# Patient Record
Sex: FEMALE | Race: WHITE | HISPANIC OR LATINO | Employment: FULL TIME | URBAN - METROPOLITAN AREA
[De-identification: names, ages, dates, MRNs, and addresses within clinical notes are randomized per-mention and may not be internally consistent; named-entity substitution may affect disease eponyms.]

---

## 2017-04-10 ENCOUNTER — OFFICE VISIT (OUTPATIENT)
Dept: URGENT CARE | Facility: URGENT CARE | Age: 47
End: 2017-04-10
Payer: COMMERCIAL

## 2017-04-10 VITALS
WEIGHT: 258.8 LBS | BODY MASS INDEX: 47.72 KG/M2 | OXYGEN SATURATION: 97 % | TEMPERATURE: 97.4 F | HEART RATE: 74 BPM | DIASTOLIC BLOOD PRESSURE: 78 MMHG | SYSTOLIC BLOOD PRESSURE: 108 MMHG

## 2017-04-10 DIAGNOSIS — J02.9 ACUTE PHARYNGITIS, UNSPECIFIED: ICD-10-CM

## 2017-04-10 DIAGNOSIS — J02.0 STREP THROAT: Primary | ICD-10-CM

## 2017-04-10 LAB
DEPRECATED S PYO AG THROAT QL EIA: ABNORMAL
MICRO REPORT STATUS: ABNORMAL
SPECIMEN SOURCE: ABNORMAL

## 2017-04-10 PROCEDURE — 99213 OFFICE O/P EST LOW 20 MIN: CPT | Performed by: PHYSICIAN ASSISTANT

## 2017-04-10 PROCEDURE — 87880 STREP A ASSAY W/OPTIC: CPT | Performed by: PHYSICIAN ASSISTANT

## 2017-04-10 RX ORDER — AMOXICILLIN 875 MG
875 TABLET ORAL 2 TIMES DAILY
Qty: 20 TABLET | Refills: 0 | Status: SHIPPED | OUTPATIENT
Start: 2017-04-10 | End: 2017-04-26

## 2017-04-10 NOTE — PROGRESS NOTES
SUBJECTIVE:  Pam Gaviria is a 46 year old female with a chief complaint of sore throat and ear pain.  Onset of symptoms was 1 week(s) ago.    Course of illness: gradual onset and still present.  Severity moderate  Current and Associated symptoms: none  Treatment measures tried include Fluids, OTC meds and Rest.  Predisposing factors include None.    No past medical history on file.  Current Outpatient Prescriptions   Medication Sig Dispense Refill     etonogestrel-ethinyl estradiol (NUVARING) 0.12-0.015 MG/24HR vaginal ring Place 1 ring every 21 days then remove for 1 week. 3 each 0     fluticasone (FLOVENT HFA) 110 MCG/ACT inhaler Inhale 2 puffs into the lungs 2 times daily Need appointment with Dr. Alonso before next refill can be given. 1 Inhaler 0     cetirizine-psuedoePHEDrine (ZYRTEC-D) 5-120 MG per tablet Take 1 tablet by mouth 2 times daily 180 tablet 0     albuterol (PROAIR HFA, PROVENTIL HFA, VENTOLIN HFA) 108 (90 BASE) MCG/ACT inhaler Inhale 2 puffs into the lungs every 6 hours       azelastine (ASTELIN) 137 MCG/SPRAY nasal spray [place] 1 spray into both nostrils 2 times daily       fluticasone (FLONASE) 50 MCG/ACT nasal spray Spray 2 sprays into both nostrils daily       Tetrahydrozoline-Zn Sulfate (EYE DROPS ALLERGY RELIEF OP)        amoxicillin (AMOXIL) 875 MG tablet Take 1 tablet (875 mg) by mouth 2 times daily (Patient not taking: Reported on 4/10/2017) 20 tablet 0     albuterol (PROAIR HFA, PROVENTIL HFA, VENTOLIN HFA) 108 (90 BASE) MCG/ACT inhaler Inhale 2 puffs into the lungs every 6 hours as needed for shortness of breath / dyspnea or wheezing (Patient not taking: Reported on 4/10/2017) 1 Inhaler 1     Social History   Substance Use Topics     Smoking status: Never Smoker     Smokeless tobacco: Never Used     Alcohol use Yes       ROS:  Review of systems negative except as stated above.    OBJECTIVE:   /78 (BP Location: Right arm, Patient Position: Chair, Cuff Size: Adult Large)  Pulse 74   Temp 97.4  F (36.3  C) (Tympanic)  Wt 258 lb 12.8 oz (117.4 kg)  SpO2 97%  BMI 47.72 kg/m2  GENERAL APPEARANCE: healthy, alert and no distress  EYES: EOMI,  PERRL, conjunctiva clear  HENT: ear canals and TM's normal.  Nose normal.  Pharynx erythematous without exudate noted.  NECK: bilateral anterior cervical adenopathy  RESP: lungs clear to auscultation - no rales, rhonchi or wheezes  CV: regular rates and rhythm, normal S1 S2, no murmur noted  ABDOMEN:  soft, nontender, no HSM or masses and bowel sounds normal  SKIN: no suspicious lesions or rashes    Rapid Strep test is positive    ASSESSMENT:  Strep[  Throat    PLAN:   .Amoxicillin as directed  Symptomatic treat with gargles, lozenges, and OTC analgesic as needed. Follow-up with primary clinic if not improving.  Advisement given that patient will be contagious for the next 24-48 hours after antibiotics initiated

## 2017-04-10 NOTE — NURSING NOTE
"Chief Complaint   Patient presents with     Urgent Care     Pharyngitis     Pt states has had swolllen tonils and ear pain x 1 week.        Initial /78 (BP Location: Right arm, Patient Position: Chair, Cuff Size: Adult Large)  Pulse 74  Temp 97.4  F (36.3  C) (Tympanic)  Wt 258 lb 12.8 oz (117.4 kg)  SpO2 97%  BMI 47.72 kg/m2 Estimated body mass index is 47.72 kg/(m^2) as calculated from the following:    Height as of 11/11/15: 5' 1.75\" (1.568 m).    Weight as of this encounter: 258 lb 12.8 oz (117.4 kg).  Medication Reconciliation: unable or not appropriate to perform   Edy Leary CMA ([facility]) 4/10/2017 12:05 PM    "

## 2017-04-10 NOTE — MR AVS SNAPSHOT
After Visit Summary   4/10/2017    Pam Gaviria    MRN: [id number]           Patient Information     Date Of Birth          1970        Visit Information        Provider Department      4/10/2017 11:25 AM Margaret Danielson PA-C [facility]        Today's Diagnoses     Strep throat    -  1    Acute pharyngitis, unspecified           Follow-ups after your visit        Who to contact     If you have questions or need follow up information about today's clinic visit or your schedule please contact [facility] directly at [phone].  Normal or non-critical lab and imaging results will be communicated to you by [employer], letter or phone within 4 business days after the clinic has received the results. If you do not hear from us within 7 days, please contact the clinic through [employer] or phone. If you have a critical or abnormal lab result, we will notify you by phone as soon as possible.  Submit refill requests through [employer] or call your pharmacy and they will forward the refill request to us. Please allow 3 business days for your refill to be completed.          Additional Information About Your Visit        MyChart Information     [employer] gives you secure access to your electronic health record. If you see a primary care provider, you can also send messages to your care team and make appointments. If you have questions, please call your primary care clinic.  If you do not have a primary care provider, please call [phone] and they will assist you.        Care EveryWhere ID     This is your Care EveryWhere ID. This could be used by other organizations to access your [facility] medical records  [id number]        Your Vitals Were     Pulse Temperature Pulse Oximetry BMI (Body Mass Index)          74 97.4  F (36.3  C) (Tympanic) 97% 47.72 kg/m2         Blood Pressure from Last 3 Encounters:   04/10/17 108/78   12/22/16 104/78   11/11/15 106/72    Weight from  Last 3 Encounters:   04/10/17 258 lb 12.8 oz (117.4 kg)   12/22/16 230 lb (104.3 kg)   11/11/15 245 lb 11.2 oz (111.4 kg)              We Performed the Following     Strep, Rapid Screen          Today's Medication Changes          These changes are accurate as of: 4/10/17  2:07 PM.  If you have any questions, ask your nurse or doctor.               These medicines have changed or have updated prescriptions.        Dose/Directions    * amoxicillin 875 MG tablet   Commonly known as:  AMOXIL   This may have changed:  Another medication with the same name was added. Make sure you understand how and when to take each.   Used for:  Strep throat   Changed by:  Carson Acuña PA-C        Dose:  875 mg   Take 1 tablet (875 mg) by mouth 2 times daily   Quantity:  20 tablet   Refills:  0       * amoxicillin 875 MG tablet   Commonly known as:  AMOXIL   This may have changed:  You were already taking a medication with the same name, and this prescription was added. Make sure you understand how and when to take each.   Used for:  Strep throat   Changed by:  Margaret Danielson PA-C        Dose:  875 mg   Take 1 tablet (875 mg) by mouth 2 times daily   Quantity:  20 tablet   Refills:  0       * Notice:  This list has 2 medication(s) that are the same as other medications prescribed for you. Read the directions carefully, and ask your doctor or other care provider to review them with you.         Where to get your medicines      These medications were sent to [id number] - [place] - [address]  AT [facility]  [address] [place] [address]     Phone:  [phone]     amoxicillin 875 MG tablet                Primary Care Provider Office Phone # Fax #    Mandeep Alonso [phone]       [facility] [address] [place] [address] [address] [place] [id number]        Thank you!     Thank you for choosing [facility]  for your care. Our  goal is always to provide you with excellent care. Hearing back from our patients is one way we can continue to improve our services. Please take a few minutes to complete the written survey that you may receive in the mail after your visit with us. Thank you!             Your Updated Medication List - Protect others around you: Learn how to safely use, store and throw away your medicines at www.disposemymeds.org.          This list is accurate as of: 4/10/17  2:07 PM.  Always use your most recent med list.                   Brand Name Dispense Instructions for use    * albuterol 108 (90 BASE) MCG/ACT Inhaler    PROAIR HFA/PROVENTIL HFA/VENTOLIN HFA     Inhale 2 puffs into the lungs every 6 hours       * albuterol 108 (90 BASE) MCG/ACT Inhaler    PROAIR HFA/PROVENTIL HFA/VENTOLIN HFA    1 Inhaler    Inhale 2 puffs into the lungs every 6 hours as needed for shortness of breath / dyspnea or wheezing       * amoxicillin 875 MG tablet    AMOXIL    20 tablet    Take 1 tablet (875 mg) by mouth 2 times daily       * amoxicillin 875 MG tablet    AMOXIL    20 tablet    Take 1 tablet (875 mg) by mouth 2 times daily       azelastine 0.1 % spray    ASTELIN     Spray 1 spray into both nostrils 2 times daily       cetirizine-psuedoePHEDrine 5-120 MG per 12 hr tablet    zyrTEC-D    180 tablet    Take 1 tablet by mouth 2 times daily       etonogestrel-ethinyl estradiol 0.12-0.015 MG/24HR vaginal ring    NUVARING    3 each    Place 1 ring every 21 days then remove for 1 week.       EYE DROPS ALLERGY RELIEF OP          fluticasone 110 MCG/ACT Inhaler    FLOVENT HFA    1 Inhaler    Inhale 2 puffs into the lungs 2 times daily Need appointment with Dr. Alonso before next refill can be given.       fluticasone 50 MCG/ACT spray    FLONASE     Spray 2 sprays into both nostrils daily       * Notice:  This list has 4 medication(s) that are the same as other medications prescribed for you. Read the directions carefully, and ask your doctor or  other care provider to review them with you.

## 2017-04-26 ENCOUNTER — OFFICE VISIT (OUTPATIENT)
Dept: URGENT CARE | Facility: URGENT CARE | Age: 47
End: 2017-04-26
Payer: COMMERCIAL

## 2017-04-26 VITALS
HEART RATE: 82 BPM | BODY MASS INDEX: 47.76 KG/M2 | TEMPERATURE: 99 F | OXYGEN SATURATION: 100 % | WEIGHT: 259 LBS | DIASTOLIC BLOOD PRESSURE: 76 MMHG | SYSTOLIC BLOOD PRESSURE: 110 MMHG

## 2017-04-26 DIAGNOSIS — R05.9 COUGH: ICD-10-CM

## 2017-04-26 DIAGNOSIS — J02.9 SORE THROAT: Primary | ICD-10-CM

## 2017-04-26 DIAGNOSIS — J31.0 CHRONIC RHINITIS: ICD-10-CM

## 2017-04-26 LAB
DEPRECATED S PYO AG THROAT QL EIA: NORMAL
MICRO REPORT STATUS: NORMAL
SPECIMEN SOURCE: NORMAL

## 2017-04-26 PROCEDURE — 87880 STREP A ASSAY W/OPTIC: CPT | Performed by: FAMILY MEDICINE

## 2017-04-26 PROCEDURE — 99214 OFFICE O/P EST MOD 30 MIN: CPT | Performed by: FAMILY MEDICINE

## 2017-04-26 PROCEDURE — 87081 CULTURE SCREEN ONLY: CPT | Performed by: FAMILY MEDICINE

## 2017-04-26 RX ORDER — CODEINE PHOSPHATE AND GUAIFENESIN 10; 100 MG/5ML; MG/5ML
2 SOLUTION ORAL EVERY 4 HOURS PRN
Qty: 120 ML | Refills: 0 | Status: SHIPPED | OUTPATIENT
Start: 2017-04-26 | End: 2017-05-12

## 2017-04-26 RX ORDER — MONTELUKAST SODIUM 10 MG/1
10 TABLET ORAL AT BEDTIME
Qty: 30 TABLET | Refills: 0 | Status: SHIPPED | OUTPATIENT
Start: 2017-04-26 | End: 2017-05-11

## 2017-04-26 NOTE — PROGRESS NOTES
SUBJECTIVE:   Pam Gaviria is a 46 year old female presenting with a chief complaint of sore throat.  Onset of symptoms was 2 day(s) ago.  Course of illness is worsening, persistent.    Severity moderate  Current and Associated symptoms: congestion, sinus headache, itchy ears, coughing  Treatment measures tried include Fluids, OTC meds and Rest.  Predisposing factors include HX of chronic sinusitis, HX of asthma and seasonal allergies.    Patient states that has struggled with chronic allergies all her life, is taking zyrtec and uses her inhalers.  Patient developed more sore throat.  Denies any fever.  Will be leaving for [place] this weekend.    No past medical history on file.  Current Outpatient Prescriptions   Medication Sig Dispense Refill     fluticasone furoate (ARNUITY ELLIPTA) 100 MCG/ACT AEPB inhalation powder Inhale 1 puff into the lungs daily       etonogestrel-ethinyl estradiol (NUVARING) 0.12-0.015 MG/24HR vaginal ring Place 1 ring every 21 days then remove for 1 week. 3 each 0     fluticasone (FLOVENT HFA) 110 MCG/ACT inhaler Inhale 2 puffs into the lungs 2 times daily Need appointment with Dr. Alonso before next refill can be given. 1 Inhaler 0     albuterol (PROAIR HFA, PROVENTIL HFA, VENTOLIN HFA) 108 (90 BASE) MCG/ACT inhaler Inhale 2 puffs into the lungs every 6 hours as needed for shortness of breath / dyspnea or wheezing 1 Inhaler 1     cetirizine-psuedoePHEDrine (ZYRTEC-D) 5-120 MG per tablet Take 1 tablet by mouth 2 times daily 180 tablet 0     azelastine (ASTELIN) 137 MCG/SPRAY nasal spray [place] 1 spray into both nostrils 2 times daily       fluticasone (FLONASE) 50 MCG/ACT nasal spray Spray 2 sprays into both nostrils daily       Tetrahydrozoline-Zn Sulfate (EYE DROPS ALLERGY RELIEF OP)        [DISCONTINUED] albuterol (PROAIR HFA, PROVENTIL HFA, VENTOLIN HFA) 108 (90 BASE) MCG/ACT inhaler Inhale 2 puffs into the lungs every 6 hours       Social History   Substance Use Topics      Smoking status: Never Smoker     Smokeless tobacco: Never Used     Alcohol use Yes       ROS:  CONSTITUTIONAL:NEGATIVE for fever, chills, change in weight and POSITIVE  for fatigue  ENT/MOUTH: POSITIVE for earache bilateral, Hx sinus infections, nasal congestion, sinus pressure, sneezing and sore throat  RESP:POSITIVE for cough-non productive, cough-productive and Hx asthma  CV: NEGATIVE for chest pain, palpitations or peripheral edema  GI: NEGATIVE for nausea, abdominal pain, heartburn, or change in bowel habits  MUSCULOSKELETAL: NEGATIVE for significant arthralgias or myalgia    OBJECTIVE  :/76  Pulse 82  Temp 99  F (37.2  C) (Oral)  Wt 259 lb (117.5 kg)  SpO2 100%  BMI 47.76 kg/m2  GENERAL APPEARANCE: healthy, alert and no distress  EYES: EOMI,  PERRL, conjunctiva clear  HENT: ear canals and TM's normal.  Nose and mouth without ulcers, erythema or lesions  NECK: supple, nontender, no lymphadenopathy  RESP: lungs clear to auscultation - no rales, rhonchi or wheezes  CV: regular rates and rhythm, normal S1 S2, no murmur noted  NEURO: Normal strength and tone, sensory exam grossly normal,  normal speech and mentation  SKIN: no suspicious lesions or rashes  PSYCH: mentation appears normal and affect normal/bright    Results for orders placed or performed in visit on 04/26/17   Strep, Rapid Screen   Result Value Ref Range    Specimen Description Throat     Rapid Strep A Screen       NEGATIVE: No Group A streptococcal antigen detected by immunoassay, await   culture report.      Micro Report Status FINAL 04/26/2017          ASSESSMENT/PLAN:  (J02.9) Sore throat  (primary encounter diagnosis)  Comment: viral  Plan: Strep, Rapid Screen, Beta strep group A culture            (R05) Cough  Comment: viral  Plan: fluticasone furoate (ARNUITY ELLIPTA) 100         MCG/ACT AEPB inhalation powder,         guaiFENesin-codeine (ROBITUSSIN AC) 100-10         MG/5ML SOLN solution            (J31.0) Chronic  rhinitis  Comment: allergies  Plan: fluticasone furoate (ARNUITY ELLIPTA) 100         MCG/ACT AEPB inhalation powder, montelukast         (SINGULAIR) 10 MG tablet            Reassurance given, reviewed symptomatic treatment, plenty of fluids and rest.  Will follow up on throat culture and treat if positive for strep infection.  Encourage to continue with inhalers.  RX adam AC given to help with cough, best at bedtime.  Trial of singulair given to use due to asthma and allergies.    Follow up with primary provider if no resolution of symptoms.    Pawan Gutierrez MD  April 26, 2017 5:27 PM

## 2017-04-26 NOTE — MR AVS SNAPSHOT
After Visit Summary   4/26/2017    Pam Gaviria    MRN: [id number]           Patient Information     Date Of Birth          1970        Visit Information        Provider Department      4/26/2017 4:30 PM Pawan Gutierrez MD [facility]        Today's Diagnoses     Sore throat    -  1    Cough        Chronic rhinitis          Care Instructions    Okay to take benadryl 50 mg every 6 hours as needed.  Okay to try Singulair to help with allergies and asthma.          Viral Upper Respiratory Illness (Adult)  You have a viral upper respiratory illness (URI), which is another term for the common cold. This illness is contagious during the first few days. It is spread through the air by coughing and sneezing. It may also be spread by direct contact (touching the sick person and then touching your own eyes, nose, or mouth). Frequent handwashing will decrease risk of spread. Most viral illnesses go away within 7 to 10 days with rest and simple home remedies. Sometimes the illness may last for several weeks. Antibiotics will not kill a virus, and they are generally not prescribed for this condition.    Home care    If symptoms are severe, rest at home for the first 2 to 3 days. When you resume activity, don't let yourself get too tired.    Avoid being exposed to cigarette smoke (yours or others ).    You may use acetaminophen or ibuprofen to control pain and fever, unless another medicine was prescribed. (Note: If you have chronic liver or kidney disease, have ever had a stomach ulcer or gastrointestinal bleeding, or are taking blood-thinning medicines, talk with your healthcare provider before using these medicines.) Aspirin should never be given to anyone under 18 years of age who is ill with a viral infection or fever. It may cause severe liver or brain damage.    Your appetite may be poor, so a light diet is fine. Avoid dehydration by drinking 6 to 8 glasses of fluids per day (water, soft  drinks, juices, tea, or soup). Extra fluids will help loosen secretions in the nose and lungs.    Over-the-counter cold medicines will not shorten the length of time you re sick, but they may be helpful for the following symptoms: cough, sore throat, and nasal and sinus congestion. (Note: Do not use decongestants if you have high blood pressure.)  Follow-up care  Follow up with your healthcare provider, or as advised.  When to seek medical advice  Call your healthcare provider right away if any of these occur:    Cough with lots of colored sputum (mucus)    Severe headache; face, neck, or ear pain    Difficulty swallowing due to throat pain    Fever of 100.4 F (38 C)  Call 911, or get immediate medical care  Call emergency services right away if any of these occur:    Chest pain, shortness of breath, wheezing, or difficulty breathing    Coughing up blood    Inability to swallow due to throat pain    6068-4395 The [employer]. [address] [place] [address]. All rights reserved. This information is not intended as a substitute for professional medical care. Always follow your healthcare professional's instructions.              Follow-ups after your visit        Who to contact     If you have questions or need follow up information about today's clinic visit or your schedule please contact [facility] directly at [phone].  Normal or non-critical lab and imaging results will be communicated to you by MyChart, letter or phone within 4 business days after the clinic has received the results. If you do not hear from us within 7 days, please contact the clinic through MyChart or phone. If you have a critical or abnormal lab result, we will notify you by phone as soon as possible.  Submit refill requests through [employer] or call your pharmacy and they will forward the refill request to us. Please allow 3 business days for your refill to be completed.          Additional Information  About Your Visit        [employer] Information     [employer] gives you secure access to your electronic health record. If you see a primary care provider, you can also send messages to your care team and make appointments. If you have questions, please call your primary care clinic.  If you do not have a primary care provider, please call [phone] and they will assist you.        Care EveryWhere ID     This is your Care EveryWhere ID. This could be used by other organizations to access your [place] medical records  [id number]        Your Vitals Were     Pulse Temperature Pulse Oximetry BMI (Body Mass Index)          82 99  F (37.2  C) (Oral) 100% 47.76 kg/m2         Blood Pressure from Last 3 Encounters:   04/26/17 110/76   04/10/17 108/78   12/22/16 104/78    Weight from Last 3 Encounters:   04/26/17 259 lb (117.5 kg)   04/10/17 258 lb 12.8 oz (117.4 kg)   12/22/16 230 lb (104.3 kg)              We Performed the Following     Beta strep group A culture     Strep, Rapid Screen          Today's Medication Changes          These changes are accurate as of: 4/26/17  5:19 PM.  If you have any questions, ask your nurse or doctor.               Start taking these medicines.        Dose/Directions    guaiFENesin-codeine 100-10 MG/5ML Soln solution   Commonly known as:  ROBITUSSIN AC   Used for:  Cough   Started by:  Pawan Gutierrez MD        Dose:  2 tsp.   Take 10 mLs by mouth every 4 hours as needed   Quantity:  120 mL   Refills:  0       montelukast 10 MG tablet   Commonly known as:  SINGULAIR   Used for:  Chronic rhinitis   Started by:  Pawan Gutierrez MD        Dose:  10 mg   Take 1 tablet (10 mg) by mouth At Bedtime   Quantity:  30 tablet   Refills:  0            Where to get your medicines      These medications were sent to [id number] - [place] - [address]  AT [facility]  [address] [place] [address]     Phone:  [phone]     montelukast 10 MG tablet          Some of these will need a paper prescription and others can be bought over the counter.  Ask your nurse if you have questions.     Bring a paper prescription for each of these medications     guaiFENesin-codeine 100-10 MG/5ML Soln solution                Primary Care Provider Office Phone # Fax #    Mandeep Alonso [phone]       [address] [place] [address]        Thank you!     Thank you for choosing [facility]  for your care. Our goal is always to provide you with excellent care. Hearing back from our patients is one way we can continue to improve our services. Please take a few minutes to complete the written survey that you may receive in the mail after your visit with us. Thank you!             Your Updated Medication List - Protect others around you: Learn how to safely use, store and throw away your medicines at www.disposemymeds.org.          This list is accurate as of: 4/26/17  5:19 PM.  Always use your most recent med list.                   Brand Name Dispense Instructions for use    albuterol 108 (90 BASE) MCG/ACT Inhaler    PROAIR HFA/PROVENTIL HFA/VENTOLIN HFA    1 Inhaler    Inhale 2 puffs into the lungs every 6 hours as needed for shortness of breath / dyspnea or wheezing       azelastine 0.1 % spray    ASTELIN     Spray 1 spray into both nostrils 2 times daily       cetirizine-psuedoePHEDrine 5-120 MG per 12 hr tablet    zyrTEC-D    180 tablet    Take 1 tablet by mouth 2 times daily       etonogestrel-ethinyl estradiol 0.12-0.015 MG/24HR vaginal ring    NUVARING    3 each    Place 1 ring every 21 days then remove for 1 week.       EYE DROPS ALLERGY RELIEF OP          fluticasone 110 MCG/ACT Inhaler    FLOVENT HFA    1 Inhaler    Inhale 2 puffs into the lungs 2 times daily Need appointment with Dr. Alonso before next refill can be given.       fluticasone 50 MCG/ACT spray    FLONASE     Spray 2 sprays into both nostrils daily        fluticasone furoate 100 MCG/ACT Aepb inhalation powder    ARNUITY ELLIPTA     Inhale 1 puff into the lungs daily       guaiFENesin-codeine 100-10 MG/5ML Soln solution    ROBITUSSIN AC    120 mL    Take 10 mLs by mouth every 4 hours as needed       montelukast 10 MG tablet    SINGULAIR    30 tablet    Take 1 tablet (10 mg) by mouth At Bedtime

## 2017-04-26 NOTE — PATIENT INSTRUCTIONS
Okay to take benadryl 50 mg every 6 hours as needed.  Okay to try Singulair to help with allergies and asthma.          Viral Upper Respiratory Illness (Adult)  You have a viral upper respiratory illness (URI), which is another term for the common cold. This illness is contagious during the first few days. It is spread through the air by coughing and sneezing. It may also be spread by direct contact (touching the sick person and then touching your own eyes, nose, or mouth). Frequent handwashing will decrease risk of spread. Most viral illnesses go away within 7 to 10 days with rest and simple home remedies. Sometimes the illness may last for several weeks. Antibiotics will not kill a virus, and they are generally not prescribed for this condition.    Home care    If symptoms are severe, rest at home for the first 2 to 3 days. When you resume activity, don't let yourself get too tired.    Avoid being exposed to cigarette smoke (yours or others ).    You may use acetaminophen or ibuprofen to control pain and fever, unless another medicine was prescribed. (Note: If you have chronic liver or kidney disease, have ever had a stomach ulcer or gastrointestinal bleeding, or are taking blood-thinning medicines, talk with your healthcare provider before using these medicines.) Aspirin should never be given to anyone under 18 years of age who is ill with a viral infection or fever. It may cause severe liver or brain damage.    Your appetite may be poor, so a light diet is fine. Avoid dehydration by drinking 6 to 8 glasses of fluids per day (water, soft drinks, juices, tea, or soup). Extra fluids will help loosen secretions in the nose and lungs.    Over-the-counter cold medicines will not shorten the length of time you re sick, but they may be helpful for the following symptoms: cough, sore throat, and nasal and sinus congestion. (Note: Do not use decongestants if you have high blood pressure.)  Follow-up care  Follow up with  your healthcare provider, or as advised.  When to seek medical advice  Call your healthcare provider right away if any of these occur:    Cough with lots of colored sputum (mucus)    Severe headache; face, neck, or ear pain    Difficulty swallowing due to throat pain    Fever of 100.4 F (38 C)  Call 911, or get immediate medical care  Call emergency services right away if any of these occur:    Chest pain, shortness of breath, wheezing, or difficulty breathing    Coughing up blood    Inability to swallow due to throat pain    0930-8001 [employer]. [address] [place] [address]. All rights reserved. This information is not intended as a substitute for professional medical care. Always follow your healthcare professional's instructions.

## 2017-04-26 NOTE — NURSING NOTE
"Chief Complaint   Patient presents with     Urgent Care     Sick     Itchy, sore throat X 2 days.  Wet, productive cough started today as well as left ear pain.  Had strep about 2-3 weeks ago.     Initial /76  Pulse 82  Temp 99  F (37.2  C) (Oral)  Wt 259 lb (117.5 kg)  SpO2 100%  BMI 47.76 kg/m2 Estimated body mass index is 47.76 kg/(m^2) as calculated from the following:    Height as of 11/11/15: 5' 1.75\" (1.568 m).    Weight as of this encounter: 259 lb (117.5 kg).  BP completed using cuff size  large    Rashida Rendon/GISELA    "

## 2017-04-27 LAB
BACTERIA SPEC CULT: NORMAL
MICRO REPORT STATUS: NORMAL
SPECIMEN SOURCE: NORMAL

## 2017-05-12 ENCOUNTER — TELEPHONE (OUTPATIENT)
Dept: PEDIATRICS | Facility: CLINIC | Age: 47
End: 2017-05-12

## 2017-05-12 ENCOUNTER — OFFICE VISIT (OUTPATIENT)
Dept: PEDIATRICS | Facility: CLINIC | Age: 47
End: 2017-05-12
Payer: COMMERCIAL

## 2017-05-12 VITALS
DIASTOLIC BLOOD PRESSURE: 66 MMHG | TEMPERATURE: 97.6 F | HEIGHT: 62 IN | OXYGEN SATURATION: 97 % | HEART RATE: 104 BPM | BODY MASS INDEX: 46.94 KG/M2 | SYSTOLIC BLOOD PRESSURE: 112 MMHG | WEIGHT: 255.1 LBS

## 2017-05-12 DIAGNOSIS — J31.0 CHRONIC RHINITIS: ICD-10-CM

## 2017-05-12 DIAGNOSIS — J45.41 MODERATE PERSISTENT ASTHMA WITH ACUTE EXACERBATION: ICD-10-CM

## 2017-05-12 DIAGNOSIS — E66.01 MORBID OBESITY, UNSPECIFIED OBESITY TYPE (H): ICD-10-CM

## 2017-05-12 DIAGNOSIS — Z30.40 ENCOUNTER FOR SURVEILLANCE OF CONTRACEPTIVES: ICD-10-CM

## 2017-05-12 DIAGNOSIS — Z00.00 ROUTINE HEALTH MAINTENANCE: Primary | ICD-10-CM

## 2017-05-12 PROCEDURE — 99396 PREV VISIT EST AGE 40-64: CPT | Performed by: NURSE PRACTITIONER

## 2017-05-12 PROCEDURE — 87624 HPV HI-RISK TYP POOLED RSLT: CPT | Performed by: NURSE PRACTITIONER

## 2017-05-12 PROCEDURE — G0145 SCR C/V CYTO,THINLAYER,RESCR: HCPCS | Performed by: NURSE PRACTITIONER

## 2017-05-12 PROCEDURE — 99213 OFFICE O/P EST LOW 20 MIN: CPT | Mod: 25 | Performed by: NURSE PRACTITIONER

## 2017-05-12 PROCEDURE — G0124 SCREEN C/V THIN LAYER BY MD: HCPCS | Performed by: NURSE PRACTITIONER

## 2017-05-12 RX ORDER — MONTELUKAST SODIUM 10 MG/1
10 TABLET ORAL AT BEDTIME
Qty: 90 TABLET | Refills: 3 | Status: SHIPPED | OUTPATIENT
Start: 2017-05-12 | End: 2018-05-07

## 2017-05-12 RX ORDER — AZELASTINE 1 MG/ML
1-2 SPRAY, METERED NASAL 2 TIMES DAILY
Qty: 1 BOTTLE | Refills: 1 | Status: SHIPPED | OUTPATIENT
Start: 2017-05-12 | End: 2017-11-03

## 2017-05-12 RX ORDER — FLUTICASONE PROPIONATE 50 MCG
1-2 SPRAY, SUSPENSION (ML) NASAL DAILY
Qty: 1 BOTTLE | Refills: 11 | Status: SHIPPED | OUTPATIENT
Start: 2017-05-12 | End: 2017-06-13

## 2017-05-12 RX ORDER — CETIRIZINE HYDROCHLORIDE, PSEUDOEPHEDRINE HYDROCHLORIDE 5; 120 MG/1; MG/1
1 TABLET, FILM COATED, EXTENDED RELEASE ORAL 2 TIMES DAILY
Qty: 180 TABLET | Refills: 0 | Status: CANCELLED | OUTPATIENT
Start: 2017-05-12

## 2017-05-12 RX ORDER — FLUTICASONE PROPIONATE 110 UG/1
2 AEROSOL, METERED RESPIRATORY (INHALATION) 2 TIMES DAILY
Qty: 1 INHALER | Refills: 0 | Status: CANCELLED | OUTPATIENT
Start: 2017-05-12

## 2017-05-12 NOTE — PATIENT INSTRUCTIONS
-Come back for mammogram & labs  -Please see OB for IUD placement. I placed referral    Preventive Health Recommendations  Female Ages 40 to 49    Yearly exam:     See your health care provider every year in order to  1. Review health changes.   2. Discuss preventive care.    3. Review your medicines if your doctor prescribed any.      Get a Pap test every three years (unless you have an abnormal result and your provider advises testing more often).      If you get Pap tests with HPV test, you only need to test every 5 years, unless you have an abnormal result. You do not need a Pap test if your uterus was removed (hysterectomy) and you have not had cancer.      You should be tested each year for STDs (sexually transmitted diseases), if you're at risk.       Ask your doctor if you should have a mammogram.      Have a colonoscopy (test for colon cancer) if someone in your family has had colon cancer or polyps before age 50.       Have a cholesterol test every 5 years.       Have a diabetes test (fasting glucose) after age 45. If you are at risk for diabetes, you should have this test every 3 years.    Shots: Get a flu shot each year. Get a tetanus shot every 10 years.     Nutrition:     Eat at least 5 servings of fruits and vegetables each day.    Eat whole-grain bread, whole-wheat pasta and brown rice instead of white grains and rice.    Talk to your provider about Calcium and Vitamin D.     Lifestyle    Exercise at least 150 minutes a week (an average of 30 minutes a day, 5 days a week). This will help you control your weight and prevent disease.    Limit alcohol to one drink per day.    No smoking.     Wear sunscreen to prevent skin cancer.    See your dentist every six months for an exam and cleaning.

## 2017-05-12 NOTE — MR AVS SNAPSHOT
After Visit Summary   5/12/2017    Pam Gaviria    MRN: [id number]           Patient Information     Date Of Birth          1970        Visit Information        Provider Department      5/12/2017 9:40 AM Jackelin Parker APRN [facility]        Today's Diagnoses     Routine health maintenance    -  1    Chronic rhinitis        Moderate persistent asthma with acute exacerbation        Encounter for surveillance of contraceptives          Care Instructions    -Come back for mammogram & labs  -Please see OB for IUD placement. I placed referral    Preventive Health Recommendations  Female Ages 40 to 49    Yearly exam:     See your health care provider every year in order to  1. Review health changes.   2. Discuss preventive care.    3. Review your medicines if your doctor prescribed any.      Get a Pap test every three years (unless you have an abnormal result and your provider advises testing more often).      If you get Pap tests with HPV test, you only need to test every 5 years, unless you have an abnormal result. You do not need a Pap test if your uterus was removed (hysterectomy) and you have not had cancer.      You should be tested each year for STDs (sexually transmitted diseases), if you're at risk.       Ask your doctor if you should have a mammogram.      Have a colonoscopy (test for colon cancer) if someone in your family has had colon cancer or polyps before age 50.       Have a cholesterol test every 5 years.       Have a diabetes test (fasting glucose) after age 45. If you are at risk for diabetes, you should have this test every 3 years.    Shots: Get a flu shot each year. Get a tetanus shot every 10 years.     Nutrition:     Eat at least 5 servings of fruits and vegetables each day.    Eat whole-grain bread, whole-wheat pasta and brown rice instead of white grains and rice.    Talk to your provider about Calcium and Vitamin D.     Lifestyle    Exercise at  least 150 minutes a week (an average of 30 minutes a day, 5 days a week). This will help you control your weight and prevent disease.    Limit alcohol to one drink per day.    No smoking.     Wear sunscreen to prevent skin cancer.    See your dentist every six months for an exam and cleaning.        Follow-ups after your visit        Additional Services     OB/GYN REFERRAL       Your provider has referred you to:  [facility]: [facility] [place] [phone]   [email or web address]    Please be aware that coverage of these services is subject to the terms and limitations of your health insurance plan.  Call member services at your health plan with any benefit or coverage questions.      Please bring the following with you to your appointment:    (1) Any X-Rays, CTs or MRIs which have been performed.  Contact the facility where they were done to arrange for  prior to your scheduled appointment.   (2) List of current medications   (3) This referral request   (4) Any documents/labs given to you for this referral                  Future tests that were ordered for you today     Open Future Orders        Priority Expected Expires Ordered    Glucose Routine  5/12/2018 5/12/2017    Lipid panel reflex to direct LDL Routine  5/12/2018 5/12/2017    *MA Screening Digital Bilateral Routine  5/12/2018 5/12/2017            Who to contact     If you have questions or need follow up information about today's clinic visit or your schedule please contact [facility] directly at [phone].  Normal or non-critical lab and imaging results will be communicated to you by MyChart, letter or phone within 4 business days after the clinic has received the results. If you do not hear from us within 7 days, please contact the clinic through MyChart or phone. If you have a critical or abnormal lab result, we will notify you by phone as soon as possible.  Submit refill requests through [employer] or call  "your pharmacy and they will forward the refill request to us. Please allow 3 business days for your refill to be completed.          Additional Information About Your Visit        [employer] Information     [employer] gives you secure access to your electronic health record. If you see a primary care provider, you can also send messages to your care team and make appointments. If you have questions, please call your primary care clinic.  If you do not have a primary care provider, please call [phone] and they will assist you.        Care EveryWhere ID     This is your Care EveryWhere ID. This could be used by other organizations to access your [place] medical records  [id number]        Your Vitals Were     Pulse Temperature Height Last Period Pulse Oximetry BMI (Body Mass Index)    104 97.6  F (36.4  C) (Tympanic) 5' 1.75\" (1.568 m) 05/09/2017 (Exact Date) 97% 47.04 kg/m2       Blood Pressure from Last 3 Encounters:   05/12/17 112/66   04/26/17 110/76   04/10/17 108/78    Weight from Last 3 Encounters:   05/12/17 255 lb 1.6 oz (115.7 kg)   04/26/17 259 lb (117.5 kg)   04/10/17 258 lb 12.8 oz (117.4 kg)              We Performed the Following     Asthma Action Plan (AAP)     HPV High Risk Types DNA Cervical     OB/GYN REFERRAL     Pap imaged thin layer screen with HPV - recommended age 30 - 65 years (select HPV order below)          Today's Medication Changes          These changes are accurate as of: 5/12/17 10:05 AM.  If you have any questions, ask your nurse or doctor.               Start taking these medicines.        Dose/Directions    fluticasone-salmeterol 100-50 MCG/DOSE diskus inhaler   Commonly known as:  ADVAIR   Used for:  Moderate persistent asthma with acute exacerbation   Started by:  Jackelin Parker APRN CNP        Dose:  1 puff   Inhale 1 puff into the lungs 2 times daily   Quantity:  1 Inhaler   Refills:  1         These medicines have changed or have updated prescriptions.        " Dose/Directions    albuterol 108 (90 BASE) MCG/ACT Inhaler   Commonly known as:  PROAIR HFA/PROVENTIL HFA/VENTOLIN HFA   This may have changed:  additional instructions   Used for:  Moderate persistent asthma with acute exacerbation        Dose:  2 puff   Inhale 2 puffs into the lungs every 6 hours as needed for shortness of breath / dyspnea or wheezing   Quantity:  1 Inhaler   Refills:  1       * azelastine 0.1 % spray   Commonly known as:  ASTELIN   This may have changed:  Another medication with the same name was added. Make sure you understand how and when to take each.   Changed by:  Mandeep Alonso MD        Dose:  1 spray   Spray 1 spray into both nostrils 2 times daily   Refills:  0       * azelastine 0.1 % spray   Commonly known as:  ASTELIN   This may have changed:  You were already taking a medication with the same name, and this prescription was added. Make sure you understand how and when to take each.   Used for:  Moderate persistent asthma with acute exacerbation   Changed by:  Jackelin Parker APRN CNP        Dose:  1-2 spray   Spray 1-2 sprays into both nostrils 2 times daily   Quantity:  1 Bottle   Refills:  1       * fluticasone 50 MCG/ACT spray   Commonly known as:  FLONASE   This may have changed:  Another medication with the same name was added. Make sure you understand how and when to take each.   Changed by:  Mandeep Alonso MD        Dose:  2 spray   Spray 2 sprays into both nostrils daily   Refills:  0       * fluticasone 50 MCG/ACT spray   Commonly known as:  FLONASE   This may have changed:  You were already taking a medication with the same name, and this prescription was added. Make sure you understand how and when to take each.   Used for:  Moderate persistent asthma with acute exacerbation   Changed by:  Jackelin Parker APRN CNP        Dose:  1-2 spray   Spray 1-2 sprays into both nostrils daily   Quantity:  1 Bottle   Refills:  11       * Notice:  This list has 4  medication(s) that are the same as other medications prescribed for you. Read the directions carefully, and ask your doctor or other care provider to review them with you.      Stop taking these medicines if you haven't already. Please contact your care team if you have questions.     cetirizine-psuedoePHEDrine 5-120 MG per 12 hr tablet   Commonly known as:  zyrTEC-D   Stopped by:  Jackelin Parker APRN CNP           fluticasone 110 MCG/ACT Inhaler   Commonly known as:  FLOVENT HFA   Stopped by:  Jackelin Parker APRN CNP           fluticasone furoate 100 MCG/ACT Aepb inhalation powder   Commonly known as:  ARNUITY ELLIPTA   Stopped by:  Jackelin Parker APRN CNP                Where to get your medicines      These medications were sent to [facility] [place] [employer] [address] - [place] - [address]  AT [facility]  [address] [place] [address]     Phone:  [phone]     azelastine 0.1 % spray    fluticasone 50 MCG/ACT spray    fluticasone-salmeterol 100-50 MCG/DOSE diskus inhaler    montelukast 10 MG tablet                Primary Care Provider Office Phone # Fax #    Mandeep Alonso [phone]       [address] [place] [address]        Thank you!     Thank you for choosing [facility]  for your care. Our goal is always to provide you with excellent care. Hearing back from our patients is one way we can continue to improve our services. Please take a few minutes to complete the written survey that you may receive in the mail after your visit with us. Thank you!             Your Updated Medication List - Protect others around you: Learn how to safely use, store and throw away your medicines at www.disposemymeds.org.          This list is accurate as of: 5/12/17 10:05 AM.  Always use your most recent med list.                   Brand Name Dispense Instructions for use    albuterol 108 (90 BASE) MCG/ACT  Inhaler    PROAIR HFA/PROVENTIL HFA/VENTOLIN HFA    1 Inhaler    Inhale 2 puffs into the lungs every 6 hours as needed for shortness of breath / dyspnea or wheezing       * azelastine 0.1 % spray    ASTELIN     Spray 1 spray into both nostrils 2 times daily       * azelastine 0.1 % spray    ASTELIN    1 Bottle    Spray 1-2 sprays into both nostrils 2 times daily       etonogestrel-ethinyl estradiol 0.12-0.015 MG/24HR vaginal ring    NUVARING    3 each    Place 1 ring every 21 days then remove for 1 week.       EYE DROPS ALLERGY RELIEF OP          * fluticasone 50 MCG/ACT spray    FLONASE     Spray 2 sprays into both nostrils daily       * fluticasone 50 MCG/ACT spray    FLONASE    1 Bottle    Spray 1-2 sprays into both nostrils daily       fluticasone-salmeterol 100-50 MCG/DOSE diskus inhaler    ADVAIR    1 Inhaler    Inhale 1 puff into the lungs 2 times daily       montelukast 10 MG tablet    SINGULAIR    90 tablet    Take 1 tablet (10 mg) by mouth At Bedtime       * Notice:  This list has 4 medication(s) that are the same as other medications prescribed for you. Read the directions carefully, and ask your doctor or other care provider to review them with you.

## 2017-05-12 NOTE — LETTER
[facility]  [address]  [place] [address]  [phone]      May 12, 2017    Pam Gaviria                                                                                                                                                       [address]   [place] [address]                Enrrique Orozco,     It was great to meet you today. In thinking more about your busy schedule and goal to lose weight, I really think it would be a good idea to see endocrinology to discuss weight loss medications. They can be super helpful in giving a boost to your already good habits including exercise and eating healthier. Please give them a call to schedule.      [facility]: [facility] [phone] .     All the best,     Jackelin Parker, FNP-DNP

## 2017-05-12 NOTE — NURSING NOTE
"Chief Complaint   Patient presents with     Physical       Initial /66 (Cuff Size: Adult Large)  Pulse 104  Temp 97.6  F (36.4  C) (Tympanic)  Ht 5' 1.75\" (1.568 m)  Wt 255 lb 1.6 oz (115.7 kg)  LMP 05/09/2017 (Exact Date)  SpO2 97%  BMI 47.04 kg/m2 Estimated body mass index is 47.04 kg/(m^2) as calculated from the following:    Height as of this encounter: 5' 1.75\" (1.568 m).    Weight as of this encounter: 255 lb 1.6 oz (115.7 kg).  Medication Reconciliation: complete   Michelle Fitzgerald CMA    "

## 2017-05-12 NOTE — PROGRESS NOTES
SUBJECTIVE:     CC: Pam Gaviria is an 46 year old woman who presents for preventive health visit.     Answers for HPI/ROS submitted by the patient on 5/10/2017   Annual Exam:  Getting at least 3 servings of Calcium per day:: Yes  Bi-annual eye exam:: Yes  Dental care twice a year:: Yes  Sleep apnea or symptoms of sleep apnea:: Daytime drowsiness  Diet:: Regular (no restrictions)  Frequency of exercise:: 2-3 days/week  Taking medications regularly:: Yes  Medication side effects:: None  Additional concerns today:: No  Q1: Little interest or pleasure in doing things: 0=Not at all  Q2: Feeling down, depressed or hopeless: 0=Not at all  PHQ-2 Score: 0  Duration of exercise:: 30-45 minutes    Concerns today: states does not like Ellipta inhaler, doesn't work as well as the Advair.  - need to renew asthma meds today. Asthma is fairly well controlled but not as good as when on Advair. UC started her on Singulair, which has helped both allergies and asthma.     Today's PHQ-2 Score:   PHQ-2 ( 1999 Pfizer) 5/12/2017 5/10/2017   Q1: Little interest or pleasure in doing things 0 -   Q2: Feeling down, depressed or hopeless 0 -   PHQ-2 Score 0 -   Little interest or pleasure in doing things - Not at all   Feeling down, depressed or hopeless - Not at all   PHQ-2 Score - 0     Abuse: Current or Past(Physical, Sexual or Emotional)- No  Do you feel safe in your environment - Yes    Social History   Substance Use Topics     Smoking status: Never Smoker     Smokeless tobacco: Never Used     Alcohol use Yes      Comment: 1 time evry 3 months, 1 per time     The patient does not drink >3 drinks per day nor >7 drinks per week.    No results for input(s): CHOL, HDL, LDL, TRIG, CHOLHDLRATIO, NHDL in the last 75035 hours.    Reviewed orders with patient.  Reviewed health maintenance and updated orders accordingly - Yes    Mammo Decision Support:  Patient under age 50, mutual decision reflected in health maintenance.      Pertinent  "mammograms are reviewed under the imaging tab.  History of abnormal Pap smear: NO - age 30-65 PAP every 5 years with negative HPV co-testing recommended    Reviewed and updated as needed this visit by clinical staff  Tobacco  Allergies  Meds  Med Hx  Surg Hx  Fam Hx  Soc Hx        Reviewed and updated as needed this visit by Provider            ROS:  C: NEGATIVE for fever, chills, change in weight  I: NEGATIVE for worrisome rashes, moles or lesions  E: NEGATIVE for vision changes or irritation  ENT: NEGATIVE for ear, mouth and throat problems  R: NEGATIVE for significant cough or SOB  B: NEGATIVE for masses, tenderness or discharge  CV: NEGATIVE for chest pain, palpitations or peripheral edema  GI: NEGATIVE for nausea, abdominal pain, heartburn, or change in bowel habits  : NEGATIVE for unusual urinary or vaginal symptoms. Periods are regular.  M: NEGATIVE for significant arthralgias or myalgia  N: NEGATIVE for weakness, dizziness or paresthesias  P: NEGATIVE for changes in mood or affect    Problem list, Medication list, Allergies, and Medical/Social/Surgical histories reviewed in [facility] and updated as appropriate.  OBJECTIVE:     /66 (Cuff Size: Adult Large)  Pulse 104  Temp 97.6  F (36.4  C) (Tympanic)  Ht 5' 1.75\" (1.568 m)  Wt 255 lb 1.6 oz (115.7 kg)  LMP 05/09/2017 (Exact Date)  SpO2 97%  BMI 47.04 kg/m2  EXAM:  GENERAL: healthy, alert and no distress  EYES: Eyes grossly normal to inspection, PERRL and conjunctivae and sclerae normal  HENT: ear canals and TM's normal, nose and mouth without ulcers or lesions  NECK: no adenopathy, no asymmetry, masses, or scars and thyroid normal to palpation  RESP: lungs clear to auscultation - no rales, rhonchi or wheezes  BREAST: normal without masses, tenderness or nipple discharge and no palpable axillary masses or adenopathy  CV: regular rate and rhythm, normal S1 S2, no S3 or S4, no murmur, click or rub, no peripheral edema and peripheral pulses " strong  ABDOMEN: soft, nontender, no hepatosplenomegaly, no masses and bowel sounds normal   (female): normal female external genitalia, normal urethral meatus, vaginal mucosa pink, moist, well rugated, and normal cervix/adnexa/uterus without masses or discharge  MS: no gross musculoskeletal defects noted, no edema  SKIN: no suspicious lesions or rashes  NEURO: Normal strength and tone, mentation intact and speech normal  PSYCH: mentation appears normal, affect normal/bright    ASSESSMENT/PLAN:     1. Routine health maintenance  - HPV High Risk Types DNA Cervical  - Pap imaged thin layer screen with HPV - recommended age 30 - 65 years (select HPV order below)  - Glucose; Future  - Lipid panel reflex to direct LDL; Future  - *MA Screening Digital Bilateral; Future    2. Chronic rhinitis  Well controlled on Singulair, flonase, azelastine, and zyrtec intermittently.   - montelukast (SINGULAIR) 10 MG tablet; Take 1 tablet (10 mg) by mouth At Bedtime  Dispense: 90 tablet; Refill: 3    3. Moderate persistent asthma with acute exacerbation  Well controlled but not perfectly controlled. Was switched by allergist to Arnuity Ellipta. [place] Advair worked better. Will switch back to Advair and have her f/u in 1 month.   - fluticasone-salmeterol (ADVAIR) 100-50 MCG/DOSE diskus inhaler; Inhale 1 puff into the lungs 2 times daily  Dispense: 1 Inhaler; Refill: 1  - fluticasone (FLONASE) 50 MCG/ACT spray; Spray 1-2 sprays into both nostrils daily  Dispense: 1 Bottle; Refill: 11  - azelastine (ASTELIN) 0.1 % spray; Spray 1-2 sprays into both nostrils 2 times daily  Dispense: 1 Bottle; Refill: 1  -Continue exercise and weight loss.     4. Encounter for surveillance of contraceptives  Currently on the ring and forgets to put it in. Only sexually active about once per year. On menses now. Recommended IUD given difficulty adhering to daily/monthly medication, age and obesity.   - OB/GYN REFERRAL      (E66.01) Morbid obesity, unspecified  "obesity type (H)  Comment: BMI 47. Has started exercising and eating healthier.   Plan: Discussed options. Bariatric surgery referral. Endocrinology referral, etc.   -Endo referral placed        COUNSELING:   Reviewed preventive health counseling, as reflected in patient instructions         reports that she has never smoked. She has never used smokeless tobacco.    Estimated body mass index is 47.04 kg/(m^2) as calculated from the following:    Height as of this encounter: 5' 1.75\" (1.568 m).    Weight as of this encounter: 255 lb 1.6 oz (115.7 kg).   Weight management plan: Discussed healthy diet and exercise guidelines and patient will follow up in 12 months in clinic to re-evaluate.    Counseling Resources:  ATP IV Guidelines  Pooled Cohorts Equation Calculator  Breast Cancer Risk Calculator  FRAX Risk Assessment  ICSI Preventive Guidelines  Dietary Guidelines for Americans, 2010  USDA's MyPlate  ASA Prophylaxis  Lung CA Screening    Jackelin Parker, JOSEFINA [facility] BRET  "

## 2017-05-12 NOTE — TELEPHONE ENCOUNTER
Enrrique Orozco,    It was great to meet you today. In thinking more about your busy schedule and goal to lose weight, I really think it would be a good idea to see endocrinology to discuss weight loss medications. They can be super helpful in giving a boost to your already good habits including exercise and eating healthier. Please give them a call to schedule.     [facility]: [facility] [phone] .    All the best,    Jackelin Parker, TRELL-BLADIMIR.

## 2017-05-13 ASSESSMENT — ASTHMA QUESTIONNAIRES: ACT_TOTALSCORE: 21

## 2017-05-15 ENCOUNTER — RADIANT APPOINTMENT (OUTPATIENT)
Dept: MAMMOGRAPHY | Facility: CLINIC | Age: 47
End: 2017-05-15
Attending: NURSE PRACTITIONER
Payer: COMMERCIAL

## 2017-05-15 DIAGNOSIS — Z00.00 ROUTINE HEALTH MAINTENANCE: ICD-10-CM

## 2017-05-15 LAB
CHOLEST SERPL-MCNC: 195 MG/DL
GLUCOSE SERPL-MCNC: 94 MG/DL (ref 70–99)
HDLC SERPL-MCNC: 45 MG/DL
LDLC SERPL CALC-MCNC: 102 MG/DL
NONHDLC SERPL-MCNC: 150 MG/DL
TRIGL SERPL-MCNC: 239 MG/DL

## 2017-05-15 PROCEDURE — G0202 SCR MAMMO BI INCL CAD: HCPCS | Mod: TC

## 2017-05-15 PROCEDURE — 77063 BREAST TOMOSYNTHESIS BI: CPT | Mod: TC

## 2017-05-15 PROCEDURE — 80061 LIPID PANEL: CPT | Performed by: NURSE PRACTITIONER

## 2017-05-15 PROCEDURE — 36415 COLL VENOUS BLD VENIPUNCTURE: CPT | Performed by: NURSE PRACTITIONER

## 2017-05-15 PROCEDURE — 82947 ASSAY GLUCOSE BLOOD QUANT: CPT | Performed by: NURSE PRACTITIONER

## 2017-05-18 LAB
COPATH REPORT: NORMAL
PAP: NORMAL

## 2017-05-19 LAB
FINAL DIAGNOSIS: NORMAL
HPV HR 12 DNA CVX QL NAA+PROBE: NEGATIVE
HPV16 DNA SPEC QL NAA+PROBE: NEGATIVE
HPV18 DNA SPEC QL NAA+PROBE: NEGATIVE
SPECIMEN DESCRIPTION: NORMAL

## 2017-06-06 DIAGNOSIS — R87.618 BENIGN-APPEARING ENDOMETRIAL CELLS ON CERVICAL CYTOLOGY: Primary | ICD-10-CM

## 2017-06-13 ENCOUNTER — OFFICE VISIT (OUTPATIENT)
Dept: PEDIATRICS | Facility: CLINIC | Age: 47
End: 2017-06-13
Payer: COMMERCIAL

## 2017-06-13 VITALS
BODY MASS INDEX: 47 KG/M2 | OXYGEN SATURATION: 97 % | DIASTOLIC BLOOD PRESSURE: 72 MMHG | SYSTOLIC BLOOD PRESSURE: 120 MMHG | HEART RATE: 83 BPM | HEIGHT: 62 IN | TEMPERATURE: 98.1 F | WEIGHT: 255.4 LBS

## 2017-06-13 VITALS
HEART RATE: 83 BPM | HEIGHT: 62 IN | OXYGEN SATURATION: 97 % | BODY MASS INDEX: 46.83 KG/M2 | WEIGHT: 254.5 LBS | TEMPERATURE: 98.1 F | SYSTOLIC BLOOD PRESSURE: 120 MMHG | DIASTOLIC BLOOD PRESSURE: 72 MMHG

## 2017-06-13 DIAGNOSIS — Z32.00 PREGNANCY EXAMINATION OR TEST, PREGNANCY UNCONFIRMED: ICD-10-CM

## 2017-06-13 DIAGNOSIS — J45.40 MODERATE PERSISTENT ASTHMA WITHOUT COMPLICATION: Primary | ICD-10-CM

## 2017-06-13 DIAGNOSIS — Z30.017 NEXPLANON INSERTION: Primary | ICD-10-CM

## 2017-06-13 LAB — BETA HCG QUAL IFA URINE: NEGATIVE

## 2017-06-13 PROCEDURE — 11981 INSERTION DRUG DLVR IMPLANT: CPT | Performed by: NURSE PRACTITIONER

## 2017-06-13 PROCEDURE — 99213 OFFICE O/P EST LOW 20 MIN: CPT | Performed by: NURSE PRACTITIONER

## 2017-06-13 PROCEDURE — 99207 ZZC DROP WITH A PROCEDURE: CPT | Performed by: NURSE PRACTITIONER

## 2017-06-13 PROCEDURE — 84703 CHORIONIC GONADOTROPIN ASSAY: CPT | Performed by: NURSE PRACTITIONER

## 2017-06-13 RX ORDER — FLUTICASONE PROPIONATE 50 MCG
2 SPRAY, SUSPENSION (ML) NASAL DAILY
COMMUNITY
End: 2018-04-09

## 2017-06-13 NOTE — NURSING NOTE
"Chief Complaint   Patient presents with     Asthma     follow up       Initial /72 (Cuff Size: Adult Large)  Pulse 83  Temp 98.1  F (36.7  C) (Tympanic)  Ht 5' 1.75\" (1.568 m)  Wt 255 lb 6.4 oz (115.8 kg)  SpO2 97%  BMI 47.09 kg/m2 Estimated body mass index is 47.09 kg/(m^2) as calculated from the following:    Height as of this encounter: 5' 1.75\" (1.568 m).    Weight as of this encounter: 255 lb 6.4 oz (115.8 kg).  Medication Reconciliation: complete   Michelle Fitzgerald CMA    "

## 2017-06-13 NOTE — PROGRESS NOTES
SUBJECTIVE:                                                    Pam Gaviria is a 46 year old female who presents to clinic today for the following health issues:    NEXPLANON PLACEMENT:    The patient meets and is agreeable to the following conditions:  She is not interested in conception in the near future.  There is no history of unresolved abnormal uterine bleeding.  There is no history of an unresolved abnormal PAP smear.  She has no history of diabetes, AIDS, leukemia, IV drug use or chronic steroid use.  She denies the possibility of pregnancy. Patient's last menstrual period was 08/27/2016.  Pregnancy test today is Neg.    The patient was given patient information on the Nexplanon and the patient education brochure from the . The patient has given consent to proceed with placement of the Nexplanon.  A written consent form is present in the chart.  She wishes to proceed.    Problem list and histories reviewed & adjusted, as indicated.  Additional history: as documented    Patient Active Problem List   Diagnosis     CARDIOVASCULAR SCREENING; LDL GOAL LESS THAN 160     Chronic rhinitis     Heel pain     Moderate persistent asthma     Latent tuberculosis     Morbid obesity (H)     No past surgical history on file.    Social History   Substance Use Topics     Smoking status: Never Smoker     Smokeless tobacco: Never Used     Alcohol use Yes      Comment: 1 time evry 3 months, 1 per time     Family History   Problem Relation Age of Onset     Thyroid Disease Mother      Heart Defect Mother      mitral valve prolapse           Reviewed and updated as needed this visit by clinical staff  Tobacco  Allergies  Meds  Med Hx       Reviewed and updated as needed this visit by Provider         ROS:  Constitutional, HEENT, cardiovascular, pulmonary, gi and gu systems are negative, except as otherwise noted.    OBJECTIVE:                                                    /72  Pulse 83  Temp 98.1  " F (36.7  C) (Oral)  Ht 5' 1.75\" (1.568 m)  Wt 254 lb 8 oz (115.4 kg)  LMP 06/04/2017  SpO2 97%  BMI 46.93 kg/m2  Body mass index is 46.93 kg/(m^2).  GENERAL: healthy, alert and no distress  SKIN: no suspicious lesions or rashes  PSYCH: mentation appears normal, affect normal/bright       PROCEDURE:  Lot number [id number]   Exp date: 5/2019  NDC# 3995-8928-21  Type of Device: Nexplanon  The patient lied in supine with the full length of her left side arm exposed and supported by the pillow and table.   The positioning the upper inner aspect of her arm was bent at her elbow to 90 degrees and rotated upward and outward opposite her head.  The insertion site was Identified approximately four finger  breadths/ 8cm superior and lateral to the medial epicondyle of the humerus.  Skin was marked to help guide insertion. One chepe is made where the yanely will be inserted and a second chepe is made a few centimeters proximal to the first chepe to serve as a direction guide during insertion.  The arm was cleansed the insertion site with an antiseptic solution.  Anesthesia using Lidocaine 1% was injected into the dermis to raise a wheal along the planned track of the yanely insertion needle.   The clear plastic needle cover was removed. The needle was placed against the insertion site holding the applicator at an angle 30 degrees to the skin. While applying counter traction to the skin around the insertion site, the skin was punctured with the needle tip. The applicator was the lowered so that it is parallel to the skin and then the needle was advanced in the subdermal connective tissue while lifting the skin with the tip of the needle. The needle was advanced to its full length under the skin. I unlocked the slider with downward finger pressure on the lever, then moved the slider fully backward (distally) and removed the applicator.  Immediately after insertion, I palpated the skin to verify correct placement of the yanely; both ends " were palpable. Patient was also asked to feel her implant.  An adhesive closure was placed on the insertion puncture and the site was wrapped with a pressure bandage.   Patient's User Card was completed and given to patient.     The patient tolerated this procedure without immediate complication. .          ASSESSMENT/PLAN:                                                      1. Pregnancy examination or test, pregnancy unconfirmed    - Beta HCG qual IFA urine      The patient is to return or call immediately for any unexplained fever, redness, pain and swelling at the insertion site. Recommended to take Tylenol or NSAID for mild to moderate pain    Discussed the risk of spotting, and spotting that lasts beyond 3 months may not resolve.   Patient was instructed may remove the pressure bandage in 24 hours and the small bandage over the insertion site after 3-5 days.   A completed the User Card was given to the patient to keep.     Information on Nexplanon was given to patient. She was instructed to watch for signs of infection such as redness, swelling, discharge, watch for increased bleeding, worsening pain and fever and to RTC ASAP. Questions and concerns were addressed.    JOSEFINA Marcelino [facility]

## 2017-06-13 NOTE — NURSING NOTE
"Chief Complaint   Patient presents with     Contraception     nexplanon       Initial /72  Pulse 83  Temp 98.1  F (36.7  C) (Oral)  Ht 5' 1.75\" (1.568 m)  Wt 254 lb 8 oz (115.4 kg)  LMP 06/04/2017  SpO2 97%  BMI 46.93 kg/m2 Estimated body mass index is 46.93 kg/(m^2) as calculated from the following:    Height as of this encounter: 5' 1.75\" (1.568 m).    Weight as of this encounter: 254 lb 8 oz (115.4 kg).  Medication Reconciliation: complete   Madonna Carlos, GISELA    "

## 2017-06-13 NOTE — Clinical Note
Hi there,  This patient cannot afford her Advair. SHe has medica choice. She said it was going to be about $85 per month.  Do you have any tips?  Thanks!  Jackelin

## 2017-06-13 NOTE — PATIENT INSTRUCTIONS
Watch for any signs of infection.  Redness, swelling, increased pain, fever.  Follow up if these occur.  Keep the outer larger bandage on for 1 day, the band aid on for 3-5 days.  Call with concerns.

## 2017-06-13 NOTE — MR AVS SNAPSHOT
After Visit Summary   6/13/2017    Pam Gaviria    MRN: [id number]           Patient Information     Date Of Birth          1970        Visit Information        Provider Department      6/13/2017 12:45 PM Jimena Brennan APRN [facility]NP [facility]        Today's Diagnoses     Pregnancy examination or test, pregnancy unconfirmed    -  1      Care Instructions    Watch for any signs of infection.  Redness, swelling, increased pain, fever.  Follow up if these occur.  Keep the outer larger bandage on for 1 day, the band aid on for 3-5 days.  Call with concerns.            Follow-ups after your visit        Who to contact     If you have questions or need follow up information about today's clinic visit or your schedule please contact [facility] directly at [phone].  Normal or non-critical lab and imaging results will be communicated to you by MyChart, letter or phone within 4 business days after the clinic has received the results. If you do not hear from us within 7 days, please contact the clinic through [facility] or phone. If you have a critical or abnormal lab result, we will notify you by phone as soon as possible.  Submit refill requests through [employer] or call your pharmacy and they will forward the refill request to us. Please allow 3 business days for your refill to be completed.          Additional Information About Your Visit        MyChart Information     [employer] gives you secure access to your electronic health record. If you see a primary care provider, you can also send messages to your care team and make appointments. If you have questions, please call your primary care clinic.  If you do not have a primary care provider, please call [phone] and they will assist you.        Care EveryWhere ID     This is your Care EveryWhere ID. This could be used by other organizations to access your [facility] medical records  [id number]        Your Vitals Were      "Pulse Temperature Height Last Period Pulse Oximetry BMI (Body Mass Index)    83 98.1  F (36.7  C) (Oral) 5' 1.75\" (1.568 m) 06/04/2017 97% 46.93 kg/m2       Blood Pressure from Last 3 Encounters:   06/13/17 120/72   06/13/17 120/72   05/12/17 112/66    Weight from Last 3 Encounters:   06/13/17 254 lb 8 oz (115.4 kg)   06/13/17 255 lb 6.4 oz (115.8 kg)   05/12/17 255 lb 1.6 oz (115.7 kg)              We Performed the Following     Beta HCG qual IFA urine        Primary Care Provider Office Phone # Fax #    JOSEFINA Horne [phone]       [facility] [address] [place] [address]        Thank you!     Thank you for choosing [facility]  for your care. Our goal is always to provide you with excellent care. Hearing back from our patients is one way we can continue to improve our services. Please take a few minutes to complete the written survey that you may receive in the mail after your visit with us. Thank you!             Your Updated Medication List - Protect others around you: Learn how to safely use, store and throw away your medicines at www.disposemymeds.org.          This list is accurate as of: 6/13/17  1:10 PM.  Always use your most recent med list.                   Brand Name Dispense Instructions for use    albuterol 108 (90 BASE) MCG/ACT Inhaler    PROAIR HFA/PROVENTIL HFA/VENTOLIN HFA    1 Inhaler    Inhale 2 puffs into the lungs every 6 hours as needed for shortness of breath / dyspnea or wheezing       ARNUITY ELLIPTA 100 MCG/ACT Aepb inhalation powder   Generic drug:  fluticasone furoate      Inhale 1 puff into the lungs daily       azelastine 0.1 % spray    ASTELIN    1 Bottle    Spray 1-2 sprays into both nostrils 2 times daily       EYE DROPS ALLERGY RELIEF OP          fluticasone 50 MCG/ACT spray    FLONASE     Spray 2 sprays into both nostrils daily       fluticasone-salmeterol 100-50 MCG/DOSE diskus inhaler    ADVAIR    1 " Inhaler    Inhale 1 puff into the lungs 2 times daily       montelukast 10 MG tablet    SINGULAIR    90 tablet    Take 1 tablet (10 mg) by mouth At Bedtime

## 2017-06-13 NOTE — PROGRESS NOTES
"  SUBJECTIVE:                                                    Pam Gaviria is a 46 year old female who presents to clinic today for the following health issues:    Asthma Follow-Up  Not taking advair due to cost    Was ACT completed today?    Yes    ACT Total Scores 6/13/2017   ACT TOTAL SCORE (Goal Greater than or Equal to 20) 24   In the past 12 months, how many times did you visit the emergency room for your asthma without being admitted to the hospital? 0   In the past 12 months, how many times were you hospitalized overnight because of your asthma? 0       Recent asthma triggers that patient is dealing with: None      Amount of exercise or physical activity: 4-5 days/week for an average of 30-45 minutes    Problems taking medications regularly: Yes,  cost of medication - advair    Medication side effects: none    Diet: regular (no restrictions) - trying to be healthy    ROS: const/resp otherwise negative     OBJECTIVE:  /72 (Cuff Size: Adult Large)  Pulse 83  Temp 98.1  F (36.7  C) (Tympanic)  Ht 5' 1.75\" (1.568 m)  Wt 255 lb 6.4 oz (115.8 kg)  SpO2 97%  BMI 47.09 kg/m2  CONSTITUTIONAL: Alert, well-nourished, well-groomed, NAD  RESP: Lungs with slight wheezing without stethoscope. However, upon auscultation with stethoscope no wheezing.   CV: HRRR S1 S2 No MRG. No peripheral edema      ASSESSMENT/PLAN:  (J45.40) Moderate persistent asthma without complication  (primary encounter diagnosis)  Comment: Likely uncontrolled given exam but subjectively controlled. Reports even better control with Advair but can't afford.   Plan: Continue Arnuity Ellipta (ICS). I messaged MTM about a cheaper ICS-LABA combination.  Discussed supportive cares and reasons to return. Discussed reasons to seek care urgently.         Jackelin Parker, FNP-DNP.        "

## 2017-06-13 NOTE — MR AVS SNAPSHOT
After Visit Summary   6/13/2017    Pam Gaviria    MRN: [id number]           Patient Information     Date Of Birth          1970        Visit Information        Provider Department      6/13/2017 8:20 AM Jackelin Parker APRN CNP [facility]        Today's Diagnoses     Moderate persistent asthma without complication    -  1       Follow-ups after your visit        Your next 10 appointments already scheduled     Jun 13, 2017 12:45 PM CDT   Office Visit with JOSEFINA Barrientos CNP   [facility]    [address]  [address]  [facility] [address] [phone]           Bring a current list of meds and any records pertaining to this visit.  For Physicals, please bring immunization records and any forms needing to be filled out.  Please arrive 10 minutes early to complete paperwork.              Who to contact     If you have questions or need follow up information about today's clinic visit or your schedule please contact [facility] directly at [phone].  Normal or non-critical lab and imaging results will be communicated to you by [employer], letter or phone within 4 business days after the clinic has received the results. If you do not hear from us within 7 days, please contact the clinic through [employer] or phone. If you have a critical or abnormal lab result, we will notify you by phone as soon as possible.  Submit refill requests through Wanderfly or call your pharmacy and they will forward the refill request to us. Please allow 3 business days for your refill to be completed.          Additional Information About Your Visit        [employer] Information     Wanderfly gives you secure access to your electronic health record. If you see a primary care provider, you can also send messages to your care team and make appointments. If you have questions, please call your primary care clinic.  If you do not have a  "primary care provider, please call [phone] and they will assist you.        Care EveryWhere ID     This is your Care EveryWhere ID. This could be used by other organizations to access your [facility] medical records  [id number]        Your Vitals Were     Pulse Temperature Height Pulse Oximetry BMI (Body Mass Index)       83 98.1  F (36.7  C) (Tympanic) 5' 1.75\" (1.568 m) 97% 47.09 kg/m2        Blood Pressure from Last 3 Encounters:   06/13/17 120/72   05/12/17 112/66   04/26/17 110/76    Weight from Last 3 Encounters:   06/13/17 255 lb 6.4 oz (115.8 kg)   05/12/17 255 lb 1.6 oz (115.7 kg)   04/26/17 259 lb (117.5 kg)              Today, you had the following     No orders found for display       Primary Care Provider Office Phone # Fax #    Mandeep Alonso [phone]       [facility] [address] [place] [address] [address] [place] [address]        Thank you!     Thank you for choosing [facility]  for your care. Our goal is always to provide you with excellent care. Hearing back from our patients is one way we can continue to improve our services. Please take a few minutes to complete the written survey that you may receive in the mail after your visit with us. Thank you!             Your Updated Medication List - Protect others around you: Learn how to safely use, store and throw away your medicines at www.disposemymeds.org.          This list is accurate as of: 6/13/17  8:51 AM.  Always use your most recent med list.                   Brand Name Dispense Instructions for use    albuterol 108 (90 BASE) MCG/ACT Inhaler    PROAIR HFA/PROVENTIL HFA/VENTOLIN HFA    1 Inhaler    Inhale 2 puffs into the lungs every 6 hours as needed for shortness of breath / dyspnea or wheezing       ARNUITY ELLIPTA 100 MCG/ACT Aepb inhalation powder   Generic drug:  fluticasone furoate      Inhale 1 puff into the lungs daily       * azelastine 0.1 % spray    ASTELIN     Spray 1 spray into both nostrils 2 " times daily       * azelastine 0.1 % spray    ASTELIN    1 Bottle    Spray 1-2 sprays into both nostrils 2 times daily       etonogestrel-ethinyl estradiol 0.12-0.015 MG/24HR vaginal ring    NUVARING    3 each    Place 1 ring every 21 days then remove for 1 week.       EYE DROPS ALLERGY RELIEF OP          fluticasone 50 MCG/ACT spray    FLONASE     Spray 2 sprays into both nostrils daily       fluticasone-salmeterol 100-50 MCG/DOSE diskus inhaler    ADVAIR    1 Inhaler    Inhale 1 puff into the lungs 2 times daily       montelukast 10 MG tablet    SINGULAIR    90 tablet    Take 1 tablet (10 mg) by mouth At Bedtime       * Notice:  This list has 2 medication(s) that are the same as other medications prescribed for you. Read the directions carefully, and ask your doctor or other care provider to review them with you.

## 2017-06-13 NOTE — PROGRESS NOTES
"  SUBJECTIVE:                                                    Pam Gaviria is a 46 year old female who presents to clinic today for the following health issues:      nexplanon consult and insertion    {additional problems for provider to add:548568}    Problem list and histories reviewed & adjusted, as indicated.  Additional history: {NONE - AS DOCUMENTED:122559::\"as documented\"}    {HIST REVIEW/ LINKS 2:902060}    Reviewed and updated as needed this visit by clinical staff  Tobacco  Allergies  Meds  Med Hx       Reviewed and updated as needed this visit by Provider         {PROVIDER CHARTING PREFERENCE:731855}    "

## 2017-06-14 ENCOUNTER — TELEPHONE (OUTPATIENT)
Dept: PEDIATRICS | Facility: CLINIC | Age: 47
End: 2017-06-14

## 2017-06-14 RX ORDER — FLUTICASONE PROPIONATE AND SALMETEROL 55; 14 UG/1; UG/1
1 POWDER, METERED RESPIRATORY (INHALATION) 2 TIMES DAILY
Qty: 1 EACH | Refills: 1 | Status: SHIPPED | OUTPATIENT
Start: 2017-06-14 | End: 2017-07-14

## 2017-06-14 ASSESSMENT — ASTHMA QUESTIONNAIRES: ACT_TOTALSCORE: 24

## 2017-06-14 NOTE — TELEPHONE ENCOUNTER
Please let pt know I ordered an inhaler that might work as well as the advair but is generic. Pls have her call pharmacy and let us know how much it was. Then have her let us know if she decided to use it.

## 2017-06-15 ENCOUNTER — TELEPHONE (OUTPATIENT)
Dept: PEDIATRICS | Facility: CLINIC | Age: 47
End: 2017-06-15

## 2017-06-15 NOTE — TELEPHONE ENCOUNTER
Prior authorization request received from [facility] for Fluticasone-Salmeterol inhaler (Advair).  [id number]  Michelle Fitzgerald CMA

## 2017-06-19 NOTE — TELEPHONE ENCOUNTER
Response received from insurance.  Fluticasone-Salmeterol  55-14mcg aer pow BA was approved.  Approval dates 6/19/17 - 6/19/18.  Michelle Fitzgerald, CMA

## 2017-06-19 NOTE — TELEPHONE ENCOUNTER
Provider completed form.  Faxed to [facility] at [phone].  Waiting response.  Michelle Fitzgerald, CMA

## 2017-11-03 DIAGNOSIS — J45.41 MODERATE PERSISTENT ASTHMA WITH ACUTE EXACERBATION: ICD-10-CM

## 2017-11-06 RX ORDER — AZELASTINE 1 MG/ML
1-2 SPRAY, METERED NASAL 2 TIMES DAILY
Qty: 1 BOTTLE | Refills: 2 | Status: SHIPPED | OUTPATIENT
Start: 2017-11-06 | End: 2018-05-07

## 2017-11-06 NOTE — TELEPHONE ENCOUNTER
Requested Prescriptions   Pending Prescriptions Disp Refills     fluticasone-salmeterol (ADVAIR) 100-50 MCG/DOSE diskus inhaler 1 Inhaler 1     Sig: Inhale 1 puff into the lungs 2 times daily    Inhaled Steroids Protocol Passed    11/3/2017  2:55 PM       Passed - Patient is age 12 or older       Passed - Asthma control test 20 or greater in last 6 months    Please review ACT score.          Passed - Recent (6 mo) or future visit with authorizing provider's specialty    Patient had office visit in the last 6 months or has a visit in the next 30 days with authorizing provider.  See chart review.             Prescription approved per [facility] Refill Protocol.

## 2017-11-06 NOTE — TELEPHONE ENCOUNTER
Prescription approved per [facility] Refill Protocol.  Ivana Verdugo RN

## 2017-11-17 ENCOUNTER — TELEPHONE (OUTPATIENT)
Dept: PEDIATRICS | Facility: CLINIC | Age: 47
End: 2017-11-17

## 2017-11-17 DIAGNOSIS — J45.40 MODERATE PERSISTENT ASTHMA WITHOUT COMPLICATION: ICD-10-CM

## 2017-11-17 NOTE — TELEPHONE ENCOUNTER
Prescription approved per [facility] Refill Protocol.  Not sure if patient is continuing to use the Advair due to cost, this  Was an alternate.  Brooke Johnston, RN  Triage Nurse

## 2017-12-04 DIAGNOSIS — J45.41 MODERATE PERSISTENT ASTHMA WITH ACUTE EXACERBATION: ICD-10-CM

## 2017-12-07 NOTE — TELEPHONE ENCOUNTER
Requested Prescriptions   Pending Prescriptions Disp Refills     fluticasone-salmeterol (ADVAIR) 100-50 MCG/DOSE diskus inhaler 1 Inhaler 1     Sig: Inhale 1 puff into the lungs 2 times daily    Inhaled Steroids Protocol Passed    12/4/2017  8:34 PM       Passed - Patient is age 12 or older       Passed - Asthma control test 20 or greater in last 6 months    Please review ACT score.          Passed - Recent (6 mo) or future visit with authorizing provider's specialty    Patient had office visit in the last 6 months or has a visit in the next 30 days with authorizing provider.  See chart review.             Medication is being filled for 1 time refill only due to:  Due for 6 month asthma follow up     Marion Bhatt RN -- [facility]

## 2018-01-30 ENCOUNTER — ALLIED HEALTH/NURSE VISIT (OUTPATIENT)
Dept: NURSING | Facility: CLINIC | Age: 48
End: 2018-01-30
Payer: COMMERCIAL

## 2018-01-30 DIAGNOSIS — Z23 NEED FOR PROPHYLACTIC VACCINATION AND INOCULATION AGAINST INFLUENZA: Primary | ICD-10-CM

## 2018-01-30 PROCEDURE — 90686 IIV4 VACC NO PRSV 0.5 ML IM: CPT

## 2018-01-30 PROCEDURE — 90471 IMMUNIZATION ADMIN: CPT

## 2018-01-30 PROCEDURE — 99207 ZZC NO CHARGE NURSE ONLY: CPT

## 2018-01-30 NOTE — MR AVS SNAPSHOT
After Visit Summary   1/30/2018    Pam Gaviria    MRN: [id number]           Patient Information     Date Of Birth          1970        Visit Information        Provider Department      1/30/2018 4:30 PM AMOR NURSE [facility]        Today's Diagnoses     Need for prophylactic vaccination and inoculation against influenza    -  1       Follow-ups after your visit        Your next 10 appointments already scheduled     Feb 06, 2018  9:00 AM CST   [facility] with JOSEFINA Adler CNP   [facility]    [address]  [facility] [id number] [phone]              Who to contact     If you have questions or need follow up information about today's clinic visit or your schedule please contact [facility] directly at [phone].  Normal or non-critical lab and imaging results will be communicated to you by Scalad[employer]art, letter or phone within 4 business days after the clinic has received the results. If you do not hear from us within 7 days, please contact the clinic through [employer] or phone. If you have a critical or abnormal lab result, we will notify you by phone as soon as possible.  Submit refill requests through Diagnoplex or call your pharmacy and they will forward the refill request to us. Please allow 3 business days for your refill to be completed.          Additional Information About Your Visit        MyChart Information     [employer] gives you secure access to your electronic health record. If you see a primary care provider, you can also send messages to your care team and make appointments. If you have questions, please call your primary care clinic.  If you do not have a primary care provider, please call [phone] and they will assist you.        Care EveryWhere ID     This is your Care EveryWhere ID. This could be used by other organizations to access your [facility]  records  [id number]         Blood Pressure from Last 3 Encounters:   06/13/17 120/72   06/13/17 120/72   05/12/17 112/66    Weight from Last 3 Encounters:   06/13/17 254 lb 8 oz (115.4 kg)   06/13/17 255 lb 6.4 oz (115.8 kg)   05/12/17 255 lb 1.6 oz (115.7 kg)              We Performed the Following     ADMIN 1st VACCINE     FLU VAC, SPLIT VIRUS IM > 3 YO (QUADRIVALENT) [[id number]]        Primary Care Provider Office Phone # Fax #    JOSEFINA Horne [phone] [address] [place] [address]        Equal Access to Services     [facility]: Hadii timothy garzono Sodaphnie, waaxda luqadaha, qaybta kaalmada adeegyada, yi hernandez . So [place] [phone].    ATENCIÓN: Si habla español, tiene a pedraza disposición servicios gratuitos de asistencia lingüística. LlThe [facility] [phone].    We comply with applicable federal civil rights laws and [place] laws. We do not discriminate on the basis of race, color, national origin, age, disability, sex, sexual orientation, or gender identity.            Thank you!     Thank you for choosing [facility]  for your care. Our goal is always to provide you with excellent care. Hearing back from our patients is one way we can continue to improve our services. Please take a few minutes to complete the written survey that you may receive in the mail after your visit with us. Thank you!             Your Updated Medication List - Protect others around you: Learn how to safely use, store and throw away your medicines at www.disposemymeds.org.          This list is accurate as of 1/30/18  4:39 PM.  Always use your most recent med list.                   Brand Name Dispense Instructions for use Diagnosis    albuterol 108 (90 BASE) MCG/ACT Inhaler    PROAIR HFA/PROVENTIL HFA/VENTOLIN HFA    1 Inhaler    Inhale 2 puffs into the lungs every 6 hours as needed for shortness of breath / dyspnea or wheezing     Moderate persistent asthma with acute exacerbation       azelastine 0.1 % spray    ASTELIN    1 Bottle    Spray 1-2 sprays into both nostrils 2 times daily    Moderate persistent asthma with acute exacerbation       etonogestrel 68 MG Impl    IMPLANON/NEXPLANON    1 each    1 each (68 mg) by Subdermal route once for 1 dose    Nexplanon insertion       EYE DROPS ALLERGY RELIEF OP           fluticasone 50 MCG/ACT spray    FLONASE     Spray 2 sprays into both nostrils daily        fluticasone furoate 100 MCG/ACT Aepb inhalation powder    ARNUITY ELLIPTA    1 each    Inhale 1 puff into the lungs daily    Moderate persistent asthma without complication       fluticasone-salmeterol 100-50 MCG/DOSE diskus inhaler    ADVAIR    1 Inhaler    Inhale 1 puff into the lungs 2 times daily    Moderate persistent asthma with acute exacerbation       montelukast 10 MG tablet    SINGULAIR    90 tablet    Take 1 tablet (10 mg) by mouth At Bedtime    Chronic rhinitis

## 2018-01-30 NOTE — PROGRESS NOTES
Patient here today for flu shot.      Injectable Influenza Immunization Documentation    1.  Is the person to be vaccinated sick today?   No    2. Does the person to be vaccinated have an allergy to a component   of the vaccine?   No  Egg Allergy Algorithm Link    3. Has the person to be vaccinated ever had a serious reaction   to influenza vaccine in the past?   No    4. Has the person to be vaccinated ever had Guillain-Barré syndrome?   No    Form completed by Michelle Fitzgerald CMA

## 2018-02-02 PROCEDURE — 82530 CORTISOL FREE: CPT | Mod: 90 | Performed by: CLINICAL NURSE SPECIALIST

## 2018-02-02 PROCEDURE — 99000 SPECIMEN HANDLING OFFICE-LAB: CPT | Performed by: CLINICAL NURSE SPECIALIST

## 2018-02-06 ENCOUNTER — OFFICE VISIT (OUTPATIENT)
Dept: ENDOCRINOLOGY | Facility: CLINIC | Age: 48
End: 2018-02-06
Payer: COMMERCIAL

## 2018-02-06 VITALS
WEIGHT: 255.6 LBS | BODY MASS INDEX: 47.04 KG/M2 | SYSTOLIC BLOOD PRESSURE: 102 MMHG | DIASTOLIC BLOOD PRESSURE: 70 MMHG | HEART RATE: 80 BPM | HEIGHT: 62 IN | TEMPERATURE: 98.6 F

## 2018-02-06 DIAGNOSIS — E66.01 MORBID OBESITY (H): ICD-10-CM

## 2018-02-06 DIAGNOSIS — Z13.6 CARDIOVASCULAR SCREENING; LDL GOAL LESS THAN 160: Primary | ICD-10-CM

## 2018-02-06 LAB — HBA1C MFR BLD: 5.4 % (ref 4.3–6)

## 2018-02-06 PROCEDURE — 99214 OFFICE O/P EST MOD 30 MIN: CPT | Performed by: CLINICAL NURSE SPECIALIST

## 2018-02-06 PROCEDURE — 36415 COLL VENOUS BLD VENIPUNCTURE: CPT | Performed by: CLINICAL NURSE SPECIALIST

## 2018-02-06 PROCEDURE — 93000 ELECTROCARDIOGRAM COMPLETE: CPT | Performed by: CLINICAL NURSE SPECIALIST

## 2018-02-06 PROCEDURE — 80053 COMPREHEN METABOLIC PANEL: CPT | Performed by: CLINICAL NURSE SPECIALIST

## 2018-02-06 PROCEDURE — 83036 HEMOGLOBIN GLYCOSYLATED A1C: CPT | Performed by: CLINICAL NURSE SPECIALIST

## 2018-02-06 PROCEDURE — 84443 ASSAY THYROID STIM HORMONE: CPT | Performed by: CLINICAL NURSE SPECIALIST

## 2018-02-06 RX ORDER — FLUTICASONE PROPIONATE AND SALMETEROL 55; 14 UG/1; UG/1
POWDER, METERED RESPIRATORY (INHALATION)
COMMUNITY
Start: 2017-07-27 | End: 2018-05-07 | Stop reason: ALTCHOICE

## 2018-02-06 RX ORDER — PHENTERMINE HYDROCHLORIDE 37.5 MG/1
37.5 TABLET ORAL
Qty: 32 TABLET | Refills: 0 | Status: SHIPPED | OUTPATIENT
Start: 2018-02-06 | End: 2018-03-08

## 2018-02-06 NOTE — PROGRESS NOTES
Name: Pam Gaviria  Seen at the request of No ref. provider found for obesity.  HPI:  Pam Gaviria is a 47 year old female who presents for the evaluation of obesity.  Tried Nutra system - lost 40 lbs but gained it back.  Worked with a  for 2 years - lost inches but not as much weight.  Tried multiple different diets:  [place] Diet, low carb diets, etc.  One of her biggest challenges is meal planning - her boys compete in [employer] and they travel nearly every weekend - eats most meals at fast food restaurants.  Interested in weight loss medication.    Menses: on etonogestrel so no menses, prior menses were regular  Diarrhea/Constipation:No  Changes in Hair or Skin:No  Diabetes:No  Sleep:  Sleep Apnea/Snores:Yes: snores but not all the time  Hypertension or CAD:No  Hyperlipidemia:Yes: elevated triglycerides  With low HDL  Hirsutism:No  Easy Bruising:No  Use of Steroids:No oral steroids, uses inhaler for asthma + nasal steroids for allergies  Family history of Obesity:Yes: father's side of the family  Diet:   Exercise:Yes: member of the [employer] but hasn't been going - too cold  PMH/PSH:  History reviewed. No pertinent past medical history.  History reviewed. No pertinent surgical history.  Family Hx:  Family History   Problem Relation Age of Onset     Thyroid Disease Mother      Heart Defect Mother      mitral valve prolapse     Thyroid disease: Yes: mother with hypothyroidism         DM2: No         Autoimmune: DM1, SLE, RA, Vitiligo No    Social Hx:  Social History     Social History     Marital status:      Spouse name: N/A     Number of children: N/A     Years of education: N/A     Occupational History     Not on file.     Social History Main Topics     Smoking status: Never Smoker     Smokeless tobacco: Never Used     Alcohol use Yes      Comment: 1 time evry 3 months, 1 per time     Drug use: No     Sexual activity: Yes     Partners: Male     Birth control/ protection: Inserts/Ring  "    Other Topics Concern     Not on file     Social History Narrative          MEDICATIONS:  has a current medication list which includes the following prescription(s): fluticasone-salmeterol, phentermine, fluticasone furoate, azelastine, fluticasone, montelukast, albuterol, tetrahydrozoline-zn sulfate, and etonogestrel.    ROS   ROS: 10 point ROS neg other than the symptoms noted above in the HPI.    GENERAL: no weight loss, fevers, chills, malaise, night sweats.   HEENT: no dysphagia, odynophagia, diplopia, neck pain or tenderness, dry/scratch eyes, URI, cough, sinus drainage, tinnitus, sinus pressure  CV: no chest pain, pressure, palpitations, skipped beats, LOC  LUNGS: no SOB, CASTRO, cough, sputum production, wheezing   ABDOMEN: no diarrhea, constipation, abdominal pain  EXTREMITIES: no rashes, ulcers, edema  NEUROLOGY: no changes in vision, tingling or numbness in hands or feet.   MSK: no muscle aches or pains, weakness  SKIN: no rashes or lesions  ENDOCRINE: no heat or cold intolerance      Physical Exam   VS: /70 (BP Location: Left arm, Patient Position: Chair, Cuff Size: Adult Large)  Pulse 80  Temp 98.6  F (37  C) (Oral)  Ht 1.568 m (5' 1.75\")  Wt 115.9 kg (255 lb 9.6 oz)  Breastfeeding? No  BMI 47.13 kg/m2  GENERAL: NAD, well dressed, answering questions appropriately, appears stated age.  HEENT: OP clear, no exophthalmos, no proptosis, EOMI, no lig lag, no retraction, no scleral icterus  RESPIRATORY: Clear. Normal respiratory effort.  CARDIOVASCULAR: RRR.  No peripheral edema.  EXTREMITIES: no edema, +pulses, no rashes, no lesions  NEUROLOGY: CN grossly intact, no tremors  MSK: grossly intact, No digital cyanosis. Normal gait and station.  SKIN: no rashes, no lesions, no ulcers  PSYCH: Intact judgment and insight. A&OX3 with a cordial affect.    LABS:  !COMPREHENSIVE Latest Ref Rng & Units 5/15/2017   Glucose 70 - 99 mg/dL 94     Component    Latest Ref Rng & Units 5/15/2017   Cholesterol    " "<200 mg/dL 195   Triglycerides    <150 mg/dL 239 (H)   HDL Cholesterol    >49 mg/dL 45 (L)   LDL Cholesterol Calculated    <100 mg/dL 102 (H)   Non HDL Cholesterol    <130 mg/dL 150 (H)     Vital Signs 2/6/2018   Weight (LB) 255 lb 9.6 oz   Height 5' 1.75\"   BMI (Calculated) 47.23    Waist Circumference:  49\" (today)    All pertinent notes, labs, and images personally reviewed by me.     A/P  Ms.Vanessa Gaviria is a 47 year old here for the evaluation of obesity:    1. Morbid Obesity-  BMI 47.23. Comorbidities - hyperlipidemia.  Obesity is associated with a significant increase in mortality and risk of many disorders, including diabetes mellitus, hypertension, dyslipidemia, heart disease, stroke, sleep apnea, cancer, and many others. Conversely, weight loss is associated with a reduction in obesity-associated morbidity.    Endocrine evaluation of obesity includes Diabetes/prediabetes, Cushing's and thyroid dysfunction.  The relevant work up for the diagnosis and management of obesity and various treatment options were discussed. Body Mass Index (BMI) has been a standard means for calculating risk for overweight and obesity. The new American Association of Clinical Endocrinology (AACE) algorithm recommends lifestyle modifications as the initial phase of treatment for all patients with the BMI equal or greater than 25 kg/m2. Lifestyle modifications includes use of medical nutrition therapy, exercise, tobacco cessation, adequate quality and quantity of sleep, limited consumption of alcohol and reduced stress through implementation of a structured, multidisciplinary program.    In patients with complications associated with obesity, graded interventions are recommended including pharmacological therapy such as phentermine, orlistat, lorcaserin and phentermine/topiramate ER, contrave ( buproprion/naltreone) and the use of very low calorie meal replacement programs.    If medical intervention is insufficient, surgical " therapy may be considered, especially in patients with BMI greater than or equal to 35 kg/m2 with multiple complications. Surgical treatments include lap-band, gastric sleeve or gastric bypass surgery.    I informed the pt that:  1.Weight loss medications can be taken to assist with weight reduction when combined with appropriate healthy lifestyle changes.  2.I discussed possible s/e, risks and benefits of weight loss medications.  3.All medications are FDA approved, however, some may be used ''off label'' for their weight loss benefits and some ''short term'' medications can be used for longer periods to achieve desired clinical outcomes.  4.All patients taking weight loss medications must be seen in clinic for refill authorization.    Counseling exercise ( V65.41) - start a regular exercise program  Dietary counseling( V65.3) - Recommend 8117-9812 gali/day  Calculated BMI above the upper parameter and f/u plan was documented in the medical record.  Discussed indications, risks and benefits of all medications prescribed, and answered questions to patient's satisfaction.  Start Phentermine 37.5 mg, 1/2 tab q am, increase to 1 tab q am as tolerated  Short term weight loss goal 5% of her current weight in the next 3 months = 12 lbs or approximately 4 lbs per month.    Labs ordered today:   Orders Placed This Encounter   Procedures     Hemoglobin A1c     Comprehensive metabolic panel     TSH with free T4 reflex     EKG 12-lead complete w/read - Clinics     Radiology/Consults ordered today: None    More than 50% of the time spent with Ms. Gaviria on counseling / coordinating her care.  Total face to face time was greater than or equal to 45 minutes.      Follow-up:  follow up in 1 month(s)    Marga Nichole NP  Endocrinology  [facility]  CC: Jackelin Parker   ____________________________________________________________

## 2018-02-06 NOTE — LETTER
2/6/2018         RE: Pam Gaviria  [address]  [place] [address]        Dear Colleague,    Thank you for referring your patient, Pam Gaviria, to the [facility]. Please see a copy of my visit note below.    Name: Pam Gaviria  Seen at the request of No ref. provider found for obesity.  HPI:  Pam Gaviria is a 47 year old female who presents for the evaluation of obesity.  Tried Nutra system - lost 40 lbs but gained it back.  Worked with a  for 2 years - lost inches but not as much weight.  Tried multiple different diets:  [place] Diet, low carb diets, etc.  One of her biggest challenges is meal planning - her boys compete in [employer] and they travel nearly every weekend - eats most meals at fast food restaurants.  Interested in weight loss medication.    Menses: on etonogestrel so no menses, prior menses were regular  Diarrhea/Constipation:No  Changes in Hair or Skin:No  Diabetes:No  Sleep:  Sleep Apnea/Snores:Yes: snores but not all the time  Hypertension or CAD:No  Hyperlipidemia:Yes: elevated triglycerides  With low HDL  Hirsutism:No  Easy Bruising:No  Use of Steroids:No oral steroids, uses inhaler for asthma + nasal steroids for allergies  Family history of Obesity:Yes: father's side of the family  Diet:   Exercise:Yes: member of the [employer] but hasn't been going - too cold  PMH/PSH:  History reviewed. No pertinent past medical history.  History reviewed. No pertinent surgical history.  Family Hx:  Family History   Problem Relation Age of Onset     Thyroid Disease Mother      Heart Defect Mother      mitral valve prolapse     Thyroid disease: Yes: mother with hypothyroidism         DM2: No         Autoimmune: DM1, SLE, RA, Vitiligo No    Social Hx:  Social History     Social History     Marital status:      Spouse name: N/A     Number of children: N/A     Years of education: N/A     Occupational History     Not on file.     Social History Main  "Topics     Smoking status: Never Smoker     Smokeless tobacco: Never Used     Alcohol use Yes      Comment: 1 time evry 3 months, 1 per time     Drug use: No     Sexual activity: Yes     Partners: Male     Birth control/ protection: Inserts/Ring     Other Topics Concern     Not on file     Social History Narrative          MEDICATIONS:  has a current medication list which includes the following prescription(s): fluticasone-salmeterol, phentermine, fluticasone furoate, azelastine, fluticasone, montelukast, albuterol, tetrahydrozoline-zn sulfate, and etonogestrel.    ROS   ROS: 10 point ROS neg other than the symptoms noted above in the HPI.    GENERAL: no weight loss, fevers, chills, malaise, night sweats.   HEENT: no dysphagia, odynophagia, diplopia, neck pain or tenderness, dry/scratch eyes, URI, cough, sinus drainage, tinnitus, sinus pressure  CV: no chest pain, pressure, palpitations, skipped beats, LOC  LUNGS: no SOB, CASTRO, cough, sputum production, wheezing   ABDOMEN: no diarrhea, constipation, abdominal pain  EXTREMITIES: no rashes, ulcers, edema  NEUROLOGY: no changes in vision, tingling or numbness in hands or feet.   MSK: no muscle aches or pains, weakness  SKIN: no rashes or lesions  ENDOCRINE: no heat or cold intolerance      Physical Exam   VS: /70 (BP Location: Left arm, Patient Position: Chair, Cuff Size: Adult Large)  Pulse 80  Temp 98.6  F (37  C) (Oral)  Ht 1.568 m (5' 1.75\")  Wt 115.9 kg (255 lb 9.6 oz)  Breastfeeding? No  BMI 47.13 kg/m2  GENERAL: NAD, well dressed, answering questions appropriately, appears stated age.  HEENT: OP clear, no exophthalmos, no proptosis, EOMI, no lig lag, no retraction, no scleral icterus  RESPIRATORY: Clear. Normal respiratory effort.  CARDIOVASCULAR: RRR.  No peripheral edema.  EXTREMITIES: no edema, +pulses, no rashes, no lesions  NEUROLOGY: CN grossly intact, no tremors  MSK: grossly intact, No digital cyanosis. Normal gait and station.  SKIN: no " "rashes, no lesions, no ulcers  PSYCH: Intact judgment and insight. A&OX3 with a cordial affect.    LABS:  !COMPREHENSIVE Latest Ref Rng & Units 5/15/2017   Glucose 70 - 99 mg/dL 94     Component    Latest Ref Rng & Units 5/15/2017   Cholesterol    <200 mg/dL 195   Triglycerides    <150 mg/dL 239 (H)   HDL Cholesterol    >49 mg/dL 45 (L)   LDL Cholesterol Calculated    <100 mg/dL 102 (H)   Non HDL Cholesterol    <130 mg/dL 150 (H)     Vital Signs 2/6/2018   Weight (LB) 255 lb 9.6 oz   Height 5' 1.75\"   BMI (Calculated) 47.23    Waist Circumference:  49\" (today)    All pertinent notes, labs, and images personally reviewed by me.     A/P  Ms.Vanessa Gaviria is a 47 year old here for the evaluation of obesity:    1. Morbid Obesity-  BMI 47.23. Comorbidities - hyperlipidemia.  Obesity is associated with a significant increase in mortality and risk of many disorders, including diabetes mellitus, hypertension, dyslipidemia, heart disease, stroke, sleep apnea, cancer, and many others. Conversely, weight loss is associated with a reduction in obesity-associated morbidity.    Endocrine evaluation of obesity includes Diabetes/prediabetes, Cushing's and thyroid dysfunction.  The relevant work up for the diagnosis and management of obesity and various treatment options were discussed. Body Mass Index (BMI) has been a standard means for calculating risk for overweight and obesity. The new American Association of Clinical Endocrinology (AACE) algorithm recommends lifestyle modifications as the initial phase of treatment for all patients with the BMI equal or greater than 25 kg/m2. Lifestyle modifications includes use of medical nutrition therapy, exercise, tobacco cessation, adequate quality and quantity of sleep, limited consumption of alcohol and reduced stress through implementation of a structured, multidisciplinary program.    In patients with complications associated with obesity, graded interventions are recommended " including pharmacological therapy such as phentermine, orlistat, lorcaserin and phentermine/topiramate ER, contrave ( buproprion/naltreone) and the use of very low calorie meal replacement programs.    If medical intervention is insufficient, surgical therapy may be considered, especially in patients with BMI greater than or equal to 35 kg/m2 with multiple complications. Surgical treatments include lap-band, gastric sleeve or gastric bypass surgery.    I informed the pt that:  1.Weight loss medications can be taken to assist with weight reduction when combined with appropriate healthy lifestyle changes.  2.I discussed possible s/e, risks and benefits of weight loss medications.  3.All medications are FDA approved, however, some may be used ''off label'' for their weight loss benefits and some ''short term'' medications can be used for longer periods to achieve desired clinical outcomes.  4.All patients taking weight loss medications must be seen in clinic for refill authorization.    Counseling exercise ( V65.41) - start a regular exercise program  Dietary counseling( V65.3) - Recommend 4879-1426 gali/day  Calculated BMI above the upper parameter and f/u plan was documented in the medical record.  Discussed indications, risks and benefits of all medications prescribed, and answered questions to patient's satisfaction.  Start Phentermine 37.5 mg, 1/2 tab q am, increase to 1 tab q am as tolerated  Short term weight loss goal 5% of her current weight in the next 3 months = 12 lbs or approximately 4 lbs per month.    Labs ordered today:   Orders Placed This Encounter   Procedures     Hemoglobin A1c     Comprehensive metabolic panel     TSH with free T4 reflex     EKG 12-lead complete w/read - Clinics     Radiology/Consults ordered today: None    More than 50% of the time spent with Ms. Gaviria on counseling / coordinating her care.  Total face to face time was greater than or equal to 45 minutes.      Follow-up:  follow  up in 1 month(s)    Marga Nichole NP  Endocrinology  [facility]  CC: Jackelin Parker   ____________________________________________________________          Again, thank you for allowing me to participate in the care of your patient.        Sincerely,        JOSEFINA Adler CNP

## 2018-02-06 NOTE — MR AVS SNAPSHOT
After Visit Summary   2/6/2018    Pam Gaviria    MRN: [id number]           Patient Information     Date Of Birth          1970        Visit Information        Provider Department      2/6/2018 9:00 AM Marga Nichole APRN CNP [facility]        Today's Diagnoses     CARDIOVASCULAR SCREENING; LDL GOAL LESS THAN 160    -  1    Morbid obesity (H)        BMI 45.0-49.9, adult (H)          Care Instructions        Start Phentermine 37.5 mg, 1/2 tablet daily the first week then increase to one tab daily.  Try to take the phentermine no later than 2 pm or it might interfere with sleep    I recommend [address] calories per day.  My Fitness Pal is a good program for tracking your daily caloric intake.    Short term weight loss goal is 5% of your current weight in the next 3 months = 12 lbs in 3 months or about 4 lbs per month.    Additional resources:  [place] Move Program - handouts  Eating Well.com - recipes  Hungry Girl.com - recipes and other weight loss resources.  Calorie [email or web address]    Follow up in 1 month.          Follow-ups after your visit        Future tests that were ordered for you today     Open Future Orders        Priority Expected Expires Ordered    Cortisol Saliva Routine  2/6/2019 2/6/2018            Who to contact     If you have questions or need follow up information about today's clinic visit or your schedule please contact [facility] directly at [phone].  Normal or non-critical lab and imaging results will be communicated to you by MyChart, letter or phone within 4 business days after the clinic has received the results. If you do not hear from us within 7 days, please contact the clinic through MyChart or phone. If you have a critical or abnormal lab result, we will notify you by phone as soon as possible.  Submit refill requests through [employer] or call your pharmacy and they will forward the refill request to us. Please allow 3 business  "days for your refill to be completed.          Additional Information About Your Visit        MyChart Information     [facility] gives you secure access to your electronic health record. If you see a primary care provider, you can also send messages to your care team and make appointments. If you have questions, please call your primary care clinic.  If you do not have a primary care provider, please call [phone] and they will assist you.        Care EveryWhere ID     This is your Care EveryWhere ID. This could be used by other organizations to access your [place] medical records  [id number]        Your Vitals Were     Pulse Temperature Height Breastfeeding? BMI (Body Mass Index)       80 98.6  F (37  C) (Oral) 5' 1.75\" (1.568 m) No 47.13 kg/m2        Blood Pressure from Last 3 Encounters:   02/06/18 102/70   06/13/17 120/72   06/13/17 120/72    Weight from Last 3 Encounters:   02/06/18 255 lb 9.6 oz (115.9 kg)   06/13/17 254 lb 8 oz (115.4 kg)   06/13/17 255 lb 6.4 oz (115.8 kg)              We Performed the Following     Comprehensive metabolic panel     EKG 12-lead complete w/read - Clinics     Hemoglobin A1c     TSH with free T4 reflex          Today's Medication Changes          These changes are accurate as of 2/6/18  9:59 AM.  If you have any questions, ask your nurse or doctor.               Start taking these medicines.        Dose/Directions    phentermine 37.5 MG tablet   Commonly known as:  ADIPEX-P   Used for:  BMI 45.0-49.9, adult (H), Morbid obesity (H)   Started by:  Marga Nichole APRN CNP        Dose:  37.5 mg   Take 1 tablet (37.5 mg) by mouth every morning (before breakfast)   Quantity:  32 tablet   Refills:  0            Where to get your medicines      Some of these will need a paper prescription and others can be bought over the counter.  Ask your nurse if you have questions.     Bring a paper prescription for each of these medications     phentermine 37.5 MG tablet                " Primary Care Provider Office Phone # Fax #    Jackelin Parker, JOSEFINA STEVE [phone] [address] [place] [address]        Equal Access to Services     VANIA PATTON : Hadii timothy garg ryanno Sobarbiali, waaxda luqadaha, qaybta kaalmada adeluz elenada, yi lindo david aquino. So [facility] [phone].    ATENCIÓN: Si habla español, tiene a pedraza disposición servicios gratuitos de asistencia lingüística. Llame al [phone].    We comply with applicable federal civil rights laws and [place] laws. We do not discriminate on the basis of race, color, national origin, age, disability, sex, sexual orientation, or gender identity.            Thank you!     Thank you for choosing [facility]  for your care. Our goal is always to provide you with excellent care. Hearing back from our patients is one way we can continue to improve our services. Please take a few minutes to complete the written survey that you may receive in the mail after your visit with us. Thank you!             Your Updated Medication List - Protect others around you: Learn how to safely use, store and throw away your medicines at www.disposemymeds.org.          This list is accurate as of 2/6/18  9:59 AM.  Always use your most recent med list.                   Brand Name Dispense Instructions for use Diagnosis    albuterol 108 (90 BASE) MCG/ACT Inhaler    PROAIR HFA/PROVENTIL HFA/VENTOLIN HFA    1 Inhaler    Inhale 2 puffs into the lungs every 6 hours as needed for shortness of breath / dyspnea or wheezing    Moderate persistent asthma with acute exacerbation       azelastine 0.1 % spray    ASTELIN    1 Bottle    Spray 1-2 sprays into both nostrils 2 times daily    Moderate persistent asthma with acute exacerbation       etonogestrel 68 MG Impl    IMPLANON/NEXPLANON    1 each    1 each (68 mg) by Subdermal route once for 1 dose    Nexplanon insertion       EYE DROPS ALLERGY RELIEF OP            fluticasone 50 MCG/ACT spray    FLONASE     Spray 2 sprays into both nostrils daily        fluticasone furoate 100 MCG/ACT Aepb inhalation powder    ARNUITY ELLIPTA    1 each    Inhale 1 puff into the lungs daily    Moderate persistent asthma without complication       Fluticasone-Salmeterol 55-14 MCG/ACT Aepb           montelukast 10 MG tablet    SINGULAIR    90 tablet    Take 1 tablet (10 mg) by mouth At Bedtime    Chronic rhinitis       phentermine 37.5 MG tablet    ADIPEX-P    32 tablet    Take 1 tablet (37.5 mg) by mouth every morning (before breakfast)    BMI 45.0-49.9, adult (H), Morbid obesity (H)

## 2018-02-06 NOTE — PATIENT INSTRUCTIONS
Start Phentermine 37.5 mg, 1/2 tablet daily the first week then increase to one tab daily.  Try to take the phentermine no later than 2 pm or it might interfere with sleep    I recommend [address] calories per day.  My Fitness Pal is a good program for tracking your daily caloric intake.    Short term weight loss goal is 5% of your current weight in the next 3 months = 12 lbs in 3 months or about 4 lbs per month.    Additional resources:  [facility] Move Program - handouts  Eating Well.com - recipes  Hungry Girl.com - recipes and other weight loss resources.  Coupmon.com    Follow up in 1 month.

## 2018-02-07 LAB
ALBUMIN SERPL-MCNC: 3.7 G/DL (ref 3.4–5)
ALP SERPL-CCNC: 67 U/L (ref 40–150)
ALT SERPL W P-5'-P-CCNC: 22 U/L (ref 0–50)
ANION GAP SERPL CALCULATED.3IONS-SCNC: 7 MMOL/L (ref 3–14)
AST SERPL W P-5'-P-CCNC: 18 U/L (ref 0–45)
BILIRUB SERPL-MCNC: 0.4 MG/DL (ref 0.2–1.3)
BUN SERPL-MCNC: 10 MG/DL (ref 7–30)
CALCIUM SERPL-MCNC: 8.7 MG/DL (ref 8.5–10.1)
CHLORIDE SERPL-SCNC: 107 MMOL/L (ref 94–109)
CO2 SERPL-SCNC: 26 MMOL/L (ref 20–32)
CREAT SERPL-MCNC: 0.9 MG/DL (ref 0.52–1.04)
GFR SERPL CREATININE-BSD FRML MDRD: 67 ML/MIN/1.7M2
GLUCOSE SERPL-MCNC: 78 MG/DL (ref 70–99)
POTASSIUM SERPL-SCNC: 4.5 MMOL/L (ref 3.4–5.3)
PROT SERPL-MCNC: 7.3 G/DL (ref 6.8–8.8)
SODIUM SERPL-SCNC: 140 MMOL/L (ref 133–144)
TSH SERPL DL<=0.005 MIU/L-ACNC: 2.79 MU/L (ref 0.4–4)

## 2018-02-15 DIAGNOSIS — E66.01 MORBID OBESITY (H): ICD-10-CM

## 2018-02-20 LAB
COLLECT TME SPEC: 2300
CORTIS SAL-MCNC: 0.03 UG/DL

## 2018-02-21 NOTE — PROGRESS NOTES
Pam,  Your salivary cortisol was normal.  Here's a copy for your records.  Marga Nichole NP  Endocrinology

## 2018-03-08 ENCOUNTER — OFFICE VISIT (OUTPATIENT)
Dept: ENDOCRINOLOGY | Facility: CLINIC | Age: 48
End: 2018-03-08
Payer: COMMERCIAL

## 2018-03-08 VITALS
RESPIRATION RATE: 14 BRPM | HEART RATE: 96 BPM | WEIGHT: 243 LBS | TEMPERATURE: 97.9 F | HEIGHT: 62 IN | SYSTOLIC BLOOD PRESSURE: 104 MMHG | DIASTOLIC BLOOD PRESSURE: 78 MMHG | BODY MASS INDEX: 44.72 KG/M2

## 2018-03-08 DIAGNOSIS — E66.01 MORBID OBESITY (H): ICD-10-CM

## 2018-03-08 PROCEDURE — 99213 OFFICE O/P EST LOW 20 MIN: CPT | Performed by: CLINICAL NURSE SPECIALIST

## 2018-03-08 RX ORDER — PHENTERMINE HYDROCHLORIDE 37.5 MG/1
37.5 TABLET ORAL
Qty: 32 TABLET | Refills: 2 | Status: SHIPPED | OUTPATIENT
Start: 2018-03-08 | End: 2018-06-12

## 2018-03-08 NOTE — NURSING NOTE
"Chief Complaint   Patient presents with     RECHECK     Weight management        Initial /78 (BP Location: Left arm, Patient Position: Chair, Cuff Size: Adult Large)  Pulse 96  Temp 97.9  F (36.6  C) (Oral)  Resp 14  Ht 1.575 m (5' 2\")  Wt 110.2 kg (243 lb)  BMI 44.45 kg/m2 Estimated body mass index is 44.45 kg/(m^2) as calculated from the following:    Height as of this encounter: 1.575 m (5' 2\").    Weight as of this encounter: 110.2 kg (243 lb).  Medication Reconciliation: complete   Paulo Ramirez MA      "

## 2018-03-08 NOTE — MR AVS SNAPSHOT
After Visit Summary   3/8/2018    Pam Gaviria    MRN: [id number]           Patient Information     Date Of Birth          1970        Visit Information        Provider Department      3/8/2018 11:00 AM Marga Nichole APRN [facility]        Today's Diagnoses     BMI 45.0-49.9, adult (H)        Morbid obesity (H)          Care Instructions      Continue what your are doing - it is working great!  Continue Phentermine 37.5 mg once daily.    Same weight loss goals - Short term weight loss goal 5% of her current weight in the next 3 months = 12 lbs or approximately 4 lbs per month    Follow up in 3 months    Marga Nichole NP  Endocrinology            Follow-ups after your visit        Who to contact     If you have questions or need follow up information about today's clinic visit or your schedule please contact [facility] directly at [phone].  Normal or non-critical lab and imaging results will be communicated to you by MyChart, letter or phone within 4 business days after the clinic has received the results. If you do not hear from us within 7 days, please contact the clinic through [employer]hart or phone. If you have a critical or abnormal lab result, we will notify you by phone as soon as possible.  Submit refill requests through [employer] or call your pharmacy and they will forward the refill request to us. Please allow 3 business days for your refill to be completed.          Additional Information About Your Visit        MyChart Information     [employer] gives you secure access to your electronic health record. If you see a primary care provider, you can also send messages to your care team and make appointments. If you have questions, please call your primary care clinic.  If you do not have a primary care provider, please call [phone] and they will assist you.        Care EveryWhere ID     This is your Care EveryWhere ID. This could be used by  "other organizations to access your [place] medical records  [id number]        Your Vitals Were     Pulse Temperature Respirations Height BMI (Body Mass Index)       96 97.9  F (36.6  C) (Oral) 14 1.575 m (5' 2\") 44.45 kg/m2        Blood Pressure from Last 3 Encounters:   03/08/18 104/78   02/06/18 102/70   06/13/17 120/72    Weight from Last 3 Encounters:   03/08/18 110.2 kg (243 lb)   02/06/18 115.9 kg (255 lb 9.6 oz)   06/13/17 115.4 kg (254 lb 8 oz)              Today, you had the following     No orders found for display         Where to get your medicines      Some of these will need a paper prescription and others can be bought over the counter.  Ask your nurse if you have questions.     Bring a paper prescription for each of these medications     phentermine 37.5 MG tablet          Primary Care Provider Office Phone # Fax #    Jackelin Parker, JOSEFINA [facility] [phone] [address] [place] [address]        Equal Access to Services     [facility]: Hadii aad ku hadasho Soomaali, waaxda luqadaha, qaybta kaalmada adeigor, yi hernandez . So [facility] [phone].    ATENCIÓN: Si habla español, tiene a pedraza disposición servicios gratuitos de asistencia lingüística. Yohana al [phone].    We comply with applicable federal civil rights laws and [place] laws. We do not discriminate on the basis of race, color, national origin, age, disability, sex, sexual orientation, or gender identity.            Thank you!     Thank you for choosing [facility]  for your care. Our goal is always to provide you with excellent care. Hearing back from our patients is one way we can continue to improve our services. Please take a few minutes to complete the written survey that you may receive in the mail after your visit with us. Thank you!             Your Updated Medication List - Protect others around you: Learn how to safely use, store and throw " away your medicines at www.disposemymeds.org.          This list is accurate as of 3/8/18 11:20 AM.  Always use your most recent med list.                   Brand Name Dispense Instructions for use Diagnosis    albuterol 108 (90 BASE) MCG/ACT Inhaler    PROAIR HFA/PROVENTIL HFA/VENTOLIN HFA    1 Inhaler    Inhale 2 puffs into the lungs every 6 hours as needed for shortness of breath / dyspnea or wheezing    Moderate persistent asthma with acute exacerbation       azelastine 0.1 % spray    ASTELIN    1 Bottle    Spray 1-2 sprays into both nostrils 2 times daily    Moderate persistent asthma with acute exacerbation       etonogestrel 68 MG Impl    IMPLANON/NEXPLANON    1 each    1 each (68 mg) by Subdermal route once for 1 dose    Nexplanon insertion       EYE DROPS ALLERGY RELIEF OP           fluticasone 50 MCG/ACT spray    FLONASE     Spray 2 sprays into both nostrils daily        fluticasone furoate 100 MCG/ACT Aepb inhalation powder    ARNUITY ELLIPTA    1 each    Inhale 1 puff into the lungs daily    Moderate persistent asthma without complication       Fluticasone-Salmeterol 55-14 MCG/ACT Aepb           montelukast 10 MG tablet    SINGULAIR    90 tablet    Take 1 tablet (10 mg) by mouth At Bedtime    Chronic rhinitis       phentermine 37.5 MG tablet    ADIPEX-P    32 tablet    Take 1 tablet (37.5 mg) by mouth every morning (before breakfast)    BMI 45.0-49.9, adult (H), Morbid obesity (H)       * UNABLE TO FIND      MEDICATION NAME: Move Fee Ultra        * UNABLE TO FIND      MEDICATION NAME: Hair Skin and Nails supplement        * UNABLE TO FIND      MEDICATION NAME: Women's Ulta [employer] Energy and Metabolism supplement        * Notice:  This list has 3 medication(s) that are the same as other medications prescribed for you. Read the directions carefully, and ask your doctor or other care provider to review them with you.

## 2018-03-08 NOTE — LETTER
3/8/2018         RE: Pam Gaviria  [address]  [place] [address]        Dear Colleague,    Thank you for referring your patient, Pam Gaviria, to the [facility]. Please see a copy of my visit note below.    Name: Pam Gaviria  F/u for obesity (Last seen 2/6/2018).  HPI:  Pam Gaviria is a 47 year old female who presents for the evaluation of obesity.  Tried Nutra system - lost 40 lbs but gained it back.  Worked with a  for 2 years - lost inches but not as much weight.  Tried multiple different diets:  [place] Diet, low carb diets, etc.  One of her biggest challenges is meal planning - her boys compete in [employer] and they travel nearly every weekend - eats most meals at fast food restaurants.  Interested in weight loss medication.  Started Phentermine 37.5 mg qd one month ago.  She is tolerating the Phentermine well, no adverse effects.    Menses: on etonogestrel so no menses, prior menses were regular  Diarrhea/Constipation:No  Changes in Hair or Skin:No  Diabetes:No  Sleep:  Sleep Apnea/Snores:Yes: snores but not all the time  Hypertension or CAD:No  Hyperlipidemia:Yes: elevated triglycerides  With low HDL  Hirsutism:No  Easy Bruising:No  Use of Steroids:No oral steroids, uses inhaler for asthma + nasal steroids for allergies  Family history of Obesity:Yes: father's side of the family  Diet:   Exercise:Yes: member of the [employer] but hasn't been going - too cold  PMH/PSH:  History reviewed. No pertinent past medical history.  History reviewed. No pertinent surgical history.  Family Hx:  Family History   Problem Relation Age of Onset     Thyroid Disease Mother      Heart Defect Mother      mitral valve prolapse     Thyroid disease: Yes: mother with hypothyroidism         DM2: No         Autoimmune: DM1, SLE, RA, Vitiligo No    Social Hx:  Social History     Social History     Marital status:      Spouse name: N/A     Number of children: N/A     Years  "of education: N/A     Occupational History     Not on file.     Social History Main Topics     Smoking status: Never Smoker     Smokeless tobacco: Never Used     Alcohol use Yes      Comment: 1 time evry 3 months, 1 per time     Drug use: No     Sexual activity: Yes     Partners: Male     Birth control/ protection: Inserts/Ring     Other Topics Concern     Not on file     Social History Narrative          MEDICATIONS:  has a current medication list which includes the following prescription(s): UNABLE TO FIND, UNABLE TO FIND, UNABLE TO FIND, phentermine, fluticasone furoate, azelastine, fluticasone, montelukast, albuterol, tetrahydrozoline-zn sulfate, fluticasone-salmeterol, and etonogestrel.    ROS   ROS: 10 point ROS neg other than the symptoms noted above in the HPI.    GENERAL: no weight gain, fevers, chills, malaise, night sweats.   HEENT: no dysphagia, odynophagia, diplopia, neck pain or tenderness  CV: no chest pain, pressure, palpitations, skipped beats, LOC  LUNGS: no SOB, CASTRO, cough, sputum production, wheezing   ABDOMEN: no diarrhea, constipation, abdominal pain  EXTREMITIES: no rashes, ulcers, edema  NEUROLOGY: no changes in vision, tingling or numbness in hands or feet.   MSK: no muscle aches or pains, weakness  SKIN: no rashes or lesions  ENDOCRINE: no heat or cold intolerance      Physical Exam   VS: /78 (BP Location: Left arm, Patient Position: Chair, Cuff Size: Adult Large)  Pulse 96  Temp 97.9  F (36.6  C) (Oral)  Resp 14  Ht 1.575 m (5' 2\")  Wt 110.2 kg (243 lb)  BMI 44.45 kg/m2  GENERAL: NAD, well dressed, answering questions appropriately, appears stated age.  HEENT: OP clear, no exophthalmos, no proptosis, EOMI, no lig lag, no retraction, no scleral icterus  RESPIRATORY: Clear. Normal respiratory effort.  CARDIOVASCULAR: RRR.  No peripheral edema.  EXTREMITIES: no edema, +pulses, no rashes, no lesions  NEUROLOGY: CN grossly intact, no tremors  MSK: grossly intact, No digital " "cyanosis. Normal gait and station.  SKIN: no rashes, no lesions, no ulcers  PSYCH: Intact judgment and insight. A&OX3 with a cordial affect.    LABS:   !COMPREHENSIVE Latest Ref Rng & Units 2/6/2018   SODIUM 133 - 144 mmol/L 140   POTASSIUM 3.4 - 5.3 mmol/L 4.5   CHLORIDE 94 - 109 mmol/L 107   BUN 7 - 30 mg/dL 10   Creatinine 0.52 - 1.04 mg/dL 0.90   Glucose 70 - 99 mg/dL 78   ANION GAP 3 - 14 mmol/L 7   CALCIUM 8.5 - 10.1 mg/dL 8.7     Component    Latest Ref Rng & Units 5/15/2017   Cholesterol    <200 mg/dL 195   Triglycerides    <150 mg/dL 239 (H)   HDL Cholesterol    >49 mg/dL 45 (L)   LDL Cholesterol Calculated    <100 mg/dL 102 (H)   Non HDL Cholesterol    <130 mg/dL 150 (H)     !LIPID/HEPATIC Latest Ref Rng & Units 2/6/2018   AST 0 - 45 U/L 18   ALT 0 - 50 U/L 22     !THYROID Latest Ref Rng & Units 2/6/2018   TSH 0.40 - 4.00 mU/L 2.79     Vital Signs 2/6/2018 3/8/2018   Weight (LB) 255 lb 9.6 oz 243 lb   Height 5' 1.75\" 5' 2\"   BMI (Calculated) 47.23 44.54      Waist Circumference:  49\" (2/6/2018).     All pertinent notes, labs, and images personally reviewed by me.     A/P  Ms.Vanessa Gaviria is a 47 year old here for the evaluation of obesity:    1. Morbid Obesity-  BMI 47.23-->44.54. Comorbidities - hyperlipidemia.  Obesity is associated with a significant increase in mortality and risk of many disorders, including diabetes mellitus, hypertension, dyslipidemia, heart disease, stroke, sleep apnea, cancer, and many others. Conversely, weight loss is associated with a reduction in obesity-associated morbidity.    Endocrine evaluation of obesity includes Diabetes/prediabetes, Cushing's and thyroid dysfunction.  The relevant work up for the diagnosis and management of obesity and various treatment options were discussed. Body Mass Index (BMI) has been a standard means for calculating risk for overweight and obesity. The new American Association of Clinical Endocrinology (AACE) algorithm recommends lifestyle " modifications as the initial phase of treatment for all patients with the BMI equal or greater than 25 kg/m2. Lifestyle modifications includes use of medical nutrition therapy, exercise, tobacco cessation, adequate quality and quantity of sleep, limited consumption of alcohol and reduced stress through implementation of a structured, multidisciplinary program.    In patients with complications associated with obesity, graded interventions are recommended including pharmacological therapy such as phentermine, orlistat, lorcaserin and phentermine/topiramate ER, contrave ( buproprion/naltreone) and the use of very low calorie meal replacement programs.    If medical intervention is insufficient, surgical therapy may be considered, especially in patients with BMI greater than or equal to 35 kg/m2 with multiple complications. Surgical treatments include lap-band, gastric sleeve or gastric bypass surgery.    I informed the pt that:  1.Weight loss medications can be taken to assist with weight reduction when combined with appropriate healthy lifestyle changes.  2.I discussed possible s/e, risks and benefits of weight loss medications.  3.All medications are FDA approved, however, some may be used ''off label'' for their weight loss benefits and some ''short term'' medications can be used for longer periods to achieve desired clinical outcomes.  4.All patients taking weight loss medications must be seen in clinic for refill authorization.    Counseling exercise ( V65.41) - start a regular exercise program  Dietary counseling( V65.3) - Recommend 6282-0981 gali/day - has been eating 1440 gali/day.  Calculated BMI above the upper parameter and f/u plan was documented in the medical record.  Discussed indications, risks and benefits of all medications prescribed, and answered questions to patient's satisfaction.  Started Phentermine 37.5 mg 2/6/2018.  Has lost 12 lbs in the past month since starting Phentermine + diet and  exercise efforts.255-->243 lbs  Continue Phentermine 37.5 mg qd + diet efforts.  Short term weight loss goal 5% of her current weight in the next 3 months = 12 lbs or approximately 4 lbs per month.    Labs ordered today:   No orders of the defined types were placed in this encounter.    Radiology/Consults ordered today: None    More than 50% of the time spent with Ms. Gaviria on counseling / coordinating her care.  Total face to face time was greater than or equal to 20 minutes.      Follow-up:  follow up in 3 month(s)    Marga Nichole NP  Endocrinology  [facility]  CC: Jackelin Parker   ____________________________________________________________        Again, thank you for allowing me to participate in the care of your patient.        Sincerely,        JOSEFINA Adler CNP

## 2018-03-08 NOTE — PATIENT INSTRUCTIONS
Continue what your are doing - it is working great!  Continue Phentermine 37.5 mg once daily.    Same weight loss goals - Short term weight loss goal 5% of her current weight in the next 3 months = 12 lbs or approximately 4 lbs per month    Follow up in 3 months    Marga Nichole NP  Endocrinology

## 2018-03-08 NOTE — PROGRESS NOTES
Name: Pam Gaviria  F/u for obesity (Last seen 2/6/2018).  HPI:  Pam Gaviria is a 47 year old female who presents for the evaluation of obesity.  Tried Nutra system - lost 40 lbs but gained it back.  Worked with a  for 2 years - lost inches but not as much weight.  Tried multiple different diets:  [place] Diet, low carb diets, etc.  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  Interested in weight loss medication.  Started Phentermine 37.5 mg qd one month ago.  She is tolerating the Phentermine well, no adverse effects.    Menses: on etonogestrel so no menses, prior menses were regular  Diarrhea/Constipation:No  Changes in Hair or Skin:No  Diabetes:No  Sleep:  Sleep Apnea/Snores:Yes: snores but not all the time  Hypertension or CAD:No  Hyperlipidemia:Yes: elevated triglycerides  With low HDL  Hirsutism:No  Easy Bruising:No  Use of Steroids:No oral steroids, uses inhaler for asthma + nasal steroids for allergies  Family history of Obesity:Yes: father's side of the family  Diet:   Exercise:Yes: member of the [employer] but hasn't been going - too cold  PMH/PSH:  History reviewed. No pertinent past medical history.  History reviewed. No pertinent surgical history.  Family Hx:  Family History   Problem Relation Age of Onset     Thyroid Disease Mother      Heart Defect Mother      mitral valve prolapse     Thyroid disease: Yes: mother with hypothyroidism         DM2: No         Autoimmune: DM1, SLE, RA, Vitiligo No    Social Hx:  Social History     Social History     Marital status:      Spouse name: N/A     Number of children: N/A     Years of education: N/A     Occupational History     Not on file.     Social History Main Topics     Smoking status: Never Smoker     Smokeless tobacco: Never Used     Alcohol use Yes      Comment: 1 time evry 3 months, 1 per time     Drug use: No  "    Sexual activity: Yes     Partners: Male     Birth control/ protection: Inserts/Ring     Other Topics Concern     Not on file     Social History Narrative          MEDICATIONS:  has a current medication list which includes the following prescription(s): UNABLE TO FIND, UNABLE TO FIND, UNABLE TO FIND, phentermine, fluticasone furoate, azelastine, fluticasone, montelukast, albuterol, tetrahydrozoline-zn sulfate, fluticasone-salmeterol, and etonogestrel.    ROS   ROS: 10 point ROS neg other than the symptoms noted above in the HPI.    GENERAL: no weight gain, fevers, chills, malaise, night sweats.   HEENT: no dysphagia, odynophagia, diplopia, neck pain or tenderness  CV: no chest pain, pressure, palpitations, skipped beats, LOC  LUNGS: no SOB, CASTRO, cough, sputum production, wheezing   ABDOMEN: no diarrhea, constipation, abdominal pain  EXTREMITIES: no rashes, ulcers, edema  NEUROLOGY: no changes in vision, tingling or numbness in hands or feet.   MSK: no muscle aches or pains, weakness  SKIN: no rashes or lesions  ENDOCRINE: no heat or cold intolerance      Physical Exam   VS: /78 (BP Location: Left arm, Patient Position: Chair, Cuff Size: Adult Large)  Pulse 96  Temp 97.9  F (36.6  C) (Oral)  Resp 14  Ht 1.575 m (5' 2\")  Wt 110.2 kg (243 lb)  BMI 44.45 kg/m2  GENERAL: NAD, well dressed, answering questions appropriately, appears stated age.  HEENT: OP clear, no exophthalmos, no proptosis, EOMI, no lig lag, no retraction, no scleral icterus  RESPIRATORY: Clear. Normal respiratory effort.  CARDIOVASCULAR: RRR.  No peripheral edema.  EXTREMITIES: no edema, +pulses, no rashes, no lesions  NEUROLOGY: CN grossly intact, no tremors  MSK: grossly intact, No digital cyanosis. Normal gait and station.  SKIN: no rashes, no lesions, no ulcers  PSYCH: Intact judgment and insight. A&OX3 with a cordial affect.    LABS:   !COMPREHENSIVE Latest Ref Rng & Units 2/6/2018   SODIUM 133 - 144 mmol/L 140   POTASSIUM 3.4 - 5.3 " "mmol/L 4.5   CHLORIDE 94 - 109 mmol/L 107   BUN 7 - 30 mg/dL 10   Creatinine 0.52 - 1.04 mg/dL 0.90   Glucose 70 - 99 mg/dL 78   ANION GAP 3 - 14 mmol/L 7   CALCIUM 8.5 - 10.1 mg/dL 8.7     Component    Latest Ref Rng & Units 5/15/2017   Cholesterol    <200 mg/dL 195   Triglycerides    <150 mg/dL 239 (H)   HDL Cholesterol    >49 mg/dL 45 (L)   LDL Cholesterol Calculated    <100 mg/dL 102 (H)   Non HDL Cholesterol    <130 mg/dL 150 (H)     !LIPID/HEPATIC Latest Ref Rng & Units 2/6/2018   AST 0 - 45 U/L 18   ALT 0 - 50 U/L 22     !THYROID Latest Ref Rng & Units 2/6/2018   TSH 0.40 - 4.00 mU/L 2.79     Vital Signs 2/6/2018 3/8/2018   Weight (LB) 255 lb 9.6 oz 243 lb   Height 5' 1.75\" 5' 2\"   BMI (Calculated) 47.23 44.54      Waist Circumference:  49\" (2/6/2018).     All pertinent notes, labs, and images personally reviewed by me.     A/P  Ms.Vanessa Gaviria is a 47 year old here for the evaluation of obesity:    1. Morbid Obesity-  BMI 47.23-->44.54. Comorbidities - hyperlipidemia.  Obesity is associated with a significant increase in mortality and risk of many disorders, including diabetes mellitus, hypertension, dyslipidemia, heart disease, stroke, sleep apnea, cancer, and many others. Conversely, weight loss is associated with a reduction in obesity-associated morbidity.    Endocrine evaluation of obesity includes Diabetes/prediabetes, Cushing's and thyroid dysfunction.  The relevant work up for the diagnosis and management of obesity and various treatment options were discussed. Body Mass Index (BMI) has been a standard means for calculating risk for overweight and obesity. The new American Association of Clinical Endocrinology (AACE) algorithm recommends lifestyle modifications as the initial phase of treatment for all patients with the BMI equal or greater than 25 kg/m2. Lifestyle modifications includes use of medical nutrition therapy, exercise, tobacco cessation, adequate quality and quantity of sleep, limited " consumption of alcohol and reduced stress through implementation of a structured, multidisciplinary program.    In patients with complications associated with obesity, graded interventions are recommended including pharmacological therapy such as phentermine, orlistat, lorcaserin and phentermine/topiramate ER, contrave ( buproprion/naltreone) and the use of very low calorie meal replacement programs.    If medical intervention is insufficient, surgical therapy may be considered, especially in patients with BMI greater than or equal to 35 kg/m2 with multiple complications. Surgical treatments include lap-band, gastric sleeve or gastric bypass surgery.    I informed the pt that:  1.Weight loss medications can be taken to assist with weight reduction when combined with appropriate healthy lifestyle changes.  2.I discussed possible s/e, risks and benefits of weight loss medications.  3.All medications are FDA approved, however, some may be used ''off label'' for their weight loss benefits and some ''short term'' medications can be used for longer periods to achieve desired clinical outcomes.  4.All patients taking weight loss medications must be seen in clinic for refill authorization.    Counseling exercise ( V65.41) - start a regular exercise program  Dietary counseling( V65.3) - Recommend [id number] gali/day - has been eating 1440 gali/day.  Calculated BMI above the upper parameter and f/u plan was documented in the medical record.  Discussed indications, risks and benefits of all medications prescribed, and answered questions to patient's satisfaction.  Started Phentermine 37.5 mg 2/6/2018.  Has lost 12 lbs in the past month since starting Phentermine + diet and exercise efforts.255-->243 lbs  Continue Phentermine 37.5 mg qd + diet efforts.  Short term weight loss goal 5% of her current weight in the next 3 months = 12 lbs or approximately 4 lbs per month.    Labs ordered today:   No orders of the defined types were  placed in this encounter.    Radiology/Consults ordered today: None    More than 50% of the time spent with Ms. Gaviria on counseling / coordinating her care.  Total face to face time was greater than or equal to 20 minutes.      Follow-up:  follow up in 3 month(s)    Marga Nichole NP  Endocrinology  [facility]  CC: Jackelin Parker   ____________________________________________________________

## 2018-03-29 NOTE — TELEPHONE ENCOUNTER
"Requested Prescriptions   Pending Prescriptions Disp Refills     Fluticasone-Salmeterol 55-14 MCG/ACT AEPB      Inhaled Steroids Protocol Failed    3/29/2018  3:58 PM       Failed - Asthma control assessment score within normal limits in last 6 months    Please review ACT score.          Failed - Recent (6 mo) or future (30 days) visit within the authorizing provider's specialty    Patient had office visit in the last 6 months or has a visit in the next 30 days with authorizing provider or within the authorizing provider's specialty.  See \"Patient Info\" tab in inbasket, or \"Choose Columns\" in Meds & Orders section of the refill encounter.           Passed - Patient is age 12 or older        Due for an updated ACT.  This is a reported medication. Please sign if ok.  Brooke Johnston RN  Triage Nurse  "

## 2018-04-02 NOTE — TELEPHONE ENCOUNTER
Called and left VM to call clinic asap.  Please see below - need medication info.  Michelle Fitzgerald, CMA

## 2018-04-05 RX ORDER — FLUTICASONE PROPIONATE AND SALMETEROL 55; 14 UG/1; UG/1
POWDER, METERED RESPIRATORY (INHALATION)
Status: CANCELLED | OUTPATIENT
Start: 2018-04-05

## 2018-04-05 NOTE — TELEPHONE ENCOUNTER
Letter mailed to patient requesting call re: med info and updated ACT.  Michelle Fitzgerald, CMA

## 2018-04-09 DIAGNOSIS — J30.9 CHRONIC ALLERGIC RHINITIS, UNSPECIFIED SEASONALITY, UNSPECIFIED TRIGGER: Primary | ICD-10-CM

## 2018-04-09 NOTE — TELEPHONE ENCOUNTER
"Requested Prescriptions   Pending Prescriptions Disp Refills     fluticasone (FLONASE) 50 MCG/ACT spray  Historical  Last Written Prescription Date:  na  Last Fill Quantity: na,  # refills: na   Last office visit: 6/13/2017 with prescribing provider:  Jackelin Parker APRN CNP    Future Office Visit:     1 Bottle      Sig: Spray 2 sprays into both nostrils daily    Inhaled Steroids Protocol Failed    4/9/2018  1:45 PM       Failed - Asthma control assessment score within normal limits in last 6 months    Please review ACT score.   ACT Total Scores 11/11/2015 5/12/2017 6/13/2017   ACT TOTAL SCORE - - -   ASTHMA ER VISITS - - -   ASTHMA HOSPITALIZATIONS - - -   ACT TOTAL SCORE (Goal Greater than or Equal to 20) 8 21 24   In the past 12 months, how many times did you visit the emergency room for your asthma without being admitted to the hospital? 0 0 0   In the past 12 months, how many times were you hospitalized overnight because of your asthma? 0 0 0            Failed - Recent (6 mo) or future (30 days) visit within the authorizing provider's specialty    Patient had office visit in the last 6 months or has a visit in the next 30 days with authorizing provider or within the authorizing provider's specialty.  See \"Patient Info\" tab in inbasket, or \"Choose Columns\" in Meds & Orders section of the refill encounter.           Passed - Patient is age 12 or older        "

## 2018-04-11 RX ORDER — FLUTICASONE PROPIONATE 50 MCG
2 SPRAY, SUSPENSION (ML) NASAL DAILY
Qty: 1 BOTTLE | Refills: 11 | Status: SHIPPED | OUTPATIENT
Start: 2018-04-11 | End: 2018-05-07

## 2018-04-11 NOTE — TELEPHONE ENCOUNTER
Routing refill request to provider for review/approval because:  Medication is reported/historical  Geovani Torres RN, BSN

## 2018-05-07 ENCOUNTER — OFFICE VISIT (OUTPATIENT)
Dept: PEDIATRICS | Facility: CLINIC | Age: 48
End: 2018-05-07
Payer: COMMERCIAL

## 2018-05-07 VITALS
SYSTOLIC BLOOD PRESSURE: 108 MMHG | HEIGHT: 62 IN | DIASTOLIC BLOOD PRESSURE: 60 MMHG | HEART RATE: 74 BPM | WEIGHT: 228.8 LBS | OXYGEN SATURATION: 98 % | TEMPERATURE: 97.2 F | BODY MASS INDEX: 42.1 KG/M2

## 2018-05-07 DIAGNOSIS — J45.40 MODERATE PERSISTENT ASTHMA WITHOUT COMPLICATION: Primary | ICD-10-CM

## 2018-05-07 DIAGNOSIS — J30.9 CHRONIC ALLERGIC RHINITIS, UNSPECIFIED SEASONALITY, UNSPECIFIED TRIGGER: ICD-10-CM

## 2018-05-07 DIAGNOSIS — M25.561 POSTERIOR RIGHT KNEE PAIN: ICD-10-CM

## 2018-05-07 DIAGNOSIS — R30.0 DYSURIA: ICD-10-CM

## 2018-05-07 LAB
ALBUMIN UR-MCNC: NEGATIVE MG/DL
APPEARANCE UR: CLEAR
BILIRUB UR QL STRIP: NEGATIVE
COLOR UR AUTO: YELLOW
GLUCOSE UR STRIP-MCNC: NEGATIVE MG/DL
HGB UR QL STRIP: ABNORMAL
KETONES UR STRIP-MCNC: NEGATIVE MG/DL
LEUKOCYTE ESTERASE UR QL STRIP: NEGATIVE
NITRATE UR QL: NEGATIVE
PH UR STRIP: 5.5 PH (ref 5–7)
RBC #/AREA URNS AUTO: NORMAL /HPF
SOURCE: ABNORMAL
SP GR UR STRIP: 1.01 (ref 1–1.03)
UROBILINOGEN UR STRIP-ACNC: 0.2 EU/DL (ref 0.2–1)
WBC #/AREA URNS AUTO: NORMAL /HPF

## 2018-05-07 PROCEDURE — 99214 OFFICE O/P EST MOD 30 MIN: CPT | Performed by: NURSE PRACTITIONER

## 2018-05-07 PROCEDURE — 81001 URINALYSIS AUTO W/SCOPE: CPT | Performed by: NURSE PRACTITIONER

## 2018-05-07 RX ORDER — CETIRIZINE HYDROCHLORIDE 10 MG/1
10 TABLET ORAL DAILY
Qty: 30 TABLET | Status: CANCELLED | OUTPATIENT
Start: 2018-05-07

## 2018-05-07 RX ORDER — MONTELUKAST SODIUM 10 MG/1
10 TABLET ORAL AT BEDTIME
Qty: 90 TABLET | Refills: 3 | Status: SHIPPED | OUTPATIENT
Start: 2018-05-07 | End: 2019-06-07

## 2018-05-07 RX ORDER — FLUTICASONE PROPIONATE 50 MCG
2 SPRAY, SUSPENSION (ML) NASAL DAILY
Qty: 1 BOTTLE | Refills: 11 | Status: SHIPPED | OUTPATIENT
Start: 2018-05-07 | End: 2020-02-18

## 2018-05-07 RX ORDER — ALBUTEROL SULFATE 90 UG/1
2 AEROSOL, METERED RESPIRATORY (INHALATION) EVERY 6 HOURS PRN
Qty: 1 INHALER | Refills: 11 | Status: SHIPPED | OUTPATIENT
Start: 2018-05-07 | End: 2020-02-18

## 2018-05-07 RX ORDER — BUDESONIDE AND FORMOTEROL FUMARATE DIHYDRATE 160; 4.5 UG/1; UG/1
2 AEROSOL RESPIRATORY (INHALATION) 2 TIMES DAILY
Qty: 1 INHALER | Refills: 11 | Status: SHIPPED | OUTPATIENT
Start: 2018-05-07 | End: 2019-06-07

## 2018-05-07 RX ORDER — CETIRIZINE HYDROCHLORIDE 10 MG/1
10 TABLET ORAL DAILY
COMMUNITY

## 2018-05-07 RX ORDER — AZELASTINE 1 MG/ML
1-2 SPRAY, METERED NASAL 2 TIMES DAILY
Qty: 1 BOTTLE | Refills: 2 | Status: SHIPPED | OUTPATIENT
Start: 2018-05-07 | End: 2018-10-31

## 2018-05-07 NOTE — MR AVS SNAPSHOT
After Visit Summary   5/7/2018    Pam Gaviria    MRN: [id number]           Patient Information     Date Of Birth          1970        Visit Information        Provider Department      5/7/2018 9:40 AM Jackelin Parker APRN [facility]        Today's Diagnoses     Moderate persistent asthma without complication    -  1    Chronic allergic rhinitis, unspecified seasonality, unspecified trigger        Posterior right knee pain        Dysuria          Care Instructions    -Call me if the urinary sx continue          Follow-ups after your visit        Additional Services     ORTHO  REFERRAL       [facility] is referring you to the Orthopedic  Services at [facility].       The  Representative will assist you in the coordination of your Orthopedic and Musculoskeletal Care as prescribed by your physician.    The  Representative will call you within 1 business day to help schedule your appointment, or you may contact the  Representative at:    All areas ~ [phone]     Type of Referral : Non Surgical       Timeframe requested: 3 - 5 days    Coverage of these services is subject to the terms and limitations of your health insurance plan.  Please call member services at your health plan with any benefit or coverage questions.      If X-rays, CT or MRI's have been performed, please contact the facility where they were done to arrange for , prior to your scheduled appointment.  Please bring this referral request to your appointment and present it to your specialist.                  Your next 10 appointments already scheduled     Jun 06, 2018   Procedure with Maru Nguyen MD   [facility] (--)    [address]  [facility] [address] [phone]              Who to contact     If you have questions or need follow up information about today's  "clinic visit or your schedule please contact [facility] [place] directly at [phone].  Normal or non-critical lab and imaging results will be communicated to you by MyChart, letter or phone within 4 business days after the clinic has received the results. If you do not hear from us within 7 days, please contact the clinic through [employer] or phone. If you have a critical or abnormal lab result, we will notify you by phone as soon as possible.  Submit refill requests through [facility] or call your pharmacy and they will forward the refill request to us. Please allow 3 business days for your refill to be completed.          Additional Information About Your Visit        [employer] Information     [facility] gives you secure access to your electronic health record. If you see a primary care provider, you can also send messages to your care team and make appointments. If you have questions, please call your primary care clinic.  If you do not have a primary care provider, please call [phone] and they will assist you.        Care EveryWhere ID     This is your Care EveryWhere ID. This could be used by other organizations to access your [place] medical records  [id number]        Your Vitals Were     Pulse Temperature Height Pulse Oximetry BMI (Body Mass Index)       74 97.2  F (36.2  C) (Tympanic) 5' 2\" (1.575 m) 98% 41.85 kg/m2        Blood Pressure from Last 3 Encounters:   05/07/18 108/60   03/08/18 104/78   02/06/18 102/70    Weight from Last 3 Encounters:   05/07/18 228 lb 12.8 oz (103.8 kg)   03/08/18 243 lb (110.2 kg)   02/06/18 255 lb 9.6 oz (115.9 kg)              We Performed the Following     *UA reflex to Microscopic and Culture ([place] [facility] (except [place] and [place])     Asthma Action Plan (AAP)     ORTHO  REFERRAL     Urine Microscopic          Today's Medication Changes          These changes are accurate as of 5/7/18 10:16 AM.  If you have any questions, ask your nurse " or doctor.               Start taking these medicines.        Dose/Directions    budesonide-formoterol 160-4.5 MCG/ACT Inhaler   Commonly known as:  SYMBICORT   Started by:  Jackelin Parker APRN CNP        Dose:  2 puff   Inhale 2 puffs into the lungs 2 times daily   Quantity:  1 Inhaler   Refills:  11         Stop taking these medicines if you haven't already. Please contact your care team if you have questions.     Fluticasone-Salmeterol 55-14 MCG/ACT Aepb   Stopped by:  Jackelin Parker APRN CNP                Where to get your medicines      These medications were sent to [id number] - [place], [place] - [address]  AT [facility]  [address] [place] [address]     Phone:  [phone]     albuterol 108 (90 Base) MCG/ACT Inhaler    azelastine 0.1 % spray    budesonide-formoterol 160-4.5 MCG/ACT Inhaler    fluticasone 50 MCG/ACT spray    montelukast 10 MG tablet                Primary Care Provider Office Phone # Fax #    JOSEFINA Horne [phone]       [address] [place] [address]        Equal Access to Services     [facility] AH: Hadii aad ku hadasho Soomaali, waaxda luqadaha, qaybta kaalmada adeegyada, waxay idiin haykavitan kang aquino. So [facility] [phone].    ATENCIÓN: Si habla español, tiene a pedraza disposición servicios gratuitos de asistencia lingüística. Llame al [phone].    We comply with applicable federal civil rights laws and [place] laws. We do not discriminate on the basis of race, color, national origin, age, disability, sex, sexual orientation, or gender identity.            Thank you!     Thank you for choosing [facility]  for your care. Our goal is always to provide you with excellent care. Hearing back from our patients is one way we can continue to improve our services. Please take a few minutes to complete the written survey that you may receive in the mail  after your visit with us. Thank you!             Your Updated Medication List - Protect others around you: Learn how to safely use, store and throw away your medicines at www.disposemymeds.org.          This list is accurate as of 5/7/18 10:16 AM.  Always use your most recent med list.                   Brand Name Dispense Instructions for use Diagnosis    albuterol 108 (90 Base) MCG/ACT Inhaler    PROAIR HFA/PROVENTIL HFA/VENTOLIN HFA    1 Inhaler    Inhale 2 puffs into the lungs every 6 hours as needed for shortness of breath / dyspnea or wheezing        azelastine 0.1 % spray    ASTELIN    1 Bottle    Spray 1-2 sprays into both nostrils 2 times daily        budesonide-formoterol 160-4.5 MCG/ACT Inhaler    SYMBICORT    1 Inhaler    Inhale 2 puffs into the lungs 2 times daily        cetirizine 10 MG tablet    zyrTEC     Take 10 mg by mouth daily        etonogestrel 68 MG Impl    IMPLANON/NEXPLANON    1 each    1 each (68 mg) by Subdermal route once for 1 dose    Nexplanon insertion       EYE DROPS ALLERGY RELIEF OP           fluticasone 50 MCG/ACT spray    FLONASE    1 Bottle    Spray 2 sprays into both nostrils daily    Chronic allergic rhinitis, unspecified seasonality, unspecified trigger       fluticasone furoate 100 MCG/ACT Aepb inhalation powder    ARNUITY ELLIPTA    1 each    Inhale 1 puff into the lungs daily    Moderate persistent asthma without complication       montelukast 10 MG tablet    SINGULAIR    90 tablet    Take 1 tablet (10 mg) by mouth At Bedtime    Chronic allergic rhinitis, unspecified seasonality, unspecified trigger       phentermine 37.5 MG tablet    ADIPEX-P    32 tablet    Take 1 tablet (37.5 mg) by mouth every morning (before breakfast)    BMI 45.0-49.9, adult (H), Morbid obesity (H)       * UNABLE TO FIND      MEDICATION NAME: Move Fee Ultra        * UNABLE TO FIND      MEDICATION NAME: Hair Skin and Nails supplement        * UNABLE TO FIND      MEDICATION NAME: Women's Ulta Maurilio  Energy and Metabolism supplement        * Notice:  This list has 3 medication(s) that are the same as other medications prescribed for you. Read the directions carefully, and ask your doctor or other care provider to review them with you.

## 2018-05-07 NOTE — LETTER
My Asthma Action Plan  Name: Pam Gaviria   YOB: 1970  Date: 5/7/2018   My doctor: JOSEFINA Horne CNP   My clinic: [facility] [place]        My Control Medicine: Fluticasone furoate (Arnuity Ellipta) -  100 mcg .  My Rescue Medicine: Albuterol (Proair/Ventolin/Proventil) inhaler .   My Asthma Severity: moderate persistent  Avoid your asthma triggers: upper respiratory infections  None            GREEN ZONE   Good Control    I feel good    No cough or wheeze    Can work, sleep and play without asthma symptoms       Take your asthma control medicine every day.     1. If exercise triggers your asthma, take your rescue medication    15 minutes before exercise or sports, and    During exercise if you have asthma symptoms  2. Spacer to use with inhaler: If you have a spacer, make sure to use it with your inhaler             YELLOW ZONE Getting Worse  I have ANY of these:    I do not feel good    Cough or wheeze    Chest feels tight    Wake up at night   1. Keep taking your Green Zone medications  2. Start taking your rescue medicine:    every 20 minutes for up to 1 hour. Then every 4 hours for 24-48 hours.  3. If you stay in the Yellow Zone for more than 12-24 hours, contact your doctor.  4. If you do not return to the Green Zone in 12-24 hours or you get worse, start taking your oral steroid medicine if prescribed by your provider.           RED ZONE Medical Alert - Get Help  I have ANY of these:    I feel awful    Medicine is not helping    Breathing getting harder    Trouble walking or talking    Nose opens wide to breathe       1. Take your rescue medicine NOW  2. If your provider has prescribed an oral steroid medicine, start taking it NOW  3. Call your doctor NOW  4. If you are still in the Red Zone after 20 minutes and you have not reached your doctor:    Take your rescue medicine again and    Call 911 or go to the emergency room right away    See your regular doctor within 2  weeks of an Emergency Room or Urgent Care visit for follow-up treatment.          Annual Reminders:  Meet with Asthma Educator,  Flu Shot in the Fall, consider Pneumonia Vaccination for patients with asthma (aged 19 and older).    Pharmacy: [facility] [address] [facility] [address]  AT [address]                      Asthma Triggers  How To Control Things That Make Your Asthma Worse    Triggers are things that make your asthma worse.  Look at the list below to help you find your triggers and what you can do about them.  You can help prevent asthma flare-ups by staying away from your triggers.      Trigger                                                          What you can do   Cigarette Smoke  Tobacco smoke can make asthma worse. Do not allow smoking in your home, car or around you.  Be sure no one smokes at a child s day care or school.  If you smoke, ask your health care provider for ways to help you quit.  Ask family members to quit too.  Ask your health care provider for a referral to Quit Plan to help you quit smoking, or call 0-346-637-PLAN.     Colds, Flu, Bronchitis  These are common triggers of asthma. Wash your hands often.  Don t touch your eyes, nose or mouth.  Get a flu shot every year.     Dust Mites  These are tiny bugs that live in cloth or carpet. They are too small to see. Wash sheets and blankets in hot water every week.   Encase pillows and mattress in dust mite proof covers.  Avoid having carpet if you can. If you have carpet, vacuum weekly.   Use a dust mask and HEPA vacuum.   Pollen and Outdoor Mold  Some people are allergic to trees, grass, or weed pollen, or molds. Try to keep your windows closed.  Limit time out doors when pollen count is high.   Ask you health care provider about taking medicine during allergy season.     Animal Dander  Some people are allergic to skin flakes, urine or saliva from pets with fur or feathers. Keep pets with fur or  feathers out of your home.    If you can t keep the pet outdoors, then keep the pet out of your bedroom.  Keep the bedroom door closed.  Keep pets off cloth furniture and away from stuffed toys.     Mice, Rats, and Cockroaches  Some people are allergic to the waste from these pests.   Cover food and garbage.  Clean up spills and food crumbs.  Store grease in the refrigerator.   Keep food out of the bedroom.   Indoor Mold  This can be a trigger if your home has high moisture. Fix leaking faucets, pipes, or other sources of water.   Clean moldy surfaces.  Dehumidify basement if it is damp and smelly.   Smoke, Strong Odors, and Sprays  These can reduce air quality. Stay away from strong odors and sprays, such as perfume, powder, hair spray, paints, smoke incense, paint, cleaning products, candles and new carpet.   Exercise or Sports  Some people with asthma have this trigger. Be active!  Ask your doctor about taking medicine before sports or exercise to prevent symptoms.    Warm up for 5-10 minutes before and after sports or exercise.     Other Triggers of Asthma  Cold air:  Cover your nose and mouth with a scarf.  Sometimes laughing or crying can be a trigger.  Some medicines and food can trigger asthma.

## 2018-05-07 NOTE — PROGRESS NOTES
SUBJECTIVE:   Pam Gaviria is a 47 year old female who presents to clinic today for the following health issues:    Asthma Follow-Up    Was ACT completed today?    Yes    ACT Total Scores 5/7/2018   ACT TOTAL SCORE -   ASTHMA ER VISITS -   ASTHMA HOSPITALIZATIONS -   ACT TOTAL SCORE (Goal Greater than or Equal to 20) 20   In the past 12 months, how many times did you visit the emergency room for your asthma without being admitted to the hospital? 0   In the past 12 months, how many times were you hospitalized overnight because of your asthma? 0       Recent asthma triggers that patient is dealing with: pollens      Amount of exercise or physical activity: None    Problems taking medications regularly: Yes,  Ran out    Medication side effects: none    Diet: calories    Musculoskeletal problem/pain      Duration: 3 weeks    Description  Location: right leg - behind knee - radiating up and down back of leg    Intensity:  severe    Accompanying signs and symptoms: radiation of pain to upper and lower leg    History  Previous similar problem: no   Previous evaluation:  none    Precipitating or alleviating factors:  Trauma or overuse: no   Aggravating factors include: worse after rest or if there is pressure on back of leg - sitting, driving    Therapies tried and outcome: nothing    URINARY TRACT SYMPTOMS      Duration: last week - resolved now    Description  dysuria, frequency and urgency    Intensity:  moderate    Accompanying signs and symptoms:  Fever/chills: no   Flank pain no   Nausea and vomiting: no   Vaginal symptoms: none  Abdominal/Pelvic Pain: no     History  History of frequent UTI's: YES  History of kidney stones: no   Sexually Active: YES  Possibility of pregnancy: No    Precipitating or alleviating factors: None    Therapies tried and outcome: took Azo and symptoms went away   Outcome: symptoms resolved    Posterior knee started hurting the day or two after a flight to [place]. It hurts when she  "is sitting in a chair because the chair pushes on it. Also hurts if she bends her knee. She is fine standing or laying. Occasionally the pain radiates all the way up to her right buttock and sometimes down her right foot-sharp and shooting. Absolutely no increase in swelling of the leg, ankle or foot, no redness, heat, or bruising. No history of blood clots. No back, hip, knee, or ankle pain.    ROS: const/msk/neuro/heent/cv/resp otherwise negative     OBJECTIVE:  /60 (Cuff Size: Adult Large)  Pulse 74  Temp 97.2  F (36.2  C) (Tympanic)  Ht 5' 2\" (1.575 m)  Wt 228 lb 12.8 oz (103.8 kg)  SpO2 98%  BMI 41.85 kg/m2  CONSTITUTIONAL: Alert, well-nourished, well-groomed, NAD  RESP: Lungs CTA. No wheeze, rhonchi, rales.  CV: HRRR S1 S2 No MRG. No peripheral edema  MSK: Right leg without any skin changes such as heat or warmth. No swelling of the lower extremity whatsoever. Full ROM of knee but pain with flexion. TTP popliteal tendon. No mass of posterior fossa. Normal hip and LB exam.     ASSESSMENT/PLAN:  (J45.40) Moderate persistent asthma without complication  (primary encounter diagnosis)  Comment: Well controlled but out of meds.   Plan: Asthma Action Plan (AAP), Urine Microscopic        Meds refilled. F/U 1 year.     (J30.9) Chronic allergic rhinitis, unspecified seasonality, unspecified trigger  Comment: Well controlled.  Plan: montelukast (SINGULAIR) 10 MG tablet,         fluticasone (FLONASE) 50 MCG/ACT spray            (M25.561) Posterior right knee pain  Comment: Unclear etiology. See above. DVT was considered given location and plane flight. However, patient is low risk for DVT and has absolutely no swelling of the LE, redness, or warmth. She has pain over the popliteal tendon. Suspect tendonitis, LB radiculopathy, hamstring strain, or baker's cyst.   Plan: ORTHO  REFERRAL        Referred to ortho.        Discussed the possibility of DVT. If any swelling, redness, heat, etc she should " contact me to have  RLE US done. For any shortness of breath she should seek care in the ED.    (R30.0) Dysuria  Plan: *UA reflex to Microscopic and Culture ([place]         [facility] (except [place] and         [place])              Jackelin Parker, FNP-DNP.

## 2018-05-08 ASSESSMENT — ASTHMA QUESTIONNAIRES: ACT_TOTALSCORE: 20

## 2018-05-14 ENCOUNTER — HOSPITAL ENCOUNTER (OUTPATIENT)
Dept: ULTRASOUND IMAGING | Facility: CLINIC | Age: 48
Discharge: HOME | End: 2018-05-14
Attending: NURSE PRACTITIONER | Admitting: NURSE PRACTITIONER
Payer: COMMERCIAL

## 2018-05-14 ENCOUNTER — OFFICE VISIT (OUTPATIENT)
Dept: PEDIATRICS | Facility: CLINIC | Age: 48
End: 2018-05-14
Payer: COMMERCIAL

## 2018-05-14 VITALS
OXYGEN SATURATION: 97 % | BODY MASS INDEX: 41.7 KG/M2 | HEART RATE: 96 BPM | TEMPERATURE: 97.8 F | SYSTOLIC BLOOD PRESSURE: 106 MMHG | WEIGHT: 226.6 LBS | DIASTOLIC BLOOD PRESSURE: 58 MMHG | HEIGHT: 62 IN

## 2018-05-14 DIAGNOSIS — N62 LARGE BREASTS: ICD-10-CM

## 2018-05-14 DIAGNOSIS — M25.561 POSTERIOR RIGHT KNEE PAIN: ICD-10-CM

## 2018-05-14 DIAGNOSIS — Z01.818 PREOP GENERAL PHYSICAL EXAM: Primary | ICD-10-CM

## 2018-05-14 DIAGNOSIS — J45.40 MODERATE PERSISTENT ASTHMA WITHOUT COMPLICATION: ICD-10-CM

## 2018-05-14 PROCEDURE — 99215 OFFICE O/P EST HI 40 MIN: CPT | Performed by: NURSE PRACTITIONER

## 2018-05-14 PROCEDURE — 93971 EXTREMITY STUDY: CPT | Mod: RT

## 2018-05-14 PROCEDURE — 93000 ELECTROCARDIOGRAM COMPLETE: CPT | Performed by: NURSE PRACTITIONER

## 2018-05-14 NOTE — MR AVS SNAPSHOT
After Visit Summary   5/14/2018    aPm Gaviria    MRN: [id number]           Patient Information     Date Of Birth          1970        Visit Information        Provider Department      5/14/2018 10:00 AM Jackelin Parker APRN [facility]        Today's Diagnoses     Preop general physical exam    -  1    Large breasts        Moderate persistent asthma without complication        Posterior right knee pain          Care Instructions    -Take all meds day of surgery        Before Your Surgery      Call your surgeon if there is any change in your health. This includes signs of a cold or flu (such as a sore throat, runny nose, cough, rash or fever).    Do not smoke, drink alcohol or take over the counter medicine (unless your surgeon or primary care doctor tells you to) for the 24 hours before and after surgery.    If you take prescribed drugs: Follow your doctor s orders about which medicines to take and which to stop until after surgery.    Eating and drinking prior to surgery: follow the instructions from your surgeon    Take a shower or bath the night before surgery. Use the soap your surgeon gave you to gently clean your skin. If you do not have soap from your surgeon, use your regular soap. Do not shave or scrub the surgery site.  Wear clean pajamas and have clean sheets on your bed.           Follow-ups after your visit        Your next 10 appointments already scheduled     May 15, 2018 10:40 AM CDT   New Visit with Ermelinda Piña DO   [facility] ([facility])    [address]  [address]  [facility] [address]   [phone]            Jun 06, 2018   Procedure with Maru Nguyen MD   [facility] (--)    [address]  [facility] [id number] [phone]              Future tests that were ordered for you today     Open Future Orders        Priority Expected Expires Ordered     "US Lower Extremity Venous Duplex Right Routine  5/14/2019 5/14/2018            Who to contact     If you have questions or need follow up information about today's clinic visit or your schedule please contact [facility] [place] directly at [phone].  Normal or non-critical lab and imaging results will be communicated to you by MyChart, letter or phone within 4 business days after the clinic has received the results. If you do not hear from us within 7 days, please contact the clinic through [employer] or phone. If you have a critical or abnormal lab result, we will notify you by phone as soon as possible.  Submit refill requests through [employer] or call your pharmacy and they will forward the refill request to us. Please allow 3 business days for your refill to be completed.          Additional Information About Your Visit        [employer] Information     [employer] gives you secure access to your electronic health record. If you see a primary care provider, you can also send messages to your care team and make appointments. If you have questions, please call your primary care clinic.  If you do not have a primary care provider, please call [phone] and they will assist you.        Care EveryWhere ID     This is your Care EveryWhere ID. This could be used by other organizations to access your [place] medical records  [id number]        Your Vitals Were     Pulse Temperature Height Pulse Oximetry BMI (Body Mass Index)       96 97.8  F (36.6  C) (Tympanic) 5' 2\" (1.575 m) 97% 41.45 kg/m2        Blood Pressure from Last 3 Encounters:   05/14/18 106/58   05/07/18 108/60   03/08/18 104/78    Weight from Last 3 Encounters:   05/14/18 226 lb 9.6 oz (102.8 kg)   05/07/18 228 lb 12.8 oz (103.8 kg)   03/08/18 243 lb (110.2 kg)              We Performed the Following     CBC with platelets differential     EKG 12-lead complete w/read - Clinics          Today's Medication Changes          These changes are accurate as of " 5/14/18 10:27 AM.  If you have any questions, ask your nurse or doctor.               Stop taking these medicines if you haven't already. Please contact your care team if you have questions.     fluticasone furoate 100 MCG/ACT Aepb inhalation powder   Commonly known as:  ARNUITY ELLIPTA   Stopped by:  Jackelin Parker APRN CNP                    Primary Care Provider Office Phone # Fax #    JOSEFINA Horne [phone] [address] [place] [address]        Equal Access to Services     [facility]: Hadii aad ku hadasho Soomaali, waaxda luqadaha, qaybta kaalmada adeegyada, waxay idiin haykemal hernandez . So [facility] [phone].    ATENCIÓN: Si habla español, tiene a pedraza disposición servicios gratuitos de asistencia lingüística. Ll[facility] [phone].    We comply with applicable federal civil rights laws and [place] laws. We do not discriminate on the basis of race, color, national origin, age, disability, sex, sexual orientation, or gender identity.            Thank you!     Thank you for choosing [facility]  for your care. Our goal is always to provide you with excellent care. Hearing back from our patients is one way we can continue to improve our services. Please take a few minutes to complete the written survey that you may receive in the mail after your visit with us. Thank you!             Your Updated Medication List - Protect others around you: Learn how to safely use, store and throw away your medicines at www.disposemymeds.org.          This list is accurate as of 5/14/18 10:27 AM.  Always use your most recent med list.                   Brand Name Dispense Instructions for use Diagnosis    albuterol 108 (90 Base) MCG/ACT Inhaler    PROAIR HFA/PROVENTIL HFA/VENTOLIN HFA    1 Inhaler    Inhale 2 puffs into the lungs every 6 hours as needed for shortness of breath / dyspnea or wheezing        azelastine 0.1 % spray    ASTELIN    1  Bottle    Spray 1-2 sprays into both nostrils 2 times daily        budesonide-formoterol 160-4.5 MCG/ACT Inhaler    SYMBICORT    1 Inhaler    Inhale 2 puffs into the lungs 2 times daily        cetirizine 10 MG tablet    zyrTEC     Take 10 mg by mouth daily        etonogestrel 68 MG Impl    IMPLANON/NEXPLANON    1 each    1 each (68 mg) by Subdermal route once for 1 dose    Nexplanon insertion       EYE DROPS ALLERGY RELIEF OP           fluticasone 50 MCG/ACT spray    FLONASE    1 Bottle    Spray 2 sprays into both nostrils daily    Chronic allergic rhinitis, unspecified seasonality, unspecified trigger       montelukast 10 MG tablet    SINGULAIR    90 tablet    Take 1 tablet (10 mg) by mouth At Bedtime    Chronic allergic rhinitis, unspecified seasonality, unspecified trigger       phentermine 37.5 MG tablet    ADIPEX-P    32 tablet    Take 1 tablet (37.5 mg) by mouth every morning (before breakfast)    BMI 45.0-49.9, adult (H), Morbid obesity (H)       * UNABLE TO FIND      MEDICATION NAME: Move Fee Ultra        * UNABLE TO FIND      MEDICATION NAME: Hair Skin and Nails supplement        * UNABLE TO FIND      MEDICATION NAME: Women's Ulta [facility] Energy and Metabolism supplement        * Notice:  This list has 3 medication(s) that are the same as other medications prescribed for you. Read the directions carefully, and ask your doctor or other care provider to review them with you.

## 2018-05-14 NOTE — PATIENT INSTRUCTIONS
-Take all meds day of surgery        Before Your Surgery      Call your surgeon if there is any change in your health. This includes signs of a cold or flu (such as a sore throat, runny nose, cough, rash or fever).    Do not smoke, drink alcohol or take over the counter medicine (unless your surgeon or primary care doctor tells you to) for the 24 hours before and after surgery.    If you take prescribed drugs: Follow your doctor s orders about which medicines to take and which to stop until after surgery.    Eating and drinking prior to surgery: follow the instructions from your surgeon    Take a shower or bath the night before surgery. Use the soap your surgeon gave you to gently clean your skin. If you do not have soap from your surgeon, use your regular soap. Do not shave or scrub the surgery site.  Wear clean pajamas and have clean sheets on your bed.

## 2018-05-14 NOTE — PROGRESS NOTES
[facility]  [address]  [address]  [place] [address]  [phone]  Dept: [phone]    PRE-OP EVALUATION:  Today's date: 2018    Pam Gaviria (: 1970) presents for pre-operative evaluation assessment as requested by Dr. Bob Nguyen.  She requires evaluation and anesthesia risk assessment prior to undergoing surgery/procedure for treatment of breast reduction .    Fax number for surgical facility: available electronically  Primary Physician: Jackelin Parker  Type of Anesthesia Anticipated: General    Patient has a Health Care Directive or Living Will:  NO    Preop Questions 2018   Who is doing your surgery? dr bob Doshi   What are you having done? Breast Reduction   Date of Surgery/Procedure: 2018   Facility or Hospital where procedure/surgery will be performed: [facility] outpatient   1.  Do you have a history of Heart attack, stroke, stent, coronary bypass surgery, or other heart surgery? No   2.  Do you ever have any pain or discomfort in your chest? No   3.  Do you have a history of  Heart Failure? No   4.   Are you troubled by shortness of breath when:  walking on a level surface, or up a slight hill, or at night? No   5.  Do you currently have a cold, bronchitis or other respiratory infection? No   6.  Do you have a cough, shortness of breath, or wheezing? No   7.  Do you sometimes get pains in the calves of your legs when you walk? No   8. Do you or anyone in your family have previous history of blood clots? No   9.  Do you or does anyone in your family have a serious bleeding problem such as prolonged bleeding following surgeries or cuts? No   10. Have you ever had problems with anemia or been told to take iron pills? No   11. Have you had any abnormal blood loss such as black, tarry or bloody stools, or abnormal vaginal bleeding? No   12. Have you ever had a blood transfusion? No   13. Have you or any of your  relatives ever had problems with anesthesia? No   14. Do you have sleep apnea, excessive snoring or daytime drowsiness? No   15. Do you have any prosthetic heart valves? No   16. Do you have prosthetic joints? No   17. Is there any chance that you may be pregnant? No         HPI:     HPI related to upcoming procedure:       See problem list for active medical problems.  Problems all longstanding and stable, except as noted/documented.  See ROS for pertinent symptoms related to these conditions.                                                                                                                                                          .  ASTHMA - Patient has a longstanding history of moderate-persistent Asthma . Patient has been doing well overall noting NO SYMPTOMS                                                                                                                                               .    MEDICAL HISTORY:     Patient Active Problem List    Diagnosis Date Noted     Morbid obesity (H) 05/12/2017     Priority: High     BMI 45.0-49.9, adult (H) 02/06/2018     Priority: Medium     Latent tuberculosis 07/13/2015     Priority: Medium     Treated.  Chronically positive TB test.       CARDIOVASCULAR SCREENING; LDL GOAL LESS THAN 160 01/19/2015     Priority: Medium     Chronic rhinitis 01/19/2015     Priority: Medium     Heel pain 01/19/2015     Priority: Medium     Moderate persistent asthma 01/19/2015     Priority: Medium      History reviewed. No pertinent past medical history.  History reviewed. No pertinent surgical history.  Current Outpatient Prescriptions   Medication Sig Dispense Refill     azelastine (ASTELIN) 0.1 % spray Spray 1-2 sprays into both nostrils 2 times daily 1 Bottle 2     budesonide-formoterol (SYMBICORT) 160-4.5 MCG/ACT Inhaler Inhale 2 puffs into the lungs 2 times daily 1 Inhaler 11     cetirizine (ZYRTEC) 10 MG tablet Take 10 mg by mouth daily       etonogestrel  "(IMPLANON/NEXPLANON) 68 MG IMPL 1 each (68 mg) by Subdermal route once for 1 dose 1 each 0     fluticasone (FLONASE) 50 MCG/ACT spray Spray 2 sprays into both nostrils daily 1 Bottle 11     montelukast (SINGULAIR) 10 MG tablet Take 1 tablet (10 mg) by mouth At Bedtime 90 tablet 3     phentermine (ADIPEX-P) 37.5 MG tablet Take 1 tablet (37.5 mg) by mouth every morning (before breakfast) 32 tablet 2     Tetrahydrozoline-Zn Sulfate (EYE DROPS ALLERGY RELIEF OP)        UNABLE TO FIND MEDICATION NAME: Move Fee Ultra       UNABLE TO FIND MEDICATION NAME: Women's Ulta [employer] Energy and Metabolism supplement       albuterol (PROAIR HFA/PROVENTIL HFA/VENTOLIN HFA) 108 (90 Base) MCG/ACT Inhaler Inhale 2 puffs into the lungs every 6 hours as needed for shortness of breath / dyspnea or wheezing (Patient not taking: Reported on 5/14/2018) 1 Inhaler 11     UNABLE TO FIND MEDICATION NAME: Hair Skin and Nails supplement       OTC products: none    No Known Allergies   Latex Allergy: NO    Social History   Substance Use Topics     Smoking status: Never Smoker     Smokeless tobacco: Never Used     Alcohol use Yes      Comment: 1 time evry 3 months, 1 per time     History   Drug Use No       REVIEW OF SYSTEMS:   Constitutional, neuro, ENT, endocrine, pulmonary, cardiac, gastrointestinal, genitourinary, musculoskeletal, integument and psychiatric systems are negative, except as otherwise noted.    EXAM:   /58 (BP Location: Right arm, Cuff Size: Adult Large)  Pulse 96  Temp 97.8  F (36.6  C) (Tympanic)  Ht 5' 2\" (1.575 m)  Wt 226 lb 9.6 oz (102.8 kg)  SpO2 97%  BMI 41.45 kg/m2    GENERAL APPEARANCE: healthy, alert and no distress     EYES: EOMI, PERRL     HENT: ear canals and TM's normal and nose and mouth without ulcers or lesions     NECK: no adenopathy, no asymmetry, masses, or scars and thyroid normal to palpation     RESP: lungs clear to auscultation - no rales, rhonchi or wheezes     CV: regular rates and rhythm, normal " S1 S2, no S3 or S4 and no murmur, click or rub     ABDOMEN:  soft, nontender, no HSM or masses and bowel sounds normal     MS: extremities normal- no gross deformities noted, no evidence of inflammation in joints, FROM in all extremities.     SKIN: no suspicious lesions or rashes     NEURO: Normal strength and tone, sensory exam grossly normal, mentation intact and speech normal     PSYCH: mentation appears normal. and affect normal/bright     LYMPHATICS: No cervical adenopathy    DIAGNOSTICS:   EKG: appears normal, NSR, normal axis, normal intervals, no acute ST/T changes c/w ischemia, no LVH by voltage criteria, unchanged from previous tracings    Recent Labs   Lab Test  02/06/18   1006   NA  140   POTASSIUM  4.5   CR  0.90   A1C  5.4        IMPRESSION:   Reason for surgery/procedure: Breast reduction    The proposed surgical procedure is considered INTERMEDIATE risk.    REVISED CARDIAC RISK INDEX  The patient has the following serious cardiovascular risks for perioperative complications such as (MI, PE, VFib and 3  AV Block):  No serious cardiac risks    The patient has the following additional risks for perioperative complications:  No identified additional risks  Morbid obesity      ICD-10-CM    1. Preop general physical exam Z01.818 EKG 12-lead complete w/read - Clinics  Patient states it takes her longer to come out of anesthesia than usual.      CBC with platelets differential   2. Large breasts N62    3. Moderate persistent asthma without complication J45.40    4. Posterior right knee pain M25.561 US Lower Extremity Venous Duplex Right. Still having right posterior knee pain. No swelling or erythema but will do RLE US to rule out DVT prior to ortho appt.        RECOMMENDATIONS:     APPROVAL GIVEN to proceed with proposed procedure, without further diagnostic evaluation       Signed Electronically by: JOSEFINA Horne CNP    Copy of this evaluation report is provided to requesting  physician.    [facility] Preop Guidelines    Revised Cardiac Risk Index

## 2018-05-15 ENCOUNTER — OFFICE VISIT (OUTPATIENT)
Dept: ORTHOPEDICS | Facility: CLINIC | Age: 48
End: 2018-05-15
Payer: COMMERCIAL

## 2018-05-15 ENCOUNTER — RADIANT APPOINTMENT (OUTPATIENT)
Dept: GENERAL RADIOLOGY | Facility: CLINIC | Age: 48
End: 2018-05-15
Attending: FAMILY MEDICINE
Payer: COMMERCIAL

## 2018-05-15 VITALS
SYSTOLIC BLOOD PRESSURE: 106 MMHG | DIASTOLIC BLOOD PRESSURE: 64 MMHG | HEIGHT: 62 IN | WEIGHT: 226 LBS | BODY MASS INDEX: 41.59 KG/M2

## 2018-05-15 DIAGNOSIS — M17.0 PRIMARY OSTEOARTHRITIS OF BOTH KNEES: ICD-10-CM

## 2018-05-15 DIAGNOSIS — M25.561 ARTHRALGIA OF RIGHT LOWER LEG: Primary | ICD-10-CM

## 2018-05-15 DIAGNOSIS — M25.561 ARTHRALGIA OF RIGHT LOWER LEG: ICD-10-CM

## 2018-05-15 DIAGNOSIS — G57.91 NEUROPATHY OF RIGHT LOWER EXTREMITY: ICD-10-CM

## 2018-05-15 PROCEDURE — 76882 US LMTD JT/FCL EVL NVASC XTR: CPT | Performed by: FAMILY MEDICINE

## 2018-05-15 PROCEDURE — 99243 OFF/OP CNSLTJ NEW/EST LOW 30: CPT | Mod: 25 | Performed by: FAMILY MEDICINE

## 2018-05-15 PROCEDURE — 73562 X-RAY EXAM OF KNEE 3: CPT

## 2018-05-15 NOTE — LETTER
5/15/2018         RE: Pam Gaviria  [address]  [place] [address]        Dear Colleague,    Thank you for referring your patient, Pam Gaviria, to the [facility]. Please see a copy of my visit note below.    ASSESSMENT & PLAN    1. Arthralgia of right lower leg    2. Primary osteoarthritis of both knees    3. Neuropathy of right lower extremity      Discussed exam and US - peripheral neuropathy  Discussed xray - mild arthritis / joint space narrowing and questionable exostosis off tibial platuea. Not causative of posterior knee pain.   Will forward final radiology read over [email or web address]  Physical therapy: [facility] - [phone]. Work with Mari Centeno  Given intermittent / positional nature of symptoms will proceed with avoiding aggravating events rather than prescribing medications.    Follow up over [email or web address] if no improvement after 3 therapy sessions.     -----    SUBJECTIVE  Pam Gaviria is a/an 47 year old female who is seen in consultation at the request of  Jackelin Parker PA-C for evaluation of right posterior leg pain. The patient is seen by themselves.    Onset: 1 month(s) ago. Reports insidious onset without acute precipitating event.  Location of Pain: right posterior thigh/knee with radiation into the calf  Rating of Pain at worst: 8/10  Rating of Pain Currently: 5/10  Worsened by: driving, pressure to the back of the knee, laying in bed with leg straight, deep knee flexion  Better with: walking, keeping pressure off the back of the thigh/knee  Treatments tried: Tylenol, Ibuprofen, Aleve, ice/heat, elevation, rolling the knee, stretching  Associated symptoms: crepitus in the knee, pain radiating into the lower leg and occasionally up to the buttock  Orthopedic history: left ACL tear- treated conservatively  Relevant surgical history: NO  Patient Social History: works in a computer based job    Patient's past medical, surgical, social, and  "family histories were reviewed today and no changes are noted.    REVIEW OF SYSTEMS:  10 point ROS is negative other than symptoms noted above in HPI, Past Medical History or as stated below  Constitutional: NEGATIVE for fever, chills, change in weight  Skin: NEGATIVE for worrisome rashes, moles or lesions  GI/: NEGATIVE for bowel or bladder changes  Neuro: NEGATIVE for weakness, dizziness or paresthesias    OBJECTIVE:  /64  Ht 5' 2\" (1.575 m)  Wt 226 lb (102.5 kg)  BMI 41.34 kg/m2   General: healthy, alert and in no distress  HEENT: no scleral icterus or conjunctival erythema  Skin: no suspicious lesions or rash. No jaundice.  CV: no pedal edema  Resp: normal respiratory effort without conversational dyspnea   Psych: normal mood and affect  Gait: normal steady gait with appropriate coordination and balance  Neuro: Normal light sensory exam of lower extremity  MSK:  RIGHT KNEE  Inspection:    normal alignment  Palpation:    Tender about the posterior thigh / medial and lateral distal hamstring but no tendinous insertions. Tender over peroneal at/just superior to fibular head. Very tender over medial > lateral joint line and medial tibial plateau. Remainder of bony and ligamentous landmarks are nontender.  Range of Motion:     00 extension to 1200 flexion with pain / radiating pain with deeper flexion  Strength:    Extensor mechanism intact    Limited US scan of the right posterior knee/thigh:  History: Pain   Findings: Scan of the posterior knee / fibular head shows normal appearing peronal nerve measuring 0.33cm.  As the nerve is tracked cephalad it's overall size and fascicles enlarge with largest size measuring 0.56cm.  This area of increased size correlates with her discomfort and with sonopalpation causes radiating pain into her lower leg.  Interpretation: 1) peroneal neuropathy    Independent visualization of the below image:    Recent Results (from the past 24 hour(s))   US Lower Extremity Venous " Duplex Right    Narrative    VENOUS ULTRASOUND RIGHT LEG May 14, 2018 2:36 PM     HISTORY: Posterior right knee pain.    COMPARISON: None.    FINDINGS: Examination of the deep veins with graded compression and  color flow Doppler with spectral wave form analysis shows no evidence  of thrombus in the right common femoral vein, femoral vein, popliteal  vein or calf veins.      The left common femoral vein was evaluated for comparison and is  normal.      Impression    IMPRESSION: No deep vein thrombosis in the right lower extremity.      ALEN CORDON DO     Patient's conditions were thoroughly discussed during today's visit with greater than 50% of the visit spent counseling the patient with total time spent face-to-face with the patient being 30 minutes with US scan taking 10 minutes.    Ermelinda Piña DO [facility]      Again, thank you for allowing me to participate in the care of your patient.        Sincerely,        Ermelinda Piña DO

## 2018-05-15 NOTE — MR AVS SNAPSHOT
After Visit Summary   5/15/2018    Pam Gaviria    MRN: [id number]           Patient Information     Date Of Birth          1970        Visit Information        Provider Department      5/15/2018 10:40 AM Ermelinda Piña, DO [facility] SPORTS MEDICINE        Today's Diagnoses     Arthralgia of right lower leg    -  1    Primary osteoarthritis of both knees        Neuropathy of right lower extremity          Care Instructions    1. Arthralgia of right lower leg    2. Primary osteoarthritis of both knees    3. Neuropathy of right lower extremity      Discussed exam and US - peripheral neuropathy causing pain in thigh / lower leg  Discussed xray - mild arthritis / joint space narrowing and questionable exostosis on the right knee. This is not what is causing your posterior thigh/knee pain.  Will forward final radiology read for your knee xrays over Xiaoi Robert  Physical therapy: [facility] - [phone]. Work with Mari Centeno    Follow up over Xiaoi Robert if no improvement after 3 therapy sessions.           Follow-ups after your visit        Additional Services     JANESSA PT, HAND, AND CHIROPRACTIC REFERRAL       **This order will print in the [facility] Scheduling Office**    Physical Therapy, Hand Therapy and Chiropractic Care are available through:    *[facility]  *[facility]  *[facility]    Call one number to schedule at any of the above locations: [phone].    Your provider has referred you to: Physical [place] at [facility] or [facility] [place]    Indication/Reason for Referral: Right posterior knee pain (hamstring) - appears distal neuropathy / peroneal  Onset of Illness: see chart  Therapy Orders: Evaluate and Treat  Special Programs: None  Special Request: Mari Pugh      Additional Comments for the Therapist or Chiropractor: presents as distal neuropathy but no radic component directly from the back    Please be  aware that coverage of these services is subject to the terms and limitations of your health insurance plan.  Call member services at your health plan with any benefit or coverage questions.      Please bring the following to your appointment:    *Your personal calendar for scheduling future appointments  *Comfortable clothing                  Your next 10 appointments already scheduled     May 17, 2018  9:45 AM CDT   Screening Mammogram with EAMA1   [facility] [place] ([facility])    [address]  [place] [address] [phone]           Do NOT use body powder, lotions, perfume or deodorant the day of the exam.  If your last mammogram was not done at [facility], please bring your mammogram films. We will need the name of your provider to send a copy of your report.  A mammogram may be covered on an annual or biannual basis, please check with your insurance company.            May 17, 2018 10:00 AM CDT   LAB with EA LAB   [facility] [place] ([facility])    [address]  [address]  [place] [address] [phone]           Please do not eat 10-12 hours before your appointment if you are coming in fasting for labs on lipids, cholesterol, or glucose (sugar). This does not apply to pregnant women. Water, hot tea and black coffee (with nothing added) are okay. Do not drink other fluids, diet soda or chew gum.            May 22, 2018 12:50 PM CDT   (Arrive by 12:35 PM)   JANESSA Extremity with Mari Centeno PT   [facility] ([facility]  )    [address]  [address]  [place] [address]   [phone]            Jun 01, 2018  9:40 AM CDT   JANESSA Extremity with Mari Centeno PT   [facility] ([facility]  )    [address]  [address]  [place] [address]   [phone]            Jun 06, 2018   Procedure with Maru Nguyen MD   [facility] (--)    [address]  [place] [address]   [phone]            Jun 07, 2018  9:00 AM CDT   JANESSA Extremity with Mari Centeno, PT   [facility] ([facility]  )    [address]  [address]  [place] [address]   [phone]              Who to contact     If you have questions or need follow up information about today's clinic visit or your schedule please contact [facility] SPORTS MEDICINE directly at [phone].  Normal or non-critical lab and imaging results will be communicated to you by [employer], letter or phone within 4 business days after the clinic has received the results. If you do not hear from us within 7 days, please contact the clinic through [employer] or phone. If you have a critical or abnormal lab result, we will notify you by phone as soon as possible.  Submit refill requests through [employer] or call your pharmacy and they will forward the refill request to us. Please allow 3 business days for your refill to be completed.          Additional Information About Your Visit        [employer] Information     [employer] gives you secure access to your electronic health record. If you see a primary care provider, you can also send messages to your care team and make appointments. If you have questions, please call your primary care clinic.  If you do not have a primary care provider, please call [phone] and they will assist you.        Care EveryWhere ID     This is your Care EveryWhere ID. This could be used by other organizations to access your [facility] medical records  [id number]        Your Vitals Were     Height BMI (Body Mass Index)                5' 2\" (1.575 m) 41.34 kg/m2           Blood Pressure from Last 3 Encounters:   05/15/18 106/64   05/14/18 106/58   05/07/18 108/60    Weight from Last 3 Encounters:   05/15/18 226 lb (102.5 kg)   05/14/18 226 lb 9.6 oz (102.8 kg)   05/07/18 228 lb 12.8 oz (103.8 kg)              We Performed the Following     JANESSA PT, HAND, AND CHIROPRACTIC REFERRAL  "       Primary Care Provider Office Phone # Fax #    JOSEFINA Horne STEVE [phone] [address] [place] [address]        Equal Access to Services     VANIA PATTON : Haddonald timothy garg ryanno Sodaphnie, waaxda luqadaha, qaybta kaalmada adeluz elenada, yi lindo laReymundokemal aquino. So [facility] [phone].    ATENCIÓN: Si habla español, tiene a pedarza disposición servicios gratuitos de asistencia lingüística. Llame al [phone].    We comply with applicable federal civil rights laws and [place] laws. We do not discriminate on the basis of race, color, national origin, age, disability, sex, sexual orientation, or gender identity.            Thank you!     Thank you for choosing [facility]  for your care. Our goal is always to provide you with excellent care. Hearing back from our patients is one way we can continue to improve our services. Please take a few minutes to complete the written survey that you may receive in the mail after your visit with us. Thank you!             Your Updated Medication List - Protect others around you: Learn how to safely use, store and throw away your medicines at www.disposemymeds.org.          This list is accurate as of 5/15/18 11:59 PM.  Always use your most recent med list.                   Brand Name Dispense Instructions for use Diagnosis    albuterol 108 (90 Base) MCG/ACT Inhaler    PROAIR HFA/PROVENTIL HFA/VENTOLIN HFA    1 Inhaler    Inhale 2 puffs into the lungs every 6 hours as needed for shortness of breath / dyspnea or wheezing        azelastine 0.1 % spray    ASTELIN    1 Bottle    Spray 1-2 sprays into both nostrils 2 times daily        budesonide-formoterol 160-4.5 MCG/ACT Inhaler    SYMBICORT    1 Inhaler    Inhale 2 puffs into the lungs 2 times daily        cetirizine 10 MG tablet    zyrTEC     Take 10 mg by mouth daily        etonogestrel 68 MG Impl    IMPLANON/NEXPLANON    1 each    1 each (68 mg)  by Subdermal route once for 1 dose    Nexplanon insertion       EYE DROPS ALLERGY RELIEF OP           fluticasone 50 MCG/ACT spray    FLONASE    1 Bottle    Spray 2 sprays into both nostrils daily    Chronic allergic rhinitis, unspecified seasonality, unspecified trigger       montelukast 10 MG tablet    SINGULAIR    90 tablet    Take 1 tablet (10 mg) by mouth At Bedtime    Chronic allergic rhinitis, unspecified seasonality, unspecified trigger       phentermine 37.5 MG tablet    ADIPEX-P    32 tablet    Take 1 tablet (37.5 mg) by mouth every morning (before breakfast)    BMI 45.0-49.9, adult (H), Morbid obesity (H)       * UNABLE TO FIND      MEDICATION NAME: Move Fee Ultra        * UNABLE TO FIND      MEDICATION NAME: Hair Skin and Nails supplement        * UNABLE TO FIND      MEDICATION NAME: Women's Ulta [employer] and Metabolism supplement        * Notice:  This list has 3 medication(s) that are the same as other medications prescribed for you. Read the directions carefully, and ask your doctor or other care provider to review them with you.

## 2018-05-15 NOTE — PROGRESS NOTES
ASSESSMENT & PLAN    1. Arthralgia of right lower leg    2. Primary osteoarthritis of both knees    3. Neuropathy of right lower extremity      Discussed exam and US - peripheral neuropathy  Discussed xray - mild arthritis / joint space narrowing and questionable exostosis off tibial platuea. Not causative of posterior knee pain.   Will forward final radiology read over [employer]  Physical therapy: [facility] - [phone]. Work with Mari Centeno  Given intermittent / positional nature of symptoms will proceed with avoiding aggravating events rather than prescribing medications.    Follow up over [employer] if no improvement after 3 therapy sessions.     -----    SUBJECTIVE  Pam Gaviria is a/an 47 year old female who is seen in consultation at the request of  Jackelin Parker PA-C for evaluation of right posterior leg pain. The patient is seen by themselves.    Onset: 1 month(s) ago. Reports insidious onset without acute precipitating event.  Location of Pain: right posterior thigh/knee with radiation into the calf  Rating of Pain at worst: 8/10  Rating of Pain Currently: 5/10  Worsened by: driving, pressure to the back of the knee, laying in bed with leg straight, deep knee flexion  Better with: walking, keeping pressure off the back of the thigh/knee  Treatments tried: Tylenol, Ibuprofen, Aleve, ice/heat, elevation, rolling the knee, stretching  Associated symptoms: crepitus in the knee, pain radiating into the lower leg and occasionally up to the buttock  Orthopedic history: left ACL tear- treated conservatively  Relevant surgical history: NO  Patient Social History: works in a computer based job    Patient's past medical, surgical, social, and family histories were reviewed today and no changes are noted.    REVIEW OF SYSTEMS:  10 point ROS is negative other than symptoms noted above in HPI, Past Medical History or as stated below  Constitutional: NEGATIVE for fever, chills, change in  "weight  Skin: NEGATIVE for worrisome rashes, moles or lesions  GI/: NEGATIVE for bowel or bladder changes  Neuro: NEGATIVE for weakness, dizziness or paresthesias    OBJECTIVE:  /64  Ht 5' 2\" (1.575 m)  Wt 226 lb (102.5 kg)  BMI 41.34 kg/m2   General: healthy, alert and in no distress  HEENT: no scleral icterus or conjunctival erythema  Skin: no suspicious lesions or rash. No jaundice.  CV: no pedal edema  Resp: normal respiratory effort without conversational dyspnea   Psych: normal mood and affect  Gait: normal steady gait with appropriate coordination and balance  Neuro: Normal light sensory exam of lower extremity  MSK:  RIGHT KNEE  Inspection:    normal alignment  Palpation:    Tender about the posterior thigh / medial and lateral distal hamstring but no tendinous insertions. Tender over peroneal at/just superior to fibular head. Very tender over medial > lateral joint line and medial tibial plateau. Remainder of bony and ligamentous landmarks are nontender.  Range of Motion:     00 extension to 1200 flexion with pain / radiating pain with deeper flexion  Strength:    Extensor mechanism intact    Limited US scan of the right posterior knee/thigh:  History: Pain   Findings: Scan of the posterior knee / fibular head shows normal appearing peronal nerve measuring 0.33cm.  As the nerve is tracked cephalad it's overall size and fascicles enlarge with largest size measuring 0.56cm.  This area of increased size correlates with her discomfort and with sonopalpation causes radiating pain into her lower leg.  Interpretation: 1) peroneal neuropathy    Independent visualization of the below image:    Recent Results (from the past 24 hour(s))   US Lower Extremity Venous Duplex Right    Narrative    VENOUS ULTRASOUND RIGHT LEG May 14, 2018 2:36 PM     HISTORY: Posterior right knee pain.    COMPARISON: None.    FINDINGS: Examination of the deep veins with graded compression and  color flow Doppler with spectral " wave form analysis shows no evidence  of thrombus in the right common femoral vein, femoral vein, popliteal  vein or calf veins.      The left common femoral vein was evaluated for comparison and is  normal.      Impression    IMPRESSION: No deep vein thrombosis in the right lower extremity.      ALEN CORDON DO     Patient's conditions were thoroughly discussed during today's visit with greater than 50% of the visit spent counseling the patient with total time spent face-to-face with the patient being 30 minutes with US scan taking 10 minutes.    Ermelinda Piña DO [facility]

## 2018-05-15 NOTE — Clinical Note
Enrrique Guerra, Sending Pam to you - having distal neuropathic symptoms with negative testing of her back. Work your magic! :)  Ermelinda

## 2018-05-15 NOTE — PATIENT INSTRUCTIONS
1. Arthralgia of right lower leg    2. Primary osteoarthritis of both knees    3. Neuropathy of right lower extremity      Discussed exam and US - peripheral neuropathy causing pain in thigh / lower leg  Discussed xray - mild arthritis / joint space narrowing and questionable exostosis on the right knee. This is not what is causing your posterior thigh/knee pain.  Will forward final radiology read for your knee xrays over [employer]  Physical therapy: [facility] - [phone]. Work with Mari Centeno    Follow up over [employer] if no improvement after 3 therapy sessions.

## 2018-05-17 DIAGNOSIS — Z12.31 VISIT FOR SCREENING MAMMOGRAM: ICD-10-CM

## 2018-05-17 DIAGNOSIS — Z01.818 PREOP GENERAL PHYSICAL EXAM: ICD-10-CM

## 2018-05-17 LAB
BASOPHILS # BLD AUTO: 0 10E9/L (ref 0–0.2)
BASOPHILS NFR BLD AUTO: 0.3 %
DIFFERENTIAL METHOD BLD: NORMAL
EOSINOPHIL # BLD AUTO: 0.3 10E9/L (ref 0–0.7)
EOSINOPHIL NFR BLD AUTO: 3.4 %
ERYTHROCYTE [DISTWIDTH] IN BLOOD BY AUTOMATED COUNT: 13.9 % (ref 10–15)
HCT VFR BLD AUTO: 40.2 % (ref 35–47)
HGB BLD-MCNC: 13.2 G/DL (ref 11.7–15.7)
LYMPHOCYTES # BLD AUTO: 3.2 10E9/L (ref 0.8–5.3)
LYMPHOCYTES NFR BLD AUTO: 40.2 %
MCH RBC QN AUTO: 29.8 PG (ref 26.5–33)
MCHC RBC AUTO-ENTMCNC: 32.8 G/DL (ref 31.5–36.5)
MCV RBC AUTO: 91 FL (ref 78–100)
MONOCYTES # BLD AUTO: 0.7 10E9/L (ref 0–1.3)
MONOCYTES NFR BLD AUTO: 8.3 %
NEUTROPHILS # BLD AUTO: 3.7 10E9/L (ref 1.6–8.3)
NEUTROPHILS NFR BLD AUTO: 47.8 %
PLATELET # BLD AUTO: 306 10E9/L (ref 150–450)
RBC # BLD AUTO: 4.43 10E12/L (ref 3.8–5.2)
WBC # BLD AUTO: 7.8 10E9/L (ref 4–11)

## 2018-05-17 PROCEDURE — 85025 COMPLETE CBC W/AUTO DIFF WBC: CPT | Performed by: INTERNAL MEDICINE

## 2018-05-17 PROCEDURE — 36415 COLL VENOUS BLD VENIPUNCTURE: CPT | Performed by: INTERNAL MEDICINE

## 2018-05-17 PROCEDURE — 77067 SCR MAMMO BI INCL CAD: CPT | Mod: TC

## 2018-05-22 ENCOUNTER — THERAPY VISIT (OUTPATIENT)
Dept: PHYSICAL THERAPY | Facility: CLINIC | Age: 48
End: 2018-05-22
Payer: COMMERCIAL

## 2018-05-22 DIAGNOSIS — M25.561 ACUTE PAIN OF RIGHT KNEE: Primary | ICD-10-CM

## 2018-05-22 PROCEDURE — 97161 PT EVAL LOW COMPLEX 20 MIN: CPT | Mod: GP | Performed by: PHYSICAL THERAPIST

## 2018-05-22 PROCEDURE — 97110 THERAPEUTIC EXERCISES: CPT | Mod: GP | Performed by: PHYSICAL THERAPIST

## 2018-05-22 ASSESSMENT — ACTIVITIES OF DAILY LIVING (ADL)
SWELLING: I DO NOT HAVE THE SYMPTOM
SIT WITH YOUR KNEE BENT: ACTIVITY IS FAIRLY DIFFICULT
GIVING WAY, BUCKLING OR SHIFTING OF KNEE: I DO NOT HAVE THE SYMPTOM
WEAKNESS: I DO NOT HAVE THE SYMPTOM
RISE FROM A CHAIR: ACTIVITY IS NOT DIFFICULT
KNEE_ACTIVITY_OF_DAILY_LIVING_SCORE: 72.86
GO UP STAIRS: ACTIVITY IS MINIMALLY DIFFICULT
GO DOWN STAIRS: ACTIVITY IS MINIMALLY DIFFICULT
AS_A_RESULT_OF_YOUR_KNEE_INJURY,_HOW_WOULD_YOU_RATE_YOUR_CURRENT_LEVEL_OF_DAILY_ACTIVITY?: NEARLY NORMAL
KNEE_ACTIVITY_OF_DAILY_LIVING_SUM: 51
HOW_WOULD_YOU_RATE_THE_OVERALL_FUNCTION_OF_YOUR_KNEE_DURING_YOUR_USUAL_DAILY_ACTIVITIES?: NEARLY NORMAL
STIFFNESS: I DO NOT HAVE THE SYMPTOM
HOW_WOULD_YOU_RATE_THE_CURRENT_FUNCTION_OF_YOUR_KNEE_DURING_YOUR_USUAL_DAILY_ACTIVITIES_ON_A_SCALE_FROM_0_TO_100_WITH_100_BEING_YOUR_LEVEL_OF_KNEE_FUNCTION_PRIOR_TO_YOUR_INJURY_AND_0_BEING_THE_INABILITY_TO_PERFORM_ANY_OF_YOUR_USUAL_DAILY_ACTIVITIES?: 20
SQUAT: I AM UNABLE TO DO THE ACTIVITY
STAND: ACTIVITY IS NOT DIFFICULT
LIMPING: THE SYMPTOM AFFECTS MY ACTIVITY SLIGHTLY
WALK: ACTIVITY IS NOT DIFFICULT
KNEEL ON THE FRONT OF YOUR KNEE: ACTIVITY IS VERY DIFFICULT
RAW_SCORE: 51
PAIN: THE SYMPTOM AFFECTS MY ACTIVITY MODERATELY

## 2018-05-22 NOTE — PROGRESS NOTES
[facility] Initial Evaluation -- Lumbar    Date: May 22, 2018  Pam Gaviria is a 47 year old female with a *** condition.   Referral: ***  Work mechanical stresses:  ***  Employment status:  ***  Leisure mechanical stresses: ***  Functional disability score (BHARTI/STarT Back):  ***  VAS score (0-10): ***  Patient goals/expectations:  ***    HISTORY:    Present symptoms: ***  Pain quality (sharp/shooting/stabbing/aching/burning/cramping):  ***   Paresthesia (yes/no):  ***    Present since (onset date): ***.     Symptoms (improving/unchanging/worsening):  ***.     Symptoms commenced as a result of: ***   Condition occurred in the following environment:   ***     Symptoms at onset (back/thigh/leg): ***  Constant symptoms (back/thigh/leg): ***  Intermittent symptoms (back/thigh/leg): ***    Symptoms are made worse with the following: {JANESSA Lumbar Better/Worse:642808}   Symptoms are made better with the following: {JANESSA Lumbar Better/Worse:236028}    Disturbed sleep (yes/no):  *** Sleeping postures (prone/sup/side R/L): ***    Previous episodes (0/1-5/6-10/11+): *** Year of first episode: ***    Previous history: ***  Previous treatments: ***      Specific Questions:  Cough/Sneeze/Strain (pos/neg): ***  Bowel/Bladder (normal/abnormal): ***  Gait (normal/abnormal): ***  Medications (nil/NSAIDS/analg/steroids/anticoag/other):  {[facility] Medications:040888}  Medical allergies:  ***  General health (excellent/good/fair/poor):  ***  Pertinent medical history:  {[facility] Past Medical History:539316}  Imaging (None/Xray/MRI/Other):  ***  Recent or major surgery (yes/no):  ***  Night pain (yes/no): ***  Accidents (yes/no): ***  Unexplained weight loss (yes/no): ***  Barriers at home: ***  Other red flags: ***    EXAMINATION    Posture:   Sitting (good/fair/poor): ***  Standing (good/fair/poor):***  Lordosis (red/acc/normal): ***  Correction of posture (better/worse/no effect): ***    Lateral Shift (right/left/nil):  "***  Relevant (yes/no):  ***  Other Observations: ***    Neurological:    Motor deficit:  ***  Reflexes:  ***  Sensory deficit:  ***  Dural signs:  ***    Movement Loss:   Jn Mod Min Nil Pain   Flexion     ***   Extension     ***   Side Gliding R     ***   Side Gliding L     ***     Test Movements:   During: produces, abolishes, increases, decreases, no effect, centralizing, peripheralizing   After: better, worse, no better, no worse, no effect, centralized, peripheralized    Pretest symptoms standing: ***   Symptoms During Symptoms After ROM increased ROM decreased No Effect   FIS {JANESSA MDT During Testin::\" \"} {JANESSA MDT After Testin::\" \"}      Rep FIS {JANESSA MDT During Testin::\" \"} {JANESSA MDT After Testin::\" \"}      EIS {JANESSA MDT During Testin::\" \"} {JANESSA MDT After Testin::\" \"}      Rep EIS {JANESSA MDT During Testin::\" \"} {JANESSA MDT After Testin::\" \"}      Pretest symptoms lying: ***    Symptoms During Symptoms After ROM increased ROM decreased No Effect   ZEB {JANESSA MDT During Testin::\" \"} {JANESSA MDT After Testin::\" \"}      Rep ZEB {JANESSA MDT During Testin::\" \"} {JANESSA MDT After Testin::\" \"}      EIL {JANESSA MDT During Testin::\" \"} {JANESSA MDT After Testin::\" \"}      Rep EIL {JANESSA MDT During Testin::\" \"} {JANESSA MDT After Testin::\" \"}      If required, pretest symptoms: ***   Symptoms During Symptoms After ROM increased ROM decreased No Effect   SGIS - R {JANESSA MDT During Testin::\" \"} {JANESSA MDT After Testin::\" \"}      Rep SGIS - R {JANESSA MDT During Testin::\" \"} {JANESSA MDT After Testin::\" \"}      SGIS - L {JANESSA MDT During Testin::\" \"} {JANESSA MDT After Testin::\" \"}      Rep SGIS - L {JANESSA MDT During Testin::\" \"} {JANESSA MDT After Testin::\" \"}        Static Tests:  Sitting slouched:  ***  Sitting erect:  ***  Standing slouched ***  Standing erect:  ***  Lying prone in " extension:  *** Long sitting:  ***    Other Tests: ***    Provisional Classification:  {uriah mdt lumbar classification:062767}    Principle of Management:  Education:  ***   Equipment provided:  ***  Mechanical therapy (Y/N):  ***   Extension principle:  ***  Lateral Principle:  ***  Flexion principle:  ***  Other:  ***    ASSESSMENT/PLAN:    {REHAB NOTES:797627}

## 2018-05-28 PROBLEM — M25.561 RIGHT KNEE PAIN: Status: ACTIVE | Noted: 2018-05-28

## 2018-05-29 NOTE — PROGRESS NOTES
"[facility] Initial Evaluation -- Lumbar    Date: May 22, 2018  Pam Gaviria is a 47 year old female with a R LE2 condition.   Referral: Dr. Piña  Work mechanical stresses:  Computer job, part time substitution teacher  Employment status:  Part time  Leisure mechanical stresses: not able  Functional disability score (BHARTI/STarT Back):  See flow sheet  VAS score (0-10): 5/10  Patient goals/expectations:  \"to get rid of the pain\"    HISTORY:    Present symptoms: R R post thigh, R calf  Pain quality (sharp/shooting/stabbing/aching/burning/cramping):  aching   Paresthesia (yes/no): no    Present since (onset date): April 2018 .     Symptoms (improving/unchanging/worsening):  improving.     Symptoms commenced as a result of: driving in a car with bucket seat   Condition occurred in the following environment:   community     Symptoms at onset (back/thigh/leg): thigh, post knee  Constant symptoms (back/thigh/leg):   Intermittent symptoms (back/thigh/leg): R post thigh    Symptoms are made worse with the following: Always Sitting, Time of day - Always PM and Other - pressure against thigh/back of knee,   Symptoms are made better with the following: Always Standing and Always Walking    Disturbed sleep (yes/no):  Pain with transitions Sleeping postures (prone/sup/side R/L): sides only due to pain with pressure on back of knee    Previous episodes (0/1-5/6-10/11+): 0 Year of first episode: 2018    Previous history: no prior history of similar complaints  Previous treatments: Advil, Advil Aleve      Specific Questions:  Cough/Sneeze/Strain (pos/neg): neg  Bowel/Bladder (normal/abnormal): normal  Gait (normal/abnormal): normal  Medications (nil/NSAIDS/analg/steroids/anticoag/other):  NSAIDS  Medical allergies:  none  General health (excellent/good/fair/poor):  good  Pertinent medical history:  Overweight and Asthma  Imaging (None/Xray/MRI/Other):  US, xrays,   Recent or major surgery (yes/no):  no  Night " pain (yes/no): no  Accidents (yes/no): no  Unexplained weight loss (yes/no): no  Barriers at home: no  Other red flags: no    EXAMINATION    Posture:   Sitting (good/fair/poor): fair/poor  Standing (good/fair/poor):fair  Lordosis (red/acc/normal): acc  Correction of posture (better/worse/no effect): worse--produces tingling medial ankle, bottom R foot    Lateral Shift (right/left/nil): nil  Relevant (yes/no):  no  Other Observations: none    Neurological:    Motor deficit:  n/a  Reflexes:  n/a  Sensory deficit:  n/a  Dural signs:  n/a    Movement Loss:   Jn Mod Min Nil Pain   Flexion   x x    Extension   x x    Side Gliding R   x  P L LBP   Side Gliding L    x      Test Movements:   During: produces, abolishes, increases, decreases, no effect, centralizing, peripheralizing   After: better, worse, no better, no worse, no effect, centralized, peripheralized    Pretest symptoms standing: symptom free   Symptoms During Symptoms After ROM increased ROM decreased No Effect   FIS No Effect    No Effect         Rep FIS No Effect    No Effect      x   EIS Produces    No Worse         Rep EIS Produces    No Worse      X R knee ROM, pain   Pretest symptoms lying: not tested    Symptoms During Symptoms After ROM increased ROM decreased No Effect   ZEB        Rep ZEB        EIL        Rep EIL        If required, pretest symptoms:    Symptoms During Symptoms After ROM increased ROM decreased No Effect   SGIS - R        Rep SGIS - R        SGIS - L        Rep SGIS - L          Static Tests:  Sitting slouched:  No effect  Sitting erect:  No effect  Standing slouched   Standing erect:    Lying prone in extension:   Long sitting:      Other Tests: repeated movement testing R knee: no effect extension; prone knee flexion mobilization: P/NW/decr pain     Provisional Classification:  Other: knee derangement    Principle of Management:  Education:  DP, therapeutic dose of exercise   Equipment provided:    Mechanical therapy (Y/N):    Y  Extension principle:    Lateral Principle:    Flexion principle:    Other:      ASSESSMENT/PLAN:    Patient is a 47 year old female with and R knee complaints.    Patient has the following significant findings with corresponding treatment plan.                Diagnosis 1:  R knee pain  Pain -  self management, education, directional preference exercise and home program  Decreased ROM/flexibility - manual therapy and therapeutic exercise  Impaired muscle performance - neuro re-education  Decreased function - therapeutic activities    Therapy Evaluation Codes:   1) History comprised of:   Personal factors that impact the plan of care:      None.    Comorbidity factors that impact the plan of care are:      Asthma and Overweight.     Medications impacting care: None.  2) Examination of Body Systems comprised of:   Body structures and functions that impact the plan of care:      Knee.   Activity limitations that impact the plan of care are:      Sitting and Squatting/kneeling.  3) Clinical presentation characteristics are:   Stable/Uncomplicated.  4) Decision-Making    Low complexity using standardized patient assessment instrument and/or measureable assessment of functional outcome.  Cumulative Therapy Evaluation is: Low complexity.    Previous and current functional limitations:  (See Goal Flow Sheet for this information)    Short term and Long term goals: (See Goal Flow Sheet for this information)     Communication ability:  Patient appears to be able to clearly communicate and understand verbal and written communication and follow directions correctly.  Treatment Explanation - The following has been discussed with the patient:   RX ordered/plan of care  Anticipated outcomes  Possible risks and side effects  This patient would benefit from PT intervention to resume normal activities.   Rehab potential is good.    Frequency:  1 X week, once daily  Duration:  for 4 weeks  Discharge Plan:  Achieve all  LTG.  Independent in home treatment program.  Reach maximal therapeutic benefit.    Please refer to the daily flowsheet for treatment today, total treatment time and time spent performing 1:1 timed codes.

## 2018-06-01 ENCOUNTER — THERAPY VISIT (OUTPATIENT)
Dept: PHYSICAL THERAPY | Facility: CLINIC | Age: 48
End: 2018-06-01
Payer: COMMERCIAL

## 2018-06-01 DIAGNOSIS — M25.561 ACUTE PAIN OF RIGHT KNEE: ICD-10-CM

## 2018-06-01 PROCEDURE — 97112 NEUROMUSCULAR REEDUCATION: CPT | Mod: GP | Performed by: PHYSICAL THERAPIST

## 2018-06-01 PROCEDURE — 97140 MANUAL THERAPY 1/> REGIONS: CPT | Mod: GP | Performed by: PHYSICAL THERAPIST

## 2018-06-01 PROCEDURE — 97110 THERAPEUTIC EXERCISES: CPT | Mod: GP | Performed by: PHYSICAL THERAPIST

## 2018-06-04 NOTE — H&P (VIEW-ONLY)
[facility]  [address]  [address]  [place] [address]  [phone]  Dept: [phone]    PRE-OP EVALUATION:  Today's date: 2018    Pam Gaviria (: 1970) presents for pre-operative evaluation assessment as requested by Dr. Bob Nguyen.  She requires evaluation and anesthesia risk assessment prior to undergoing surgery/procedure for treatment of breast reduction .    Fax number for surgical facility: available electronically  Primary Physician: Jackelin Parker  Type of Anesthesia Anticipated: General    Patient has a Health Care Directive or Living Will:  NO    Preop Questions 2018   Who is doing your surgery? dr bob Doshi   What are you having done? Breast Reduction   Date of Surgery/Procedure: 2018   Facility or Hospital where procedure/surgery will be performed: [facility] outpatient   1.  Do you have a history of Heart attack, stroke, stent, coronary bypass surgery, or other heart surgery? No   2.  Do you ever have any pain or discomfort in your chest? No   3.  Do you have a history of  Heart Failure? No   4.   Are you troubled by shortness of breath when:  walking on a level surface, or up a slight hill, or at night? No   5.  Do you currently have a cold, bronchitis or other respiratory infection? No   6.  Do you have a cough, shortness of breath, or wheezing? No   7.  Do you sometimes get pains in the calves of your legs when you walk? No   8. Do you or anyone in your family have previous history of blood clots? No   9.  Do you or does anyone in your family have a serious bleeding problem such as prolonged bleeding following surgeries or cuts? No   10. Have you ever had problems with anemia or been told to take iron pills? No   11. Have you had any abnormal blood loss such as black, tarry or bloody stools, or abnormal vaginal bleeding? No   12. Have you ever had a blood transfusion? No   13. Have you or any of your  relatives ever had problems with anesthesia? No   14. Do you have sleep apnea, excessive snoring or daytime drowsiness? No   15. Do you have any prosthetic heart valves? No   16. Do you have prosthetic joints? No   17. Is there any chance that you may be pregnant? No         HPI:     HPI related to upcoming procedure:       See problem list for active medical problems.  Problems all longstanding and stable, except as noted/documented.  See ROS for pertinent symptoms related to these conditions.                                                                                                                                                          .  ASTHMA - Patient has a longstanding history of moderate-persistent Asthma . Patient has been doing well overall noting NO SYMPTOMS                                                                                                                                               .    MEDICAL HISTORY:     Patient Active Problem List    Diagnosis Date Noted     Morbid obesity (H) 05/12/2017     Priority: High     BMI 45.0-49.9, adult (H) 02/06/2018     Priority: Medium     Latent tuberculosis 07/13/2015     Priority: Medium     Treated.  Chronically positive TB test.       CARDIOVASCULAR SCREENING; LDL GOAL LESS THAN 160 01/19/2015     Priority: Medium     Chronic rhinitis 01/19/2015     Priority: Medium     Heel pain 01/19/2015     Priority: Medium     Moderate persistent asthma 01/19/2015     Priority: Medium      History reviewed. No pertinent past medical history.  History reviewed. No pertinent surgical history.  Current Outpatient Prescriptions   Medication Sig Dispense Refill     azelastine (ASTELIN) 0.1 % spray Spray 1-2 sprays into both nostrils 2 times daily 1 Bottle 2     budesonide-formoterol (SYMBICORT) 160-4.5 MCG/ACT Inhaler Inhale 2 puffs into the lungs 2 times daily 1 Inhaler 11     cetirizine (ZYRTEC) 10 MG tablet Take 10 mg by mouth daily       etonogestrel  "(IMPLANON/NEXPLANON) 68 MG IMPL 1 each (68 mg) by Subdermal route once for 1 dose 1 each 0     fluticasone (FLONASE) 50 MCG/ACT spray Spray 2 sprays into both nostrils daily 1 Bottle 11     montelukast (SINGULAIR) 10 MG tablet Take 1 tablet (10 mg) by mouth At Bedtime 90 tablet 3     phentermine (ADIPEX-P) 37.5 MG tablet Take 1 tablet (37.5 mg) by mouth every morning (before breakfast) 32 tablet 2     Tetrahydrozoline-Zn Sulfate (EYE DROPS ALLERGY RELIEF OP)        UNABLE TO FIND MEDICATION NAME: Move Fee Ultra       UNABLE TO FIND MEDICATION NAME: Women's Ulta [employer] and Metabolism supplement       albuterol (PROAIR HFA/PROVENTIL HFA/VENTOLIN HFA) 108 (90 Base) MCG/ACT Inhaler Inhale 2 puffs into the lungs every 6 hours as needed for shortness of breath / dyspnea or wheezing (Patient not taking: Reported on 5/14/2018) 1 Inhaler 11     UNABLE TO FIND MEDICATION NAME: Hair Skin and Nails supplement       OTC products: none    No Known Allergies   Latex Allergy: NO    Social History   Substance Use Topics     Smoking status: Never Smoker     Smokeless tobacco: Never Used     Alcohol use Yes      Comment: 1 time evry 3 months, 1 per time     History   Drug Use No       REVIEW OF SYSTEMS:   Constitutional, neuro, ENT, endocrine, pulmonary, cardiac, gastrointestinal, genitourinary, musculoskeletal, integument and psychiatric systems are negative, except as otherwise noted.    EXAM:   /58 (BP Location: Right arm, Cuff Size: Adult Large)  Pulse 96  Temp 97.8  F (36.6  C) (Tympanic)  Ht 5' 2\" (1.575 m)  Wt 226 lb 9.6 oz (102.8 kg)  SpO2 97%  BMI 41.45 kg/m2    GENERAL APPEARANCE: healthy, alert and no distress     EYES: EOMI, PERRL     HENT: ear canals and TM's normal and nose and mouth without ulcers or lesions     NECK: no adenopathy, no asymmetry, masses, or scars and thyroid normal to palpation     RESP: lungs clear to auscultation - no rales, rhonchi or wheezes     CV: regular rates and rhythm, normal " S1 S2, no S3 or S4 and no murmur, click or rub     ABDOMEN:  soft, nontender, no HSM or masses and bowel sounds normal     MS: extremities normal- no gross deformities noted, no evidence of inflammation in joints, FROM in all extremities.     SKIN: no suspicious lesions or rashes     NEURO: Normal strength and tone, sensory exam grossly normal, mentation intact and speech normal     PSYCH: mentation appears normal. and affect normal/bright     LYMPHATICS: No cervical adenopathy    DIAGNOSTICS:   EKG: appears normal, NSR, normal axis, normal intervals, no acute ST/T changes c/w ischemia, no LVH by voltage criteria, unchanged from previous tracings    Recent Labs   Lab Test  02/06/18   1006   NA  140   POTASSIUM  4.5   CR  0.90   A1C  5.4        IMPRESSION:   Reason for surgery/procedure: Breast reduction    The proposed surgical procedure is considered INTERMEDIATE risk.    REVISED CARDIAC RISK INDEX  The patient has the following serious cardiovascular risks for perioperative complications such as (MI, PE, VFib and 3  AV Block):  No serious cardiac risks    The patient has the following additional risks for perioperative complications:  No identified additional risks  Morbid obesity      ICD-10-CM    1. Preop general physical exam Z01.818 EKG 12-lead complete w/read - Clinics  Patient states it takes her longer to come out of anesthesia than usual.      CBC with platelets differential   2. Large breasts N62    3. Moderate persistent asthma without complication J45.40    4. Posterior right knee pain M25.561 US Lower Extremity Venous Duplex Right. Still having right posterior knee pain. No swelling or erythema but will do RLE US to rule out DVT prior to ortho appt.        RECOMMENDATIONS:     APPROVAL GIVEN to proceed with proposed procedure, without further diagnostic evaluation       Signed Electronically by: JOSEFINA Horne CNP    Copy of this evaluation report is provided to requesting  physician.    [facility] Preop Guidelines    Revised Cardiac Risk Index

## 2018-06-05 ENCOUNTER — THERAPY VISIT (OUTPATIENT)
Dept: PHYSICAL THERAPY | Facility: CLINIC | Age: 48
End: 2018-06-05
Payer: COMMERCIAL

## 2018-06-05 DIAGNOSIS — M25.561 ACUTE PAIN OF RIGHT KNEE: ICD-10-CM

## 2018-06-05 PROCEDURE — 97140 MANUAL THERAPY 1/> REGIONS: CPT | Mod: GP | Performed by: PHYSICAL THERAPY ASSISTANT

## 2018-06-05 PROCEDURE — 97110 THERAPEUTIC EXERCISES: CPT | Mod: GP | Performed by: PHYSICAL THERAPY ASSISTANT

## 2018-06-05 PROCEDURE — 97112 NEUROMUSCULAR REEDUCATION: CPT | Mod: GP | Performed by: PHYSICAL THERAPY ASSISTANT

## 2018-06-06 ENCOUNTER — SURGERY (OUTPATIENT)
Age: 48
End: 2018-06-06

## 2018-06-06 ENCOUNTER — HOSPITAL ENCOUNTER (OUTPATIENT)
Facility: CLINIC | Age: 48
Discharge: HOME | End: 2018-06-06
Attending: PLASTIC SURGERY | Admitting: PLASTIC SURGERY
Payer: COMMERCIAL

## 2018-06-06 ENCOUNTER — ANESTHESIA EVENT (OUTPATIENT)
Dept: SURGERY | Facility: CLINIC | Age: 48
End: 2018-06-06
Payer: COMMERCIAL

## 2018-06-06 ENCOUNTER — ANESTHESIA (OUTPATIENT)
Dept: SURGERY | Facility: CLINIC | Age: 48
End: 2018-06-06
Payer: COMMERCIAL

## 2018-06-06 VITALS
TEMPERATURE: 97.4 F | OXYGEN SATURATION: 97 % | BODY MASS INDEX: 39.12 KG/M2 | WEIGHT: 220.8 LBS | HEIGHT: 63 IN | DIASTOLIC BLOOD PRESSURE: 78 MMHG | RESPIRATION RATE: 16 BRPM | SYSTOLIC BLOOD PRESSURE: 138 MMHG

## 2018-06-06 DIAGNOSIS — N62 HYPERTROPHY OF BREAST: Primary | ICD-10-CM

## 2018-06-06 LAB — HCG UR QL: NEGATIVE

## 2018-06-06 PROCEDURE — 81025 URINE PREGNANCY TEST: CPT | Performed by: ANESTHESIOLOGY

## 2018-06-06 PROCEDURE — 37000008 ZZH ANESTHESIA TECHNICAL FEE, 1ST 30 MIN: Performed by: PLASTIC SURGERY

## 2018-06-06 PROCEDURE — 71000012 ZZH RECOVERY PHASE 1 LEVEL 1 FIRST HR: Performed by: PLASTIC SURGERY

## 2018-06-06 PROCEDURE — 88305 TISSUE EXAM BY PATHOLOGIST: CPT | Mod: 26 | Performed by: PLASTIC SURGERY

## 2018-06-06 PROCEDURE — 25000132 ZZH RX MED GY IP 250 OP 250 PS 637: Performed by: ANESTHESIOLOGY

## 2018-06-06 PROCEDURE — 88305 TISSUE EXAM BY PATHOLOGIST: CPT | Performed by: PLASTIC SURGERY

## 2018-06-06 PROCEDURE — 71000027 ZZH RECOVERY PHASE 2 EACH 15 MINS: Performed by: PLASTIC SURGERY

## 2018-06-06 PROCEDURE — 25000128 H RX IP 250 OP 636: Performed by: ANESTHESIOLOGY

## 2018-06-06 PROCEDURE — 25000128 H RX IP 250 OP 636: Performed by: PLASTIC SURGERY

## 2018-06-06 PROCEDURE — 37000009 ZZH ANESTHESIA TECHNICAL FEE, EACH ADDTL 15 MIN: Performed by: PLASTIC SURGERY

## 2018-06-06 PROCEDURE — 25000125 ZZHC RX 250: Performed by: PLASTIC SURGERY

## 2018-06-06 PROCEDURE — 25000132 ZZH RX MED GY IP 250 OP 250 PS 637: Performed by: PLASTIC SURGERY

## 2018-06-06 PROCEDURE — 25000125 ZZHC RX 250: Performed by: NURSE ANESTHETIST, CERTIFIED REGISTERED

## 2018-06-06 PROCEDURE — 27210995 ZZH RX 272: Performed by: PLASTIC SURGERY

## 2018-06-06 PROCEDURE — 25000128 H RX IP 250 OP 636: Performed by: NURSE ANESTHETIST, CERTIFIED REGISTERED

## 2018-06-06 PROCEDURE — 40000170 ZZH STATISTIC PRE-PROCEDURE ASSESSMENT II: Performed by: PLASTIC SURGERY

## 2018-06-06 PROCEDURE — 71000013 ZZH RECOVERY PHASE 1 LEVEL 1 EA ADDTL HR: Performed by: PLASTIC SURGERY

## 2018-06-06 PROCEDURE — 36000056 ZZH SURGERY LEVEL 3 1ST 30 MIN: Performed by: PLASTIC SURGERY

## 2018-06-06 PROCEDURE — 36000058 ZZH SURGERY LEVEL 3 EA 15 ADDTL MIN: Performed by: PLASTIC SURGERY

## 2018-06-06 PROCEDURE — 27210794 ZZH OR GENERAL SUPPLY STERILE: Performed by: PLASTIC SURGERY

## 2018-06-06 RX ORDER — PROPOFOL 10 MG/ML
INJECTION, EMULSION INTRAVENOUS PRN
Status: DISCONTINUED | OUTPATIENT
Start: 2018-06-06 | End: 2018-06-06

## 2018-06-06 RX ORDER — LIDOCAINE HYDROCHLORIDE 20 MG/ML
INJECTION, SOLUTION INFILTRATION; PERINEURAL PRN
Status: DISCONTINUED | OUTPATIENT
Start: 2018-06-06 | End: 2018-06-06

## 2018-06-06 RX ORDER — FENTANYL CITRATE 50 UG/ML
25-50 INJECTION, SOLUTION INTRAMUSCULAR; INTRAVENOUS EVERY 5 MIN PRN
Status: DISCONTINUED | OUTPATIENT
Start: 2018-06-06 | End: 2018-06-06 | Stop reason: HOSPADM

## 2018-06-06 RX ORDER — SODIUM CHLORIDE, SODIUM LACTATE, POTASSIUM CHLORIDE, CALCIUM CHLORIDE 600; 310; 30; 20 MG/100ML; MG/100ML; MG/100ML; MG/100ML
INJECTION, SOLUTION INTRAVENOUS CONTINUOUS PRN
Status: DISCONTINUED | OUTPATIENT
Start: 2018-06-06 | End: 2018-06-06

## 2018-06-06 RX ORDER — ONDANSETRON 2 MG/ML
INJECTION INTRAMUSCULAR; INTRAVENOUS PRN
Status: DISCONTINUED | OUTPATIENT
Start: 2018-06-06 | End: 2018-06-06

## 2018-06-06 RX ORDER — HYDROXYZINE HYDROCHLORIDE 50 MG/1
50 TABLET, FILM COATED ORAL ONCE
Status: COMPLETED | OUTPATIENT
Start: 2018-06-06 | End: 2018-06-06

## 2018-06-06 RX ORDER — SODIUM CHLORIDE, SODIUM LACTATE, POTASSIUM CHLORIDE, CALCIUM CHLORIDE 600; 310; 30; 20 MG/100ML; MG/100ML; MG/100ML; MG/100ML
INJECTION, SOLUTION INTRAVENOUS CONTINUOUS
Status: DISCONTINUED | OUTPATIENT
Start: 2018-06-06 | End: 2018-06-06 | Stop reason: HOSPADM

## 2018-06-06 RX ORDER — FENTANYL CITRATE 50 UG/ML
INJECTION, SOLUTION INTRAMUSCULAR; INTRAVENOUS PRN
Status: DISCONTINUED | OUTPATIENT
Start: 2018-06-06 | End: 2018-06-06

## 2018-06-06 RX ORDER — ONDANSETRON 2 MG/ML
4 INJECTION INTRAMUSCULAR; INTRAVENOUS EVERY 30 MIN PRN
Status: DISCONTINUED | OUTPATIENT
Start: 2018-06-06 | End: 2018-06-06 | Stop reason: HOSPADM

## 2018-06-06 RX ORDER — FENTANYL CITRATE 50 UG/ML
25-50 INJECTION, SOLUTION INTRAMUSCULAR; INTRAVENOUS
Status: DISCONTINUED | OUTPATIENT
Start: 2018-06-06 | End: 2018-06-06 | Stop reason: HOSPADM

## 2018-06-06 RX ORDER — DEXAMETHASONE SODIUM PHOSPHATE 4 MG/ML
INJECTION, SOLUTION INTRA-ARTICULAR; INTRALESIONAL; INTRAMUSCULAR; INTRAVENOUS; SOFT TISSUE PRN
Status: DISCONTINUED | OUTPATIENT
Start: 2018-06-06 | End: 2018-06-06

## 2018-06-06 RX ORDER — HYDROMORPHONE HYDROCHLORIDE 1 MG/ML
.3-.5 INJECTION, SOLUTION INTRAMUSCULAR; INTRAVENOUS; SUBCUTANEOUS EVERY 10 MIN PRN
Status: DISCONTINUED | OUTPATIENT
Start: 2018-06-06 | End: 2018-06-06 | Stop reason: HOSPADM

## 2018-06-06 RX ORDER — MEPERIDINE HYDROCHLORIDE 25 MG/ML
12.5 INJECTION INTRAMUSCULAR; INTRAVENOUS; SUBCUTANEOUS
Status: DISCONTINUED | OUTPATIENT
Start: 2018-06-06 | End: 2018-06-06 | Stop reason: HOSPADM

## 2018-06-06 RX ORDER — PROPOFOL 10 MG/ML
INJECTION, EMULSION INTRAVENOUS CONTINUOUS PRN
Status: DISCONTINUED | OUTPATIENT
Start: 2018-06-06 | End: 2018-06-06

## 2018-06-06 RX ORDER — CEFAZOLIN SODIUM 1 G/3ML
1 INJECTION, POWDER, FOR SOLUTION INTRAMUSCULAR; INTRAVENOUS SEE ADMIN INSTRUCTIONS
Status: DISCONTINUED | OUTPATIENT
Start: 2018-06-06 | End: 2018-06-06 | Stop reason: HOSPADM

## 2018-06-06 RX ORDER — HYDROCODONE BITARTRATE AND ACETAMINOPHEN 5; 325 MG/1; MG/1
1-2 TABLET ORAL EVERY 4 HOURS PRN
Qty: 20 TABLET | Refills: 0 | Status: SHIPPED | OUTPATIENT
Start: 2018-06-06 | End: 2019-01-08

## 2018-06-06 RX ORDER — MAGNESIUM HYDROXIDE 1200 MG/15ML
LIQUID ORAL PRN
Status: DISCONTINUED | OUTPATIENT
Start: 2018-06-06 | End: 2018-06-06 | Stop reason: HOSPADM

## 2018-06-06 RX ORDER — BUPIVACAINE HYDROCHLORIDE 2.5 MG/ML
INJECTION, SOLUTION INFILTRATION; PERINEURAL PRN
Status: DISCONTINUED | OUTPATIENT
Start: 2018-06-06 | End: 2018-06-06 | Stop reason: HOSPADM

## 2018-06-06 RX ORDER — ONDANSETRON 4 MG/1
4 TABLET, ORALLY DISINTEGRATING ORAL EVERY 30 MIN PRN
Status: DISCONTINUED | OUTPATIENT
Start: 2018-06-06 | End: 2018-06-06 | Stop reason: HOSPADM

## 2018-06-06 RX ORDER — ACETAMINOPHEN 500 MG
1000 TABLET ORAL ONCE
Status: COMPLETED | OUTPATIENT
Start: 2018-06-06 | End: 2018-06-06

## 2018-06-06 RX ORDER — NALOXONE HYDROCHLORIDE 0.4 MG/ML
.1-.4 INJECTION, SOLUTION INTRAMUSCULAR; INTRAVENOUS; SUBCUTANEOUS
Status: DISCONTINUED | OUTPATIENT
Start: 2018-06-06 | End: 2018-06-06 | Stop reason: HOSPADM

## 2018-06-06 RX ORDER — CEFAZOLIN SODIUM 2 G/100ML
2 INJECTION, SOLUTION INTRAVENOUS
Status: COMPLETED | OUTPATIENT
Start: 2018-06-06 | End: 2018-06-06

## 2018-06-06 RX ORDER — HYDROCODONE BITARTRATE AND ACETAMINOPHEN 5; 325 MG/1; MG/1
1 TABLET ORAL ONCE
Status: COMPLETED | OUTPATIENT
Start: 2018-06-06 | End: 2018-06-06

## 2018-06-06 RX ADMIN — ONDANSETRON 4 MG: 2 INJECTION INTRAMUSCULAR; INTRAVENOUS at 11:43

## 2018-06-06 RX ADMIN — SODIUM CHLORIDE 1000 ML: 900 IRRIGANT IRRIGATION at 09:17

## 2018-06-06 RX ADMIN — FENTANYL CITRATE 25 MCG: 50 INJECTION, SOLUTION INTRAMUSCULAR; INTRAVENOUS at 10:46

## 2018-06-06 RX ADMIN — PROPOFOL 200 MG: 10 INJECTION, EMULSION INTRAVENOUS at 09:16

## 2018-06-06 RX ADMIN — DEXAMETHASONE SODIUM PHOSPHATE 4 MG: 4 INJECTION, SOLUTION INTRA-ARTICULAR; INTRALESIONAL; INTRAMUSCULAR; INTRAVENOUS; SOFT TISSUE at 09:34

## 2018-06-06 RX ADMIN — FENTANYL CITRATE 50 MCG: 50 INJECTION, SOLUTION INTRAMUSCULAR; INTRAVENOUS at 09:16

## 2018-06-06 RX ADMIN — HYDROXYZINE HYDROCHLORIDE 50 MG: 50 TABLET, FILM COATED ORAL at 08:32

## 2018-06-06 RX ADMIN — HYDROMORPHONE HYDROCHLORIDE 0.5 MG: 1 INJECTION, SOLUTION INTRAMUSCULAR; INTRAVENOUS; SUBCUTANEOUS at 12:37

## 2018-06-06 RX ADMIN — ACETAMINOPHEN 1000 MG: 500 TABLET, FILM COATED ORAL at 08:33

## 2018-06-06 RX ADMIN — MIDAZOLAM 2 MG: 1 INJECTION INTRAMUSCULAR; INTRAVENOUS at 09:15

## 2018-06-06 RX ADMIN — BUPIVACAINE HYDROCHLORIDE 30 ML: 2.5 INJECTION, SOLUTION EPIDURAL; INFILTRATION; INTRACAUDAL; PERINEURAL at 11:35

## 2018-06-06 RX ADMIN — PHENYLEPHRINE HYDROCHLORIDE 100 MCG: 10 INJECTION, SOLUTION INTRAMUSCULAR; INTRAVENOUS; SUBCUTANEOUS at 09:34

## 2018-06-06 RX ADMIN — SODIUM CHLORIDE, POTASSIUM CHLORIDE, SODIUM LACTATE AND CALCIUM CHLORIDE: 600; 310; 30; 20 INJECTION, SOLUTION INTRAVENOUS at 09:15

## 2018-06-06 RX ADMIN — PHENYLEPHRINE HYDROCHLORIDE 100 MCG: 10 INJECTION, SOLUTION INTRAMUSCULAR; INTRAVENOUS; SUBCUTANEOUS at 09:55

## 2018-06-06 RX ADMIN — CEFAZOLIN SODIUM 2 G: 2 INJECTION, SOLUTION INTRAVENOUS at 09:20

## 2018-06-06 RX ADMIN — CEFAZOLIN 1 G: 1 INJECTION, POWDER, FOR SOLUTION INTRAMUSCULAR; INTRAVENOUS at 11:20

## 2018-06-06 RX ADMIN — PHENYLEPHRINE HYDROCHLORIDE 100 MCG: 10 INJECTION, SOLUTION INTRAMUSCULAR; INTRAVENOUS; SUBCUTANEOUS at 09:20

## 2018-06-06 RX ADMIN — LIDOCAINE HYDROCHLORIDE 60 MG: 20 INJECTION, SOLUTION INFILTRATION; PERINEURAL at 09:16

## 2018-06-06 RX ADMIN — SODIUM CHLORIDE, POTASSIUM CHLORIDE, SODIUM LACTATE AND CALCIUM CHLORIDE: 600; 310; 30; 20 INJECTION, SOLUTION INTRAVENOUS at 10:09

## 2018-06-06 RX ADMIN — FENTANYL CITRATE 25 MCG: 50 INJECTION, SOLUTION INTRAMUSCULAR; INTRAVENOUS at 09:51

## 2018-06-06 RX ADMIN — HYDROCODONE BITARTRATE AND ACETAMINOPHEN 1 TABLET: 5; 325 TABLET ORAL at 13:28

## 2018-06-06 RX ADMIN — PROPOFOL 200 MCG/KG/MIN: 10 INJECTION, EMULSION INTRAVENOUS at 09:19

## 2018-06-06 RX ADMIN — FENTANYL CITRATE 25 MCG: 50 INJECTION, SOLUTION INTRAMUSCULAR; INTRAVENOUS at 11:39

## 2018-06-06 RX ADMIN — PHENYLEPHRINE HYDROCHLORIDE 100 MCG: 10 INJECTION, SOLUTION INTRAMUSCULAR; INTRAVENOUS; SUBCUTANEOUS at 09:27

## 2018-06-06 NOTE — ANESTHESIA CARE TRANSFER NOTE
Patient: Pam Gaviria    Procedure(s):  MAMMOPLASTY REDUCTION BILATERAL  - Wound Class: I-Clean    Diagnosis: MACROMASTIA   Diagnosis Additional Information: No value filed.    Anesthesia Type:   General, LMA     Note:  Airway :Face Mask  Patient transferred to:PACU  Comments: Transferred to PACU, spontaneous respirations, 10L oxygen via facemask.  All monitors and alarms on and functioning, VSS.  Patient awake, comfortable.  Report to PACU RN.Handoff Report: Identifed the Patient, Identified the Reponsible Provider, Reviewed the pertinent medical history, Discussed the surgical course, Reviewed Intra-OP anesthesia mangement and issues during anesthesia, Set expectations for post-procedure period and Allowed opportunity for questions and acknowledgement of understanding      Vitals: (Last set prior to Anesthesia Care Transfer)    CRNA VITALS  6/6/2018 1133 - 6/6/2018 1206      6/6/2018             Pulse: 71    SpO2: 100 %    Resp Rate (observed): 17                Electronically Signed By: JOSEFINA Patricio CRNA  June 6, 2018  12:06 PM

## 2018-06-06 NOTE — ANESTHESIA PREPROCEDURE EVALUATION
Anesthesia Evaluation     . Pt has not had prior anesthetic            ROS/MED HX    ENT/Pulmonary:     (+)allergic rhinitis, Moderate Persistent asthma , . .    Neurologic:       Cardiovascular:  - neg cardiovascular ROS       METS/Exercise Tolerance:     Hematologic:         Musculoskeletal:         GI/Hepatic:  - neg GI/hepatic ROS       Renal/Genitourinary:         Endo:     (+) Obesity, .      Psychiatric:         Infectious Disease: Comment: Latent TB  (+) TB,       Malignancy:         Other:                     Physical Exam  Normal systems: cardiovascular, pulmonary and dental    Airway   Mallampati: II  TM distance: >3 FB  Neck ROM: full    Dental     Cardiovascular   Rhythm and rate: regular and normal      Pulmonary    breath sounds clear to auscultation                    Anesthesia Plan      History & Physical Review  History and physical reviewed and following examination; no interval change.    ASA Status:  3 .    NPO Status:  > 8 hours    Plan for General and LMA with Propofol induction. Maintenance will be TIVA.    PONV prophylaxis:  Ondansetron (or other 5HT-3) and Dexamethasone or Solumedrol  Possible ETT      Postoperative Care  Postoperative pain management:  IV analgesics and Oral pain medications.      Consents  Anesthetic plan, risks, benefits and alternatives discussed with:  Patient..                          .

## 2018-06-06 NOTE — OP NOTE
PLASTIC SURGERY OPERATIVE NOTE  Patient Name:  Pam Gaviria     Date of Service:  6/6/2018     PREOPERATIVE DIAGNOSES:   1.  MACROMASTIA       POSTOPERATIVE DIAGNOSES:   1.  MACROMASTIA      SURGEON:  Maru Nguyen MD   OR STAFF:  Circulator: Makeda Mckeon RN  Relief Circulator: Rashida Fitzgerald RN; Mary Montiel RN  Relief Scrub: Jeffrey Veronica  Scrub Person: Alma Giles     Anesthesia:  General     Estimated blood loss:  * No blood loss amount entered *   SPECIMEN(S):    ID Type Source Tests Collected by Time Destination   A : Left breast tissue-  (Weight= 830 Grams) Tissue Breast, Left SURGICAL PATHOLOGY EXAM Maru Nguyen MD 6/6/2018 10:35 AM    B : Right breast tissue-  (Weight= 905grams) Tissue Breast, Right SURGICAL PATHOLOGY EXAM Maru Nguyen MD 6/6/2018 11:40 AM         PROCEDURE:  Bilateral breast reduction     DESCRIPTION OF PROCEDURE:  Pam Gaviria was marked for incision and taken to the operating room.  General anesthesia was administered.  The chest area was prepped and draped in a sterile manner.  On the left side, the nipple areolar complex was resized to 42 mm and medial pedicle was designed with an 8 cm base.  This area was de-epithelialized with the breast under tension.  Remaining skin incisions were made.  Excess breast tissue was removed from the inferior and lateral aspects of the breast.  The medial pedicle was better defined. The breast was reapproximated placing a pillar suture of 0 PDS 10 cm from the apex.  Excess subcutaneous tissue and breast tissue was removed from along the inframammary fold.  Hemostasis was achieved.      The patient was brought to the upright position and excess skin along the inframammary fold was marked. The new nipple areola complex position was marked.  The patient was return to the supine position.     On the left side, the excess skin along the inframammary fold was sharply excised and hemostasis was  achieved.  The incision was reapproximated with interrupted 3-0 Monocryl in the subcutaneous tissue followed by a running 4-0 subcuticular Monocryl suture.  The nipple areola complex was inset into a 38 mm opening.  The nipple areola complex was matured using interrupted 4-0 monocryl followed by a running 4-0 barbed suture. The incision was then completely closed with a running 5-0 PDS suture.    On the right side, the nipple areolar complex was resized to 42 mm and medial pedicle was designed with an 8 cm base.  This area was de-epithelialized with the breast under tension.  Remaining skin incisions were made.  Excess breast tissue was removed from the inferior and lateral aspects of the breast.  The medial pedicle was better defined. The breast was reapproximated placing a pillar suture of 0 PDS 10 cm from the apex.  Excess subcutaneous tissue and breast tissue was removed from along the inframammary fold.  Hemostasis was achieved.      The patient was brought to the upright position and excess skin along the inframammary fold was marked. The new nipple areola complex position was marked.  The patient was return to the supine position.     On the right side, the excess skin along the inframammary fold was sharply excised and hemostasis was achieved.  The incision was reapproximated with interrupted 3-0 Monocryl in the subcutaneous tissue followed by a running 4-0 subcuticular Monocryl suture.  The nipple areola complex was inset into a 38 mm opening.  The nipple areola complex was matured using interrupted 4-0 monocryl followed by a running 4-0 barbed suture. The incision was then completely closed with a running 5-0 PDS suture.    The incisions were dressing with xeroform and a light gauze and secured with an ace wrap.     Breast tissue from the left breast weighed 830 grams and from the right breast 905 grams.      Maru Nguyen MD  Plastic Surgery  [facility]  [phone] (office)

## 2018-06-06 NOTE — DISCHARGE INSTRUCTIONS
Today you were given 1000 mg of Tylenol at 0833am . The recommended daily maximum dose is 4000 mg.     Same Day Surgery Discharge Instructions for  Sedation and General Anesthesia       It's not unusual to feel dizzy, light-headed or faint for up to 24 hours after surgery or while taking pain medication.  If you have these symptoms: sit for a few minutes before standing and have someone assist you when you get up to walk or use the bathroom.      You should rest and relax for the next 24 hours. We recommend you make arrangements to have an adult stay with you for at least 24 hours after your discharge.  Avoid hazardous and strenuous activity.      DO NOT DRIVE any vehicle or operate mechanical equipment for 24 hours following the end of your surgery.  Even though you may feel normal, your reactions may be affected by the medication you have received.      Do not drink alcoholic beverages for 24 hours following surgery.       Slowly progress to your regular diet as you feel able. It's not unusual to feel nauseated and/or vomit after receiving anesthesia.  If you develop these symptoms, drink clear liquids (apple juice, ginger ale, broth, 7-up, etc. ) until you feel better.  If your nausea and vomiting persists for 24 hours, please notify your surgeon.        All narcotic pain medications, along with inactivity and anesthesia, can cause constipation. Drinking plenty of liquids and increasing fiber intake will help.      For any questions of a medical nature, call your surgeon.      Do not make important decisions for 24 hours.      If you had general anesthesia, you may have a sore throat for a couple of days related to the breathing tube used during surgery.  You may use Cepacol lozenges to help with this discomfort.  If it worsens or if you develop a fever, contact your surgeon.       If you feel your pain is not well managed with the pain medications prescribed by your surgeon, please contact your surgeon's office  to let them know so they can address your concerns.     Discharge Instructions following Breast Surgery  [facility]    Diet:   Resume diet as tolerated.  Drink plenty of fluids to prevent constipation.    Activity:   Gentle rotation & stretching of your arms/shoulders will prevent stiffness in joints   Increase activity gradually   No heavy lifting greater than 10-15 pounds & no strenuous activity until  approved by surgeon    Bathing/Incision Care:   You may shower as directed by surgeon   Pat incisions dry.  No lotions, powders or perfumes to incisions   Tape dressings (steri strips) will fall off in 7-10 days (if present)    What to expect:   A tingly or itchy sensation around the incision is a normal sign of healing   Some clear, pink drainage from incisions is normal.      Notify your surgeon for the following signs & symptoms:   Redness, warmth, or swelling of the incision    Foul smelling or increased drainage   Chills or temperature greater than 101 F   Pain not controlled by pain medications    **If you have questions or concerns about your procedure,  call  at [phone]**

## 2018-06-06 NOTE — IP AVS SNAPSHOT
[facility] Same Day Surgery    [address]    [place] [address]    Phone:  [phone]    Fax:  [phone]                                       After Visit Summary   6/6/2018    Pam Gaviria    MRN: [id number]           After Visit Summary Signature Page     I have received my discharge instructions, and my questions have been answered. I have discussed any challenges I see with this plan with the nurse or doctor.    ..........................................................................................................................................  Patient/Patient Representative Signature      ..........................................................................................................................................  Patient Representative Print Name and Relationship to Patient    ..................................................               ................................................  Date                                            Time    ..........................................................................................................................................  Reviewed by Signature/Title    ...................................................              ..............................................  Date                                                            Time

## 2018-06-06 NOTE — ANESTHESIA POSTPROCEDURE EVALUATION
Patient: Pam Gaviria    Procedure(s):  MAMMOPLASTY REDUCTION BILATERAL  - Wound Class: I-Clean    Diagnosis:MACROMASTIA   Diagnosis Additional Information: No value filed.    Anesthesia Type:  General, LMA    Note:  Anesthesia Post Evaluation    Patient location during evaluation: PACU  Patient participation: Able to fully participate in evaluation  Level of consciousness: awake  Pain management: adequate  Airway patency: patent  Cardiovascular status: acceptable  Respiratory status: acceptable  Hydration status: acceptable  PONV: none     Anesthetic complications: None          Last vitals:  Vitals:    06/06/18 1300 06/06/18 1315 06/06/18 1330   BP: 112/85 126/82 124/84   Resp: 28 26 28   Temp:      SpO2: 94% 96% 97%         Electronically Signed By: Solis Alanis MD  June 6, 2018  1:55 PM

## 2018-06-06 NOTE — IP AVS SNAPSHOT
MRN:[id number]                      After Visit Summary   6/6/2018    Pam Gaviria    MRN: [id number]           Thank you!     Thank you for choosing [facility] for your care. Our goal is always to provide you with excellent care. Hearing back from our patients is one way we can continue to improve our services. Please take a few minutes to complete the written survey that you may receive in the mail after you visit with us. Thank you!        Patient Information     Date Of Birth          1970        About your hospital stay     You were admitted on:  June 6, 2018 [facility]    You were discharged on:  June 6, 2018        Reason for your hospital stay       Surgery.                  Who to Call     For medical emergencies, please call 911.  For non-urgent questions about your medical care, please call your primary care provider or clinic, [phone]  For questions related to your surgery, please call your surgery clinic        Attending Provider     Provider Maru Leggett MD Plastic Surgery       Primary Care Provider Office Phone # Fax #    JOSEFINA Horne [facility] [phone]      After Care Instructions     Discharge Instructions       Follow up with Dr. Nguyen in 1-2 weeks.            Wound care and dressings       Remove dressing 24-48 hours after surgery.  Then may shower with incisions uncovered. Apply Aquaphor to incisions daily. No restrictions on arm movements. Avoid heavy lifting or strenuous exercise for 2 weeks. Camisole/soft bra prn comfort.  May use OTC ibuprofen/tylenol for discomfort.                  Your next 10 appointments already scheduled     Jun 14, 2018  1:50 PM CDT   [facility] Ext[facility] with [facility] ([facility] [place]  )    [address] [address] [address]   [phone]              Further instructions from your care team        Today you were given 1000 mg of Tylenol at 0833am . The recommended daily maximum dose is 4000 mg.     Same Day Surgery Discharge Instructions for  Sedation and General Anesthesia       It's not unusual to feel dizzy, light-headed or faint for up to 24 hours after surgery or while taking pain medication.  If you have these symptoms: sit for a few minutes before standing and have someone assist you when you get up to walk or use the bathroom.      You should rest and relax for the next 24 hours. We recommend you make arrangements to have an adult stay with you for at least 24 hours after your discharge.  Avoid hazardous and strenuous activity.      DO NOT DRIVE any vehicle or operate mechanical equipment for 24 hours following the end of your surgery.  Even though you may feel normal, your reactions may be affected by the medication you have received.      Do not drink alcoholic beverages for 24 hours following surgery.       Slowly progress to your regular diet as you feel able. It's not unusual to feel nauseated and/or vomit after receiving anesthesia.  If you develop these symptoms, drink clear liquids (apple juice, ginger ale, broth, 7-up, etc. ) until you feel better.  If your nausea and vomiting persists for 24 hours, please notify your surgeon.        All narcotic pain medications, along with inactivity and anesthesia, can cause constipation. Drinking plenty of liquids and increasing fiber intake will help.      For any questions of a medical nature, call your surgeon.      Do not make important decisions for 24 hours.      If you had general anesthesia, you may have a sore throat for a couple of days related to the breathing tube used during surgery.  You may use Cepacol lozenges to help with this discomfort.  If it worsens or if you develop a fever, contact your surgeon.       If you feel your pain is not well managed with the pain medications prescribed by your surgeon, please contact your surgeon's  "office to let them know so they can address your concerns.     Discharge Instructions following Breast Surgery  [facility] Same Day Surgery    Diet:   Resume diet as tolerated.  Drink plenty of fluids to prevent constipation.    Activity:   Gentle rotation & stretching of your arms/shoulders will prevent stiffness in joints   Increase activity gradually   No heavy lifting greater than 10-15 pounds & no strenuous activity until  approved by surgeon    Bathing/Incision Care:   You may shower as directed by surgeon   Pat incisions dry.  No lotions, powders or perfumes to incisions   Tape dressings (steri strips) will fall off in 7-10 days (if present)    What to expect:   A tingly or itchy sensation around the incision is a normal sign of healing   Some clear, pink drainage from incisions is normal.      Notify your surgeon for the following signs & symptoms:   Redness, warmth, or swelling of the incision    Foul smelling or increased drainage   Chills or temperature greater than 101 F   Pain not controlled by pain medications    **If you have questions or concerns about your procedure,  call  at [phone]**    Pending Results     Date and Time Order Name Status Description    6/6/2018 1035 Surgical pathology exam In process             Admission Information     Date & Time Provider Department Dept. Phone    6/6/2018 Maru Nguyen MD [facility] Same Day Surgery [phone]      Your Vitals Were     Blood Pressure Temperature Respirations Height Weight Last Period    126/82 97.4  F (36.3  C) (Temporal) 26 1.6 m (5' 3\") 100.2 kg (220 lb 12.8 oz) 05/16/2018 (Approximate)    Pulse Oximetry BMI (Body Mass Index)                96% 39.11 kg/m2          [employer] Information     [employer] gives you secure access to your electronic health record. If you see a primary care provider, you can also send messages to your care team and make appointments. If you have questions, please call your " primary care clinic.  If you do not have a primary care provider, please call [phone] and they will assist you.        Care EveryWhere ID     This is your Care EveryWhere ID. This could be used by other organizations to access your [facility] medical records  [id number]        Equal Access to Services     VANIA PATTON : [place], waaxda luqadaha, qaybta kaalmada kangyaboy, yi joneslebronsnow aquino. So [facility] [phone].    ATENCIÓN: Si habla español, tiene a pedraza disposición servicios gratuitos de asistencia lingüística. Yohana al [phone].    We comply with applicable federal civil rights laws and [place] laws. We do not discriminate on the basis of race, color, national origin, age, disability, sex, sexual orientation, or gender identity.               Review of your medicines      START taking        Dose / Directions    HYDROcodone-acetaminophen 5-325 MG per tablet   Commonly known as:  NORCO   Used for:  Hypertrophy of breast        Dose:  1-2 tablet   Take 1-2 tablets by mouth every 4 hours as needed for pain maximum 10 tablet(s) per day   Quantity:  20 tablet   Refills:  0         CONTINUE these medicines which have NOT CHANGED        Dose / Directions    albuterol 108 (90 Base) MCG/ACT Inhaler   Commonly known as:  PROAIR HFA/PROVENTIL HFA/VENTOLIN HFA        Dose:  2 puff   Inhale 2 puffs into the lungs every 6 hours as needed for shortness of breath / dyspnea or wheezing   Quantity:  1 Inhaler   Refills:  11       azelastine 0.1 % spray   Commonly known as:  ASTELIN        Dose:  1-2 spray   Spray 1-2 sprays into both nostrils 2 times daily   Quantity:  1 Bottle   Refills:  2       budesonide-formoterol 160-4.5 MCG/ACT Inhaler   Commonly known as:  SYMBICORT        Dose:  2 puff   Inhale 2 puffs into the lungs 2 times daily   Quantity:  1 Inhaler   Refills:  11       cetirizine 10 MG tablet   Commonly known as:  zyrTEC        Dose:  10 mg   Take 10 mg by mouth  daily   Refills:  0       etonogestrel 68 MG Impl   Commonly known as:  IMPLANON/NEXPLANON   Used for:  Nexplanon insertion        Dose:  1 each   1 each (68 mg) by Subdermal route once for 1 dose   Quantity:  1 each   Refills:  0       EYE DROPS ALLERGY RELIEF OP        Refills:  0       fluticasone 50 MCG/ACT spray   Commonly known as:  FLONASE   Used for:  Chronic allergic rhinitis, unspecified seasonality, unspecified trigger        Dose:  2 spray   Spray 2 sprays into both nostrils daily   Quantity:  1 Bottle   Refills:  11       montelukast 10 MG tablet   Commonly known as:  SINGULAIR   Used for:  Chronic allergic rhinitis, unspecified seasonality, unspecified trigger        Dose:  10 mg   Take 1 tablet (10 mg) by mouth At Bedtime   Quantity:  90 tablet   Refills:  3       phentermine 37.5 MG tablet   Commonly known as:  ADIPEX-P   Used for:  BMI 45.0-49.9, adult (H), Morbid obesity (H)        Dose:  37.5 mg   Take 1 tablet (37.5 mg) by mouth every morning (before breakfast)   Quantity:  32 tablet   Refills:  2       * UNABLE TO FIND        MEDICATION NAME: Move Fee Ultra   Refills:  0       * UNABLE TO FIND        MEDICATION NAME: Hair Skin and Nails supplement   Refills:  0       * UNABLE TO FIND        MEDICATION NAME: Women's Ulta Maur[employer] Energy and Metabolism supplement   Refills:  0       * Notice:  This list has 3 medication(s) that are the same as other medications prescribed for you. Read the directions carefully, and ask your doctor or other care provider to review them with you.         Where to get your medicines      Some of these will need a paper prescription and others can be bought over the counter. Ask your nurse if you have questions.     Bring a paper prescription for each of these medications     HYDROcodone-acetaminophen 5-325 MG per tablet                Protect others around you: Learn how to safely use, store and throw away your medicines at www.disposemymeds.org.        Information about  OPIOIDS     PRESCRIPTION OPIOIDS: WHAT YOU NEED TO KNOW   You have a prescription for an opioid (narcotic) pain medicine. Opioids can cause addiction. If you have a history of chemical dependency of any type, you are at a higher risk of becoming addicted to opioids. Only take this medicine after all other options have been tried. Take it for as short a time and as few doses as possible.     Do not:    Drive. If you drive while taking these medicines, you could be arrested for driving under the influence (DUI).    Operate heavy machinery    Do any other dangerous activities while taking these medicines.     Drink any alcohol while taking these medicines.      Take with any other medicines that contain acetaminophen. Read all labels carefully. Look for the word  acetaminophen  or  Tylenol.  Ask your pharmacist if you have questions or are unsure.    Store your pills in a secure place, locked if possible. We will not replace any lost or stolen medicine. If you don t finish your medicine, please throw away (dispose) as directed by your pharmacist. The [place] Pollution Control Agency has more information about safe disposal: https://www.pca.[facility].[place].us/living-green/managing-unwanted-medications    All opioids tend to cause constipation. Drink plenty of water and eat foods that have a lot of fiber, such as fruits, vegetables, prune juice, apple juice and high-fiber cereal. Take a laxative (Miralax, milk of magnesia, Colace, Senna) if you don t move your bowels at least every other day.              Medication List: This is a list of all your medications and when to take them. Check marks below indicate your daily home schedule. Keep this list as a reference.      Medications           Morning Afternoon Evening Bedtime As Needed    albuterol 108 (90 Base) MCG/ACT Inhaler   Commonly known as:  PROAIR HFA/PROVENTIL HFA/VENTOLIN HFA   Inhale 2 puffs into the lungs every 6 hours as needed for shortness of breath / dyspnea  or wheezing                                azelastine 0.1 % spray   Commonly known as:  ASTELIN   Spray 1-2 sprays into both nostrils 2 times daily                                budesonide-formoterol 160-4.5 MCG/ACT Inhaler   Commonly known as:  SYMBICORT   Inhale 2 puffs into the lungs 2 times daily                                cetirizine 10 MG tablet   Commonly known as:  zyrTEC   Take 10 mg by mouth daily                                etonogestrel 68 MG Impl   Commonly known as:  IMPLANON/NEXPLANON   1 each (68 mg) by Subdermal route once for 1 dose                                EYE DROPS ALLERGY RELIEF OP                                fluticasone 50 MCG/ACT spray   Commonly known as:  FLONASE   Spray 2 sprays into both nostrils daily                                HYDROcodone-acetaminophen 5-325 MG per tablet   Commonly known as:  NORCO   Take 1-2 tablets by mouth every 4 hours as needed for pain maximum 10 tablet(s) per day   Last time this was given:  1 tablet on 6/6/2018  1:28 PM   Last time this was given:  One tablet taken at 1:28                                montelukast 10 MG tablet   Commonly known as:  SINGULAIR   Take 1 tablet (10 mg) by mouth At Bedtime                                phentermine 37.5 MG tablet   Commonly known as:  ADIPEX-P   Take 1 tablet (37.5 mg) by mouth every morning (before breakfast)                                * UNABLE TO FIND   MEDICATION NAME: Move Fee Ultra                                * UNABLE TO FIND   MEDICATION NAME: Hair Skin and Nails supplement                                * UNABLE TO FIND   MEDICATION NAME: Women's Ulta Maurilio Energy and Metabolism supplement                                * Notice:  This list has 3 medication(s) that are the same as other medications prescribed for you. Read the directions carefully, and ask your doctor or other care provider to review them with you.

## 2018-06-07 LAB — COPATH REPORT: NORMAL

## 2018-06-12 ENCOUNTER — MYC MEDICAL ADVICE (OUTPATIENT)
Dept: ENDOCRINOLOGY | Facility: CLINIC | Age: 48
End: 2018-06-12

## 2018-06-12 DIAGNOSIS — E66.01 MORBID OBESITY (H): ICD-10-CM

## 2018-06-14 ENCOUNTER — THERAPY VISIT (OUTPATIENT)
Dept: PHYSICAL THERAPY | Facility: CLINIC | Age: 48
End: 2018-06-14
Payer: COMMERCIAL

## 2018-06-14 DIAGNOSIS — M25.561 ACUTE PAIN OF RIGHT KNEE: ICD-10-CM

## 2018-06-14 PROCEDURE — 97140 MANUAL THERAPY 1/> REGIONS: CPT | Mod: GP | Performed by: PHYSICAL THERAPY ASSISTANT

## 2018-06-14 PROCEDURE — 97110 THERAPEUTIC EXERCISES: CPT | Mod: GP | Performed by: PHYSICAL THERAPY ASSISTANT

## 2018-06-14 PROCEDURE — 97112 NEUROMUSCULAR REEDUCATION: CPT | Mod: GP | Performed by: PHYSICAL THERAPY ASSISTANT

## 2018-06-14 RX ORDER — PHENTERMINE HYDROCHLORIDE 37.5 MG/1
37.5 TABLET ORAL
Qty: 32 TABLET | Refills: 2 | Status: SHIPPED | OUTPATIENT
Start: 2018-06-14 | End: 2018-08-16

## 2018-06-14 ASSESSMENT — ACTIVITIES OF DAILY LIVING (ADL)
HOW_WOULD_YOU_RATE_THE_OVERALL_FUNCTION_OF_YOUR_KNEE_DURING_YOUR_USUAL_DAILY_ACTIVITIES?: NEARLY NORMAL
STIFFNESS: I HAVE THE SYMPTOM BUT IT DOES NOT AFFECT MY ACTIVITY
SIT WITH YOUR KNEE BENT: ACTIVITY IS SOMEWHAT DIFFICULT
GO UP STAIRS: ACTIVITY IS NOT DIFFICULT
WEAKNESS: I DO NOT HAVE THE SYMPTOM
KNEEL ON THE FRONT OF YOUR KNEE: I AM UNABLE TO DO THE ACTIVITY
WALK: ACTIVITY IS NOT DIFFICULT
GIVING WAY, BUCKLING OR SHIFTING OF KNEE: I DO NOT HAVE THE SYMPTOM
STAND: ACTIVITY IS NOT DIFFICULT
RISE FROM A CHAIR: ACTIVITY IS NOT DIFFICULT
KNEE_ACTIVITY_OF_DAILY_LIVING_SCORE: 81.43
SWELLING: I DO NOT HAVE THE SYMPTOM
LIMPING: I DO NOT HAVE THE SYMPTOM
KNEE_ACTIVITY_OF_DAILY_LIVING_SUM: 57
AS_A_RESULT_OF_YOUR_KNEE_INJURY,_HOW_WOULD_YOU_RATE_YOUR_CURRENT_LEVEL_OF_DAILY_ACTIVITY?: NEARLY NORMAL
PAIN: I HAVE THE SYMPTOM BUT IT DOES NOT AFFECT MY ACTIVITY
HOW_WOULD_YOU_RATE_THE_CURRENT_FUNCTION_OF_YOUR_KNEE_DURING_YOUR_USUAL_DAILY_ACTIVITIES_ON_A_SCALE_FROM_0_TO_100_WITH_100_BEING_YOUR_LEVEL_OF_KNEE_FUNCTION_PRIOR_TO_YOUR_INJURY_AND_0_BEING_THE_INABILITY_TO_PERFORM_ANY_OF_YOUR_USUAL_DAILY_ACTIVITIES?: 90
SQUAT: ACTIVITY IS VERY DIFFICULT
RAW_SCORE: 57
GO DOWN STAIRS: ACTIVITY IS NOT DIFFICULT

## 2018-06-14 NOTE — TELEPHONE ENCOUNTER
Refill request approved.  Please call and find out where she wants the Rx faxed.  Please have her schedule a follow up visit in August.  Marga Nichole NP  Endocrinology

## 2018-06-14 NOTE — MR AVS SNAPSHOT
After Visit Summary   6/14/2018    Pam Gaviria    MRN: [id number]           Patient Information     Date Of Birth          1970        Visit Information        Provider Department      6/14/2018 1:50 PM Marga Berger PTA [facility]        Today's Diagnoses     Acute pain of right knee           Follow-ups after your visit        Your next 10 appointments already scheduled     Aug 16, 2018 10:00 AM CDT   Return Visit with JOSEFINA Adler CNP   [facility] ([facility]    [address] [facility] [phone]              Who to contact     If you have questions or need follow up information about today's clinic visit or your schedule please contact [facility] directly at [phone].  Normal or non-critical lab and imaging results will be communicated to you by MyChart, letter or phone within 4 business days after the clinic has received the results. If you do not hear from us within 7 days, please contact the clinic through MyChart or phone. If you have a critical or abnormal lab result, we will notify you by phone as soon as possible.  Submit refill requests through [employer] or call your pharmacy and they will forward the refill request to us. Please allow 3 business days for your refill to be completed.          Additional Information About Your Visit        MyChart Information     [employer] gives you secure access to your electronic health record. If you see a primary care provider, you can also send messages to your care team and make appointments. If you have questions, please call your primary care clinic.  If you do not have a primary care provider, please call [phone] and they will assist you.        Care EveryWhere ID     This is your Care EveryWhere ID. This could be used by other organizations to access your [facility] medical records  [id number]        Your Vitals Were     Last Period                    05/16/2018 (Approximate)            Blood Pressure from Last 3 Encounters:   06/06/18 138/78   05/15/18 106/64   05/14/18 106/58    Weight from Last 3 Encounters:   06/06/18 100.2 kg (220 lb 12.8 oz)   05/15/18 102.5 kg (226 lb)   05/14/18 102.8 kg (226 lb 9.6 oz)              We Performed the Following     Manual Ther Tech, 1+Regions, EA 15 min     Neuromuscular Re-Education     Therapeutic Exercises        Primary Care Provider Office Phone # Fax #    Jackelin Parker, APRN [facility] [phone] [address] [place] [address]        Equal Access to Services     JESSA PATTON : Hadii aad ku hadasho Sodaphnie, waaxda luqadaha, qaybta kaalmada adejanesyada, yi hernandez . So [facility] [phone].    ATENCIÓN: Si habla español, tiene a pedraza disposición servicios gratuitos de asistencia lingüística. Llame al [phone].    We comply with applicable federal civil rights laws and [place] laws. We do not discriminate on the basis of race, color, national origin, age, disability, sex, sexual orientation, or gender identity.            Thank you!     Thank you for choosing [facility]  for your care. Our goal is always to provide you with excellent care. Hearing back from our patients is one way we can continue to improve our services. Please take a few minutes to complete the written survey that you may receive in the mail after your visit with us. Thank you!             Your Updated Medication List - Protect others around you: Learn how to safely use, store and throw away your medicines at www.disposemymeds.org.          This list is accurate as of 6/14/18 11:59 PM.  Always use your most recent med list.                   Brand Name Dispense Instructions for use Diagnosis    albuterol 108 (90 Base) MCG/ACT Inhaler    PROAIR HFA/PROVENTIL HFA/VENTOLIN HFA    1 Inhaler    Inhale 2 puffs into the lungs every 6 hours as needed for shortness of breath  / dyspnea or wheezing        azelastine 0.1 % spray    ASTELIN    1 Bottle    Spray 1-2 sprays into both nostrils 2 times daily        budesonide-formoterol 160-4.5 MCG/ACT Inhaler    SYMBICORT    1 Inhaler    Inhale 2 puffs into the lungs 2 times daily        cetirizine 10 MG tablet    zyrTEC     Take 10 mg by mouth daily        EYE DROPS ALLERGY RELIEF OP           fluticasone 50 MCG/ACT spray    FLONASE    1 Bottle    Spray 2 sprays into both nostrils daily    Chronic allergic rhinitis, unspecified seasonality, unspecified trigger       HYDROcodone-acetaminophen 5-325 MG per tablet    NORCO    20 tablet    Take 1-2 tablets by mouth every 4 hours as needed for pain maximum 10 tablet(s) per day    Hypertrophy of breast       montelukast 10 MG tablet    SINGULAIR    90 tablet    Take 1 tablet (10 mg) by mouth At Bedtime    Chronic allergic rhinitis, unspecified seasonality, unspecified trigger       phentermine 37.5 MG tablet    ADIPEX-P    32 tablet    Take 1 tablet (37.5 mg) by mouth every morning (before breakfast)    BMI 45.0-49.9, adult (H), Morbid obesity (H)       * UNABLE TO FIND      MEDICATION NAME: Move Fee Ultra        * UNABLE TO FIND      MEDICATION NAME: Hair Skin and Nails supplement        * UNABLE TO FIND      MEDICATION NAME: Women's Ulta Maurilio Energy and Metabolism supplement        * Notice:  This list has 3 medication(s) that are the same as other medications prescribed for you. Read the directions carefully, and ask your doctor or other care provider to review them with you.

## 2018-06-14 NOTE — TELEPHONE ENCOUNTER
Spoke with patient.  Patient uses [facility] in [facility]   All recommendations given.  Future appointment scheduled for 8/16/18.      Prescription faxed.  Kamala Monterroso M.A.

## 2018-06-15 NOTE — PROGRESS NOTES
Subjective:  HPI       Knee Activity of Daily Living Score: 81.43            Objective:  System    Physical Exam    General     ROS    Assessment/Plan:    DISCHARGE REPORT    Progress reporting period is from 5/22/18 to 6/14/18.      SUBJECTIVE  Subjective changes noted by patient:   Patient reports that she had surgery for breast reduction last week.   Doing well.  Patient reports that she is going to [place] for the next 2 months.  Patient feels that she is feeling so much better and that she feels that therapy was a success with pain relief.  Current pain level is .  Current Pain level: 0/10 (1/10 driving )    Previous pain level was:   Initial Pain level: 5/10   Changes in function:  Yes (See Goal flowsheet attached for changes in current functional level)     Adverse reaction to treatment or activity: None     OBJECTIVE  Changes noted in objective findings:  Yes, R knee heel slide 108 pain free, 118 with pain.  Can't perform deep squat without pain.  Tape to stabilized the right knee is helpful with discussion on weaning off of the tape and be independent without the tape.  Quad strength at 5/5, hamstring at a 4/5 with some pain.

## 2018-08-16 ENCOUNTER — OFFICE VISIT (OUTPATIENT)
Dept: ENDOCRINOLOGY | Facility: CLINIC | Age: 48
End: 2018-08-16
Payer: COMMERCIAL

## 2018-08-16 VITALS
HEART RATE: 73 BPM | SYSTOLIC BLOOD PRESSURE: 104 MMHG | WEIGHT: 215.3 LBS | BODY MASS INDEX: 38.14 KG/M2 | DIASTOLIC BLOOD PRESSURE: 68 MMHG | TEMPERATURE: 98.1 F

## 2018-08-16 DIAGNOSIS — E66.01 MORBID OBESITY (H): ICD-10-CM

## 2018-08-16 PROCEDURE — 99213 OFFICE O/P EST LOW 20 MIN: CPT | Performed by: CLINICAL NURSE SPECIALIST

## 2018-08-16 RX ORDER — PHENTERMINE HYDROCHLORIDE 37.5 MG/1
37.5 TABLET ORAL
Qty: 32 TABLET | Refills: 2 | Status: SHIPPED | OUTPATIENT
Start: 2018-08-16 | End: 2019-01-08

## 2018-08-16 NOTE — MR AVS SNAPSHOT
"              After Visit Summary   8/16/2018    Pam Gaviria    MRN: [id number]           Patient Information     Date Of Birth          1970        Visit Information        Provider Department      8/16/2018 10:00 AM Marga Nichole APRN CNP [facility]        Today's Diagnoses     BMI 45.0-49.9, adult (H)        Morbid obesity (H)          Care Instructions    You have lost 40 lbs since starting!!      Vital Signs 2/6/2018 3/8/2018   Weight (LB) 255 lb 9.6 oz 243 lb   Height 5' 1.75\" 5' 2\"   BMI (Calculated) 47.23 44.54     Vital Signs 8/16/2018   Weight (LB) 215 lb 4.8 oz   Height    BMI (Calculated) 38.14     BMI 47.23-->44.54-->38.14.  Your BMI has decreased significantly!    Waist Circumference:  49\" (2/6/2018). 42.5\" (today) - you have lost 6.5\" from your waist!    Follow up in 3 months    Marga Nichole NP  Endocrinology            Follow-ups after your visit        Follow-up notes from your care team     Return in about 3 months (around 11/16/2018).      Your next 10 appointments already scheduled     Nov 21, 2018 12:00 PM CST   Return Visit with JOSEFINA Adler CNP   [facility] ([facility])    [address]  [place] [address] [phone]              Who to contact     If you have questions or need follow up information about today's clinic visit or your schedule please contact [facility] directly at [phone].  Normal or non-critical lab and imaging results will be communicated to you by MyChart, letter or phone within 4 business days after the clinic has received the results. If you do not hear from us within 7 days, please contact the clinic through MyChart or phone. If you have a critical or abnormal lab result, we will notify you by phone as soon as possible.  Submit refill requests through [employer] or call your pharmacy and they will forward the refill request to us. Please allow 3 business " days for your refill to be completed.          Additional Information About Your Visit        MyChart Information     [employer] gives you secure access to your electronic health record. If you see a primary care provider, you can also send messages to your care team and make appointments. If you have questions, please call your primary care clinic.  If you do not have a primary care provider, please call [phone] and they will assist you.        Care EveryWhere ID     This is your Care EveryWhere ID. This could be used by other organizations to access your [facility] medical records  [id number]        Your Vitals Were     Pulse Temperature Breastfeeding? BMI (Body Mass Index)          73 98.1  F (36.7  C) (Oral) No 38.14 kg/m2         Blood Pressure from Last 3 Encounters:   08/16/18 104/68   06/06/18 138/78   05/15/18 106/64    Weight from Last 3 Encounters:   08/16/18 97.7 kg (215 lb 4.8 oz)   06/06/18 100.2 kg (220 lb 12.8 oz)   05/15/18 102.5 kg (226 lb)              Today, you had the following     No orders found for display         Where to get your medicines      Some of these will need a paper prescription and others can be bought over the counter.  Ask your nurse if you have questions.     Bring a paper prescription for each of these medications     phentermine 37.5 MG tablet          Primary Care Provider Office Phone # Fax #    Jackelin Parker, JOSEFINA [facility] [phone] [address] [place] [address]        Equal Access to Services     VANIA PATTON : Hadii timothy garzono Sobarbiali, waaxda luqadaha, qaybta kaalmada yi demarco . So [facility] [phone].    ATENCIÓN: Si habla español, tiene a pedraza disposición servicios gratuitos de asistencia lingüística. Llame al [phone].    We comply with applicable federal civil rights laws and [place] laws. We do not discriminate on the basis of race, color, national origin, age, disability,  sex, sexual orientation, or gender identity.            Thank you!     Thank you for choosing [facility]  for your care. Our goal is always to provide you with excellent care. Hearing back from our patients is one way we can continue to improve our services. Please take a few minutes to complete the written survey that you may receive in the mail after your visit with us. Thank you!             Your Updated Medication List - Protect others around you: Learn how to safely use, store and throw away your medicines at www.disposemymeds.org.          This list is accurate as of 8/16/18 10:39 AM.  Always use your most recent med list.                   Brand Name Dispense Instructions for use Diagnosis    albuterol 108 (90 Base) MCG/ACT inhaler    PROAIR HFA/PROVENTIL HFA/VENTOLIN HFA    1 Inhaler    Inhale 2 puffs into the lungs every 6 hours as needed for shortness of breath / dyspnea or wheezing        azelastine 0.1 % nasal spray    ASTELIN    1 Bottle    Spray 1-2 sprays into both nostrils 2 times daily        budesonide-formoterol 160-4.5 MCG/ACT Inhaler    SYMBICORT    1 Inhaler    Inhale 2 puffs into the lungs 2 times daily        cetirizine 10 MG tablet    zyrTEC     Take 10 mg by mouth daily        etonogestrel 68 MG Impl    IMPLANON/NEXPLANON    1 each    1 each (68 mg) by Subdermal route once for 1 dose    Nexplanon insertion       EYE DROPS ALLERGY RELIEF OP           fluticasone 50 MCG/ACT spray    FLONASE    1 Bottle    Spray 2 sprays into both nostrils daily    Chronic allergic rhinitis, unspecified seasonality, unspecified trigger       HYDROcodone-acetaminophen 5-325 MG per tablet    NORCO    20 tablet    Take 1-2 tablets by mouth every 4 hours as needed for pain maximum 10 tablet(s) per day    Hypertrophy of breast       montelukast 10 MG tablet    SINGULAIR    90 tablet    Take 1 tablet (10 mg) by mouth At Bedtime    Chronic allergic rhinitis, unspecified seasonality, unspecified  trigger       phentermine 37.5 MG tablet    ADIPEX-P    32 tablet    Take 1 tablet (37.5 mg) by mouth every morning (before breakfast)    BMI 45.0-49.9, adult (H), Morbid obesity (H)       * UNABLE TO FIND      MEDICATION NAME: Move Fee Ultra        * UNABLE TO FIND      MEDICATION NAME: Hair Skin and Nails supplement        * UNABLE TO FIND      MEDICATION NAME: Women's Ulta [facility] Energy and Metabolism supplement        * Notice:  This list has 3 medication(s) that are the same as other medications prescribed for you. Read the directions carefully, and ask your doctor or other care provider to review them with you.

## 2018-08-16 NOTE — PATIENT INSTRUCTIONS
"You have lost 40 lbs since starting!!      Vital Signs 2/6/2018 3/8/2018   Weight (LB) 255 lb 9.6 oz 243 lb   Height 5' 1.75\" 5' 2\"   BMI (Calculated) 47.23 44.54     Vital Signs 8/16/2018   Weight (LB) 215 lb 4.8 oz   Height    BMI (Calculated) 38.14     BMI 47.23-->44.54-->38.14.  Your BMI has decreased significantly!    Waist Circumference:  49\" (2/6/2018). 42.5\" (today) - you have lost 6.5\" from your waist!    Follow up in 3 months    Marga Nichole NP  Endocrinology    "

## 2018-08-16 NOTE — PROGRESS NOTES
Name: Pam Gaviria  F/u for obesity (Last seen 3/8/2018).  HPI:  Pam Gaviria is a 47 year old female who presents for the managment of obesity.  Tried Nutra system - lost 40 lbs but gained it back.  Worked with a  for 2 years - lost inches but not as much weight.  Tried multiple different diets:  [place] Diet, low carb diets, etc.  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  Interested in weight loss medication.  Started Phentermine 37.5 mg qd 2018.  She is tolerating the Phentermine well, no adverse effects.    Menses: on etonogestrel so no menses, prior menses were regular  Diarrhea/Constipation:No  Changes in Hair or Skin:No  Diabetes:No  Sleep:  Sleep Apnea/Snores:Yes: snores but not all the time  Hypertension or CAD:No  Hyperlipidemia:Yes: elevated triglycerides  With low HDL  Hirsutism:No  Easy Bruising:No  Use of Steroids:No oral steroids, uses inhaler for asthma + nasal steroids for allergies  Family history of Obesity:Yes: father's side of the family  Diet:   Exercise:Yes: member of the [employer] but hasn't been going - too cold  PMH/PSH:  Past Medical History:   Diagnosis Date     Arthritis      Chronic rhinitis      Heel pain      Latent tuberculosis 2015    chronically postive TB test     Obese      Uncomplicated asthma     moderate persistent asthma     Past Surgical History:   Procedure Laterality Date      SECTION      times 2     CHOLECYSTECTOMY       MAMMOPLASTY REDUCTION BILATERAL Bilateral 2018    Procedure: MAMMOPLASTY REDUCTION BILATERAL;  MAMMOPLASTY REDUCTION BILATERAL ;  Surgeon: Maru Nguyen MD;  Location: [facility]     UPPER GI ENDOSCOPY       Family Hx:  Family History   Problem Relation Age of Onset     Thyroid Disease Mother      Heart Defect Mother      mitral valve prolapse     Thyroid disease: Yes: mother with  hypothyroidism         DM2: No         Autoimmune: DM1, SLE, RA, Vitiligo No    Social Hx:  Social History     Social History     Marital status:      Spouse name: N/A     Number of children: N/A     Years of education: N/A     Occupational History     Not on file.     Social History Main Topics     Smoking status: Never Smoker     Smokeless tobacco: Never Used     Alcohol use Yes      Comment: 1 time evry 3 months, 1 per time     Drug use: No     Sexual activity: Yes     Partners: Male     Birth control/ protection: Inserts/Ring     Other Topics Concern     Not on file     Social History Narrative          MEDICATIONS:  has a current medication list which includes the following prescription(s): albuterol, azelastine, budesonide-formoterol, cetirizine, fluticasone, hydrocodone-acetaminophen, montelukast, phentermine, tetrahydrozoline-zn sulfate, UNABLE TO FIND, UNABLE TO FIND, UNABLE TO FIND, and etonogestrel.    ROS   ROS: 10 point ROS neg other than the symptoms noted above in the HPI.    GENERAL: no weight gain, fevers, chills, malaise, night sweats.   HEENT: no dysphagia, odynophagia, diplopia, neck pain or tenderness  CV: no chest pain, pressure, palpitations, skipped beats, LOC  LUNGS: no SOB, CASTRO, cough, sputum production, wheezing   ABDOMEN: no diarrhea, constipation, abdominal pain  EXTREMITIES: no rashes, ulcers, edema  NEUROLOGY: no changes in vision, tingling or numbness in hands or feet.   MSK: no muscle aches or pains, weakness  SKIN: no rashes or lesions  ENDOCRINE: no heat or cold intolerance      Physical Exam   VS: /68 (BP Location: Left arm, Patient Position: Chair, Cuff Size: Adult Large)  Pulse 73  Temp 98.1  F (36.7  C) (Oral)  Wt 97.7 kg (215 lb 4.8 oz)  Breastfeeding? No  BMI 38.14 kg/m2  GENERAL: NAD, well dressed, answering questions appropriately, appears stated age.  HEENT: OP clear, no exophthalmos, no proptosis, EOMI, no lig lag, no retraction, no scleral  "icterus  RESPIRATORY: Clear. Normal respiratory effort.  CARDIOVASCULAR: RRR.  No peripheral edema.  EXTREMITIES: no edema, +pulses, no rashes, no lesions  NEUROLOGY: CN grossly intact, no tremors  MSK: grossly intact, No digital cyanosis. Normal gait and station.  SKIN: no rashes, no lesions, no ulcers  PSYCH: Intact judgment and insight. A&OX3 with a cordial affect.    LABS:   !COMPREHENSIVE Latest Ref Rng & Units 2/6/2018   SODIUM 133 - 144 mmol/L 140   POTASSIUM 3.4 - 5.3 mmol/L 4.5   CHLORIDE 94 - 109 mmol/L 107   BUN 7 - 30 mg/dL 10   Creatinine 0.52 - 1.04 mg/dL 0.90   Glucose 70 - 99 mg/dL 78   ANION GAP 3 - 14 mmol/L 7   CALCIUM 8.5 - 10.1 mg/dL 8.7     Component    Latest Ref Rng & Units 5/15/2017   Cholesterol    <200 mg/dL 195   Triglycerides    <150 mg/dL 239 (H)   HDL Cholesterol    >49 mg/dL 45 (L)   LDL Cholesterol Calculated    <100 mg/dL 102 (H)   Non HDL Cholesterol    <130 mg/dL 150 (H)     !LIPID/HEPATIC Latest Ref Rng & Units 2/6/2018   AST 0 - 45 U/L 18   ALT 0 - 50 U/L 22     !THYROID Latest Ref Rng & Units 2/6/2018   TSH 0.40 - 4.00 mU/L 2.79     Vital Signs 2/6/2018 3/8/2018   Weight (LB) 255 lb 9.6 oz 243 lb   Height 5' 1.75\" 5' 2\"   BMI (Calculated) 47.23 44.54     Vital Signs 8/16/2018   Weight (LB) 215 lb 4.8 oz   Height    BMI (Calculated) 38.14      Waist Circumference:  49\" (2/6/2018). 42.5\" (today)    All pertinent notes, labs, and images personally reviewed by me.     A/P  Ms.Vanessa Gaviria is a 47 year old here for the evaluation of obesity:    1. Morbid Obesity-  BMI 47.23-->44.54-->38.14. Comorbidities - hyperlipidemia.  Obesity is associated with a significant increase in mortality and risk of many disorders, including diabetes mellitus, hypertension, dyslipidemia, heart disease, stroke, sleep apnea, cancer, and many others. Conversely, weight loss is associated with a reduction in obesity-associated morbidity.    Endocrine evaluation of obesity includes Diabetes/prediabetes, " Cushing's and thyroid dysfunction.  The relevant work up for the diagnosis and management of obesity and various treatment options were discussed. Body Mass Index (BMI) has been a standard means for calculating risk for overweight and obesity. The new American Association of Clinical Endocrinology (AACE) algorithm recommends lifestyle modifications as the initial phase of treatment for all patients with the BMI equal or greater than 25 kg/m2. Lifestyle modifications includes use of medical nutrition therapy, exercise, tobacco cessation, adequate quality and quantity of sleep, limited consumption of alcohol and reduced stress through implementation of a structured, multidisciplinary program.    In patients with complications associated with obesity, graded interventions are recommended including pharmacological therapy such as phentermine, orlistat, lorcaserin and phentermine/topiramate ER, contrave ( buproprion/naltreone) and the use of very low calorie meal replacement programs.    If medical intervention is insufficient, surgical therapy may be considered, especially in patients with BMI greater than or equal to 35 kg/m2 with multiple complications. Surgical treatments include lap-band, gastric sleeve or gastric bypass surgery.    I informed the pt that:  1.Weight loss medications can be taken to assist with weight reduction when combined with appropriate healthy lifestyle changes.  2.I discussed possible s/e, risks and benefits of weight loss medications.  3.All medications are FDA approved, however, some may be used ''off label'' for their weight loss benefits and some ''short term'' medications can be used for longer periods to achieve desired clinical outcomes.  4.All patients taking weight loss medications must be seen in clinic for refill authorization.    Counseling exercise ( V65.41) - start a regular exercise program  Dietary counseling( V65.3) - Recommend [id number] gali/day - eating about 1450  gali/day.  Calculated BMI above the upper parameter and f/u plan was documented in the medical record.  Discussed indications, risks and benefits of all medications prescribed, and answered questions to patient's satisfaction.  Started Phentermine 37.5 mg 2/6/2018.  Has lost 40 lbs in the since starting Phentermine + diet and exercise efforts.255-->215 lbs  Continue Phentermine 37.5 mg qd + diet efforts.  Short term weight loss goal 5% of her current weight in the next 3 months = 12 lbs or approximately 4 lbs per month.    Labs ordered today:   No orders of the defined types were placed in this encounter.    Radiology/Consults ordered today: None    More than 50% of the time spent with Ms. Gaviria on counseling / coordinating her care.  Total face to face time was greater than or equal to 20 minutes.      Follow-up:  follow up in 3 month(s)    Marga Nichole NP  Endocrinology  [facility]  CC: Jackelin Parker   ____________________________________________________________

## 2018-08-16 NOTE — LETTER
2018         RE: Pam Gaviria  [address]  [place] [address]        Dear Colleague,    Thank you for referring your patient, Pam Gaviria, to the [facility]. Please see a copy of my visit note below.    Name: Pam Gaviria  F/u for obesity (Last seen 3/8/2018).  HPI:  Pam Gaviria is a 47 year old female who presents for the managment of obesity.  Tried Nutra system - lost 40 lbs but gained it back.  Worked with a  for 2 years - lost inches but not as much weight.  Tried multiple different diets:  [place] Diet, low carb diets, etc.  One of her biggest challenges is meal planning - her boys compete in [employer] and they travel nearly every weekend - eats most meals at fast food restaurants.  Interested in weight loss medication.  Started Phentermine 37.5 mg qd 2018.  She is tolerating the Phentermine well, no adverse effects.    Menses: on etonogestrel so no menses, prior menses were regular  Diarrhea/Constipation:No  Changes in Hair or Skin:No  Diabetes:No  Sleep:  Sleep Apnea/Snores:Yes: snores but not all the time  Hypertension or CAD:No  Hyperlipidemia:Yes: elevated triglycerides  With low HDL  Hirsutism:No  Easy Bruising:No  Use of Steroids:No oral steroids, uses inhaler for asthma + nasal steroids for allergies  Family history of Obesity:Yes: father's side of the family  Diet:   Exercise:Yes: member of the [employer] but hasn't been going - too cold  PMH/PSH:  Past Medical History:   Diagnosis Date     Arthritis      Chronic rhinitis      Heel pain      Latent tuberculosis 2015    chronically postive TB test     Obese      Uncomplicated asthma     moderate persistent asthma     Past Surgical History:   Procedure Laterality Date      SECTION      times 2     CHOLECYSTECTOMY       MAMMOPLASTY REDUCTION BILATERAL Bilateral 2018    Procedure: MAMMOPLASTY REDUCTION BILATERAL;  MAMMOPLASTY REDUCTION BILATERAL ;  Surgeon: Maru Nguyen  MD Ragini;  Location: [facility]     UPPER GI ENDOSCOPY       Family Hx:  Family History   Problem Relation Age of Onset     Thyroid Disease Mother      Heart Defect Mother      mitral valve prolapse     Thyroid disease: Yes: mother with hypothyroidism         DM2: No         Autoimmune: DM1, SLE, RA, Vitiligo No    Social Hx:  Social History     Social History     Marital status:      Spouse name: N/A     Number of children: N/A     Years of education: N/A     Occupational History     Not on file.     Social History Main Topics     Smoking status: Never Smoker     Smokeless tobacco: Never Used     Alcohol use Yes      Comment: 1 time evry 3 months, 1 per time     Drug use: No     Sexual activity: Yes     Partners: Male     Birth control/ protection: Inserts/Ring     Other Topics Concern     Not on file     Social History Narrative          MEDICATIONS:  has a current medication list which includes the following prescription(s): albuterol, azelastine, budesonide-formoterol, cetirizine, fluticasone, hydrocodone-acetaminophen, montelukast, phentermine, tetrahydrozoline-zn sulfate, UNABLE TO FIND, UNABLE TO FIND, UNABLE TO FIND, and etonogestrel.    ROS   ROS: 10 point ROS neg other than the symptoms noted above in the HPI.    GENERAL: no weight gain, fevers, chills, malaise, night sweats.   HEENT: no dysphagia, odynophagia, diplopia, neck pain or tenderness  CV: no chest pain, pressure, palpitations, skipped beats, LOC  LUNGS: no SOB, CATSRO, cough, sputum production, wheezing   ABDOMEN: no diarrhea, constipation, abdominal pain  EXTREMITIES: no rashes, ulcers, edema  NEUROLOGY: no changes in vision, tingling or numbness in hands or feet.   MSK: no muscle aches or pains, weakness  SKIN: no rashes or lesions  ENDOCRINE: no heat or cold intolerance      Physical Exam   VS: /68 (BP Location: Left arm, Patient Position: Chair, Cuff Size: Adult Large)  Pulse 73  Temp 98.1  F (36.7  C) (Oral)  Wt 97.7 kg (215 lb 4.8  "oz)  Breastfeeding? No  BMI 38.14 kg/m2  GENERAL: NAD, well dressed, answering questions appropriately, appears stated age.  HEENT: OP clear, no exophthalmos, no proptosis, EOMI, no lig lag, no retraction, no scleral icterus  RESPIRATORY: Clear. Normal respiratory effort.  CARDIOVASCULAR: RRR.  No peripheral edema.  EXTREMITIES: no edema, +pulses, no rashes, no lesions  NEUROLOGY: CN grossly intact, no tremors  MSK: grossly intact, No digital cyanosis. Normal gait and station.  SKIN: no rashes, no lesions, no ulcers  PSYCH: Intact judgment and insight. A&OX3 with a cordial affect.    LABS:   !COMPREHENSIVE Latest Ref Rng & Units 2/6/2018   SODIUM 133 - 144 mmol/L 140   POTASSIUM 3.4 - 5.3 mmol/L 4.5   CHLORIDE 94 - 109 mmol/L 107   BUN 7 - 30 mg/dL 10   Creatinine 0.52 - 1.04 mg/dL 0.90   Glucose 70 - 99 mg/dL 78   ANION GAP 3 - 14 mmol/L 7   CALCIUM 8.5 - 10.1 mg/dL 8.7     Component    Latest Ref Rng & Units 5/15/2017   Cholesterol    <200 mg/dL 195   Triglycerides    <150 mg/dL 239 (H)   HDL Cholesterol    >49 mg/dL 45 (L)   LDL Cholesterol Calculated    <100 mg/dL 102 (H)   Non HDL Cholesterol    <130 mg/dL 150 (H)     !LIPID/HEPATIC Latest Ref Rng & Units 2/6/2018   AST 0 - 45 U/L 18   ALT 0 - 50 U/L 22     !THYROID Latest Ref Rng & Units 2/6/2018   TSH 0.40 - 4.00 mU/L 2.79     Vital Signs 2/6/2018 3/8/2018   Weight (LB) 255 lb 9.6 oz 243 lb   Height 5' 1.75\" 5' 2\"   BMI (Calculated) 47.23 44.54     Vital Signs 8/16/2018   Weight (LB) 215 lb 4.8 oz   Height    BMI (Calculated) 38.14      Waist Circumference:  49\" (2/6/2018). 42.5\" (today)    All pertinent notes, labs, and images personally reviewed by me.     A/P  Ms.Vanessa Gaviria is a 47 year old here for the evaluation of obesity:    1. Morbid Obesity-  BMI 47.23-->44.54-->38.14. Comorbidities - hyperlipidemia.  Obesity is associated with a significant increase in mortality and risk of many disorders, including diabetes mellitus, hypertension, " dyslipidemia, heart disease, stroke, sleep apnea, cancer, and many others. Conversely, weight loss is associated with a reduction in obesity-associated morbidity.    Endocrine evaluation of obesity includes Diabetes/prediabetes, Cushing's and thyroid dysfunction.  The relevant work up for the diagnosis and management of obesity and various treatment options were discussed. Body Mass Index (BMI) has been a standard means for calculating risk for overweight and obesity. The new American Association of Clinical Endocrinology (AACE) algorithm recommends lifestyle modifications as the initial phase of treatment for all patients with the BMI equal or greater than 25 kg/m2. Lifestyle modifications includes use of medical nutrition therapy, exercise, tobacco cessation, adequate quality and quantity of sleep, limited consumption of alcohol and reduced stress through implementation of a structured, multidisciplinary program.    In patients with complications associated with obesity, graded interventions are recommended including pharmacological therapy such as phentermine, orlistat, lorcaserin and phentermine/topiramate ER, contrave ( buproprion/naltreone) and the use of very low calorie meal replacement programs.    If medical intervention is insufficient, surgical therapy may be considered, especially in patients with BMI greater than or equal to 35 kg/m2 with multiple complications. Surgical treatments include lap-band, gastric sleeve or gastric bypass surgery.    I informed the pt that:  1.Weight loss medications can be taken to assist with weight reduction when combined with appropriate healthy lifestyle changes.  2.I discussed possible s/e, risks and benefits of weight loss medications.  3.All medications are FDA approved, however, some may be used ''off label'' for their weight loss benefits and some ''short term'' medications can be used for longer periods to achieve desired clinical outcomes.  4.All patients taking  weight loss medications must be seen in clinic for refill authorization.    Counseling exercise ( V65.41) - start a regular exercise program  Dietary counseling( V65.3) - Recommend 5950-3849 gali/day - eating about 1450 gali/day.  Calculated BMI above the upper parameter and f/u plan was documented in the medical record.  Discussed indications, risks and benefits of all medications prescribed, and answered questions to patient's satisfaction.  Started Phentermine 37.5 mg 2/6/2018.  Has lost 40 lbs in the since starting Phentermine + diet and exercise efforts.255-->215 lbs  Continue Phentermine 37.5 mg qd + diet efforts.  Short term weight loss goal 5% of her current weight in the next 3 months = 12 lbs or approximately 4 lbs per month.    Labs ordered today:   No orders of the defined types were placed in this encounter.    Radiology/Consults ordered today: None    More than 50% of the time spent with Ms. Gaviria on counseling / coordinating her care.  Total face to face time was greater than or equal to 20 minutes.      Follow-up:  follow up in 3 month(s)    Marga Nichole NP  Endocrinology  [facility]  CC: Jackelin Parker   ____________________________________________________________      Again, thank you for allowing me to participate in the care of your patient.        Sincerely,        JOSEFINA Adler CNP

## 2018-10-31 DIAGNOSIS — J31.0 CHRONIC RHINITIS: Primary | ICD-10-CM

## 2018-11-05 RX ORDER — AZELASTINE 1 MG/ML
SPRAY, METERED NASAL
Qty: 30 ML | Refills: 2 | Status: SHIPPED | OUTPATIENT
Start: 2018-11-05 | End: 2019-01-08

## 2018-11-05 NOTE — TELEPHONE ENCOUNTER
Prescription approved per [facility] Refill Protocol.    Koki Ahumada RN

## 2019-01-08 ENCOUNTER — OFFICE VISIT (OUTPATIENT)
Dept: PEDIATRICS | Facility: CLINIC | Age: 49
End: 2019-01-08
Payer: COMMERCIAL

## 2019-01-08 VITALS
HEIGHT: 62 IN | WEIGHT: 215.1 LBS | BODY MASS INDEX: 39.58 KG/M2 | HEART RATE: 88 BPM | OXYGEN SATURATION: 99 % | SYSTOLIC BLOOD PRESSURE: 104 MMHG | TEMPERATURE: 98.1 F | DIASTOLIC BLOOD PRESSURE: 62 MMHG

## 2019-01-08 DIAGNOSIS — J31.0 CHRONIC RHINITIS: ICD-10-CM

## 2019-01-08 DIAGNOSIS — E66.01 MORBID OBESITY (H): ICD-10-CM

## 2019-01-08 DIAGNOSIS — M25.561 RIGHT KNEE PAIN, UNSPECIFIED CHRONICITY: ICD-10-CM

## 2019-01-08 DIAGNOSIS — Z00.00 HEALTHCARE MAINTENANCE: Primary | ICD-10-CM

## 2019-01-08 DIAGNOSIS — N93.8 DUB (DYSFUNCTIONAL UTERINE BLEEDING): ICD-10-CM

## 2019-01-08 PROCEDURE — 99396 PREV VISIT EST AGE 40-64: CPT | Performed by: NURSE PRACTITIONER

## 2019-01-08 PROCEDURE — 99213 OFFICE O/P EST LOW 20 MIN: CPT | Mod: 25 | Performed by: NURSE PRACTITIONER

## 2019-01-08 RX ORDER — AZELASTINE 1 MG/ML
SPRAY, METERED NASAL
Qty: 30 ML | Refills: 2 | Status: SHIPPED | OUTPATIENT
Start: 2019-01-08 | End: 2019-11-05

## 2019-01-08 RX ORDER — PHENTERMINE HYDROCHLORIDE 37.5 MG/1
37.5 TABLET ORAL
Qty: 90 TABLET | Refills: 0 | Status: SHIPPED | OUTPATIENT
Start: 2019-01-08 | End: 2019-02-27

## 2019-01-08 ASSESSMENT — ENCOUNTER SYMPTOMS
ARTHRALGIAS: 1
ABDOMINAL PAIN: 0
PALPITATIONS: 0
NAUSEA: 0
FREQUENCY: 0
SORE THROAT: 0
MYALGIAS: 0
CONSTIPATION: 0
HEARTBURN: 0
CHILLS: 0
SHORTNESS OF BREATH: 0
DYSURIA: 0
EYE PAIN: 0
COUGH: 0
JOINT SWELLING: 0
BREAST MASS: 0
HEMATOCHEZIA: 0
HEADACHES: 0
DIZZINESS: 0
PARESTHESIAS: 0
HEMATURIA: 0
DIARRHEA: 0
WEAKNESS: 0

## 2019-01-08 ASSESSMENT — MIFFLIN-ST. JEOR: SCORE: 1559.97

## 2019-01-08 NOTE — Clinical Note
Hey there!Pam loves you and is losing weight on the phentermine. She had to cancel in November because she was very ill. States you are booking out so far that it was hard to get in and meds weren't renewed.Overall she's wondering if she can see you less. She will see you in February. Not sure what the long term plan is or what you're comfortable with. My guess is she would be ok doing a nurse only blood pressure/weight check every 3 months and an office visit only ever 6, or any other solutions you can recommend. Jackelin Ballard, FNP-DNP.

## 2019-01-08 NOTE — PROGRESS NOTES
"   SUBJECTIVE:   CC: Pam Gaviria is an 48 year old woman who presents for preventive health visit.     Physical   Annual:     Getting at least 3 servings of Calcium per day:  Yes    Bi-annual eye exam:  Yes    Dental care twice a year:  Yes    Sleep apnea or symptoms of sleep apnea:  None    Diet:  Regular (no restrictions)    Frequency of exercise:  4-5 days/week    Duration of exercise:  Greater than 60 minutes    Taking medications regularly:  Yes    Medication side effects:  None    Additional concerns today:  No    PHQ-2 Total Score: 0    Concerns today: discuss phentermine  Asthma follow up  Continuing knee pain  Wants cholesterol test - not fasting today  States she wants \"whole blood panel testing\"    -------------------------------------    Today's PHQ-2 Score:   PHQ-2 ( 1999 [employer]) 1/8/2019   Q1: Little interest or pleasure in doing things 0   Q2: Feeling down, depressed or hopeless 0   PHQ-2 Score 0   Q1: Little interest or pleasure in doing things Not at all   Q2: Feeling down, depressed or hopeless Not at all   PHQ-2 Score 0     Abuse: Current or Past(Physical, Sexual or Emotional)- No  Do you feel safe in your environment? Yes    Social History     Tobacco Use     Smoking status: Never Smoker     Smokeless tobacco: Never Used   Substance Use Topics     Alcohol use: Yes     Comment: 1 time evry 3 months, 1 per time     Alcohol Use 1/8/2019   If you drink alcohol do you typically have greater than 3 drinks per day OR greater than 7 drinks per week? No   No flowsheet data found.    Reviewed orders with patient.  Reviewed health maintenance and updated orders accordingly - Yes  Labs reviewed in EPIC    Mammogram Screening: Patient under age 50, mutual decision reflected in health maintenance.      Pertinent mammograms are reviewed under the imaging tab.  History of abnormal Pap smear: NO - age 30-65 PAP every 5 years with negative HPV co-testing recommended  PAP / HPV Latest Ref Rng & Units 5/12/2017 " "  PAP - OTHER-NIL, See Result   HPV 16 DNA NEG Negative   HPV 18 DNA NEG Negative   OTHER HR HPV NEG Negative     Reviewed and updated as needed this visit by clinical staff  Tobacco  Allergies  Meds  Med Hx  Surg Hx  Fam Hx  Soc Hx        Reviewed and updated as needed this visit by Provider            Review of Systems   Constitutional: Negative for chills.   HENT: Negative for congestion, ear pain, hearing loss and sore throat.    Eyes: Negative for pain and visual disturbance.   Respiratory: Negative for cough and shortness of breath.    Cardiovascular: Negative for chest pain, palpitations and peripheral edema.   Gastrointestinal: Negative for abdominal pain, constipation, diarrhea, heartburn, hematochezia and nausea.   Breasts:  Negative for tenderness, breast mass and discharge.   Genitourinary: Negative for dysuria, frequency, genital sores, hematuria, pelvic pain, urgency, vaginal bleeding and vaginal discharge.   Musculoskeletal: Positive for arthralgias. Negative for joint swelling and myalgias.   Skin: Negative for rash.   Neurological: Negative for dizziness, weakness, headaches and paresthesias.   Psychiatric/Behavioral: Negative for mood changes.     OBJECTIVE:   /62 (BP Location: Right arm)   Pulse 88   Temp 98.1  F (36.7  C) (Tympanic)   Ht 1.568 m (5' 1.75\")   Wt 97.6 kg (215 lb 1.6 oz)   SpO2 99%   BMI 39.66 kg/m    Physical Exam  GENERAL: healthy, alert and no distress  EYES: Eyes grossly normal to inspection, PERRL and conjunctivae and sclerae normal  HENT: ear canals and TM's normal, nose and mouth without ulcers or lesions  NECK: no adenopathy, no asymmetry, masses, or scars and thyroid normal to palpation  RESP: lungs clear to auscultation - no rales, rhonchi or wheezes  BREAST: Well healed scars from breast reduction  CV: regular rate and rhythm, normal S1 S2, no S3 or S4, no murmur, click or rub, no peripheral edema and peripheral pulses strong  ABDOMEN: soft, nontender, " no hepatosplenomegaly, no masses and bowel sounds normal  MS: no gross musculoskeletal defects noted, no edema  SKIN: no suspicious lesions or rashes  NEURO: Normal strength and tone, mentation intact and speech normal  PSYCH: mentation appears normal, affect normal/bright    Diagnostic Test Results:  none     ASSESSMENT/PLAN:   1. Healthcare maintenance  - Comprehensive metabolic panel; Future  - Lipid panel reflex to direct LDL Fasting; Future  - CBC with platelets differential; Future  - **HIV Antigen Antibody Combo FUTURE anytime; Future  - *MA Screening Digital Bilateral; Future    2. Chronic rhinitis  Ongoing, well controlled.   - azelastine (ASTELIN) 0.1 % nasal spray; USE 1 TO 2 SPRAYS IN EACH NOSTRIL TWICE DAILY  Dispense: 30 mL; Refill: 2    3. BMI 45.0-49.9, adult (H)  Sees kamini. Missed her last appt due to being sick and was frustrated that it took so long to reschedule, although she loves Marga. Is hoping she can be seen less often. Tolerating phentermine (has been off for 2 months and has gained 5 lbs) without side effects.   -I told her I would refill once today but needs to discuss plan further with Marga.   - phentermine (ADIPEX-P) 37.5 MG tablet; Take 1 tablet (37.5 mg) by mouth every morning (before breakfast)  Dispense: 90 tablet; Refill: 0    4. Morbid obesity (H)  As above.   - phentermine (ADIPEX-P) 37.5 MG tablet; Take 1 tablet (37.5 mg) by mouth every morning (before breakfast)  Dispense: 90 tablet; Refill: 0    5. Right knee pain, unspecified chronicity  Ongoing, used to see ortho. Not interested in going back at this time  -Continue to lose weight  -Recommended ortho f/u. She will call if interested.     6. DUB (dysfunctional uterine bleeding)  Irregular spotting ever since getting nexplanon. Since this is temporally correlated with nexplanon insertion I'm not concerned about DUB. However, we did discuss rare potential serious etiologies such as endometrial CA.   -She will consider  "switching back to the nuvaring and will call me if interested  -If bleeding persists after switching to the ring or if it worsens int he mean time or is associated with pain or heavy bleeding she is to RTC      COUNSELING:  Reviewed preventive health counseling, as reflected in patient instructions    BP Readings from Last 1 Encounters:   01/08/19 104/62     Estimated body mass index is 39.66 kg/m  as calculated from the following:    Height as of this encounter: 1.568 m (5' 1.75\").    Weight as of this encounter: 97.6 kg (215 lb 1.6 oz).      Weight management plan: Patient referred to endocrine and/or weight management specialty     reports that  has never smoked. she has never used smokeless tobacco.      Counseling Resources:  ATP IV Guidelines  Pooled Cohorts Equation Calculator  Breast Cancer Risk Calculator  FRAX Risk Assessment  ICSI Preventive Guidelines  Dietary Guidelines for Americans, 2010  USDA's MyPlate  ASA Prophylaxis  Lung CA Screening    Jackelin Parker, JOSEFINA [facility]  "

## 2019-01-09 ASSESSMENT — ASTHMA QUESTIONNAIRES: ACT_TOTALSCORE: 23

## 2019-01-25 ENCOUNTER — TELEPHONE (OUTPATIENT)
Dept: ENDOCRINOLOGY | Facility: CLINIC | Age: 49
End: 2019-01-25

## 2019-01-25 RX ORDER — PHENTERMINE HYDROCHLORIDE 37.5 MG/1
37.5 TABLET ORAL
Qty: 35 TABLET | Refills: 0 | Status: SHIPPED | OUTPATIENT
Start: 2019-01-25 | End: 2019-02-27

## 2019-01-25 NOTE — TELEPHONE ENCOUNTER
Faxed phentermine to [facility] in [facility].  Patient informed.  Kamala Monterroso CMA on 1/25/2019 at 3:07 PM

## 2019-02-27 ENCOUNTER — OFFICE VISIT (OUTPATIENT)
Dept: ENDOCRINOLOGY | Facility: CLINIC | Age: 49
End: 2019-02-27
Payer: COMMERCIAL

## 2019-02-27 VITALS
BODY MASS INDEX: 39.5 KG/M2 | OXYGEN SATURATION: 100 % | TEMPERATURE: 98.5 F | WEIGHT: 214.2 LBS | RESPIRATION RATE: 12 BRPM | SYSTOLIC BLOOD PRESSURE: 110 MMHG | DIASTOLIC BLOOD PRESSURE: 78 MMHG | HEART RATE: 77 BPM

## 2019-02-27 DIAGNOSIS — E66.01 MORBID OBESITY (H): ICD-10-CM

## 2019-02-27 PROCEDURE — 99213 OFFICE O/P EST LOW 20 MIN: CPT | Performed by: CLINICAL NURSE SPECIALIST

## 2019-02-27 RX ORDER — PHENTERMINE HYDROCHLORIDE 37.5 MG/1
37.5 TABLET ORAL
Qty: 32 TABLET | Refills: 2 | Status: SHIPPED | OUTPATIENT
Start: 2019-02-27 | End: 2019-06-05

## 2019-02-27 NOTE — PROGRESS NOTES
Name: Pam Gaviria  F/u for obesity (Last seen 2018).  HPI:  Pam Gaviria is a 48 year old female who presents for the managment of obesity.  Tried Nutra system - lost 40 lbs but gained it back.  Worked with a  for 2 years - lost inches but not as much weight.  Tried multiple different diets:  [place] Diet, low carb diets, etc.  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    Started Phentermine 37.5 mg qd 2018.  She is tolerating the Phentermine well, no adverse effects.    Family history of Obesity:Yes: father's side of the family  Diet:   Exercise:Yes: member of the [employer] but hasn't been going - too cold  PMH/PSH:  Past Medical History:   Diagnosis Date     Arthritis      Chronic rhinitis      Heel pain      Latent tuberculosis 2015    chronically postive TB test     Obese      Uncomplicated asthma     moderate persistent asthma     Past Surgical History:   Procedure Laterality Date      SECTION      times 2     CHOLECYSTECTOMY       MAMMOPLASTY REDUCTION BILATERAL Bilateral 2018    Procedure: MAMMOPLASTY REDUCTION BILATERAL;  MAMMOPLASTY REDUCTION BILATERAL ;  Surgeon: Maru Nguyen MD;  Location: [facility]     UPPER GI ENDOSCOPY       Family Hx:  Family History   Problem Relation Age of Onset     Thyroid Disease Mother      Heart Defect Mother         mitral valve prolapse     Thyroid disease: Yes: mother with hypothyroidism         DM2: No         Autoimmune: DM1, SLE, RA, Vitiligo No    Social Hx:  Social History     Socioeconomic History     Marital status:      Spouse name: Not on file     Number of children: Not on file     Years of education: Not on file     Highest education level: Not on file   Occupational History     Not on file   Social Needs     Financial resource strain: Not on file     Food insecurity:     Worry: Not on file      Inability: Not on file     Transportation needs:     Medical: Not on file     Non-medical: Not on file   Tobacco Use     Smoking status: Never Smoker     Smokeless tobacco: Never Used   Substance and Sexual Activity     Alcohol use: Yes     Comment: 1 time evry 3 months, 1 per time     Drug use: No     Sexual activity: Yes     Partners: Male     Birth control/protection: Inserts/Ring   Lifestyle     Physical activity:     Days per week: Not on file     Minutes per session: Not on file     Stress: Not on file   Relationships     Social connections:     Talks on phone: Not on file     Gets together: Not on file     Attends Jew service: Not on file     Active member of club or organization: Not on file     Attends meetings of clubs or organizations: Not on file     Relationship status: Not on file     Intimate partner violence:     Fear of current or ex partner: Not on file     Emotionally abused: Not on file     Physically abused: Not on file     Forced sexual activity: Not on file   Other Topics Concern     Parent/sibling w/ CABG, MI or angioplasty before 65F 55M? Not Asked   Social History Narrative     Not on file          MEDICATIONS:  has a current medication list which includes the following prescription(s): albuterol, azelastine, budesonide-formoterol, cetirizine, fluticasone, montelukast, phentermine, tetrahydrozoline-zn sulfate, UNABLE TO FIND, UNABLE TO FIND, UNABLE TO FIND, and etonogestrel.    ROS   ROS: 10 point ROS neg other than the symptoms noted above in the HPI.    Physical Exam   VS: /78 (BP Location: Left arm, Patient Position: Chair, Cuff Size: Adult Large)   Pulse 77   Temp 98.5  F (36.9  C) (Tympanic)   Resp 12   Wt 97.2 kg (214 lb 3.2 oz)   LMP 02/20/2019 (Exact Date)   SpO2 100%   Breastfeeding? No   BMI 39.50 kg/m    GENERAL: NAD, well dressed, answering questions appropriately, appears stated age.  HEENT: no exophthalmos, no proptosis, no lig lag, no retraction, no  "scleral icterus  RESPIRATORY: Clear. Normal respiratory effort.  CARDIOVASCULAR: RRR.   NEUROLOGY: CN grossly intact, no tremors  MSK: grossly intact. Normal gait and station.  PSYCH: Intact judgment and insight. A&OX3 with a cordial affect.    LABS:   !COMPREHENSIVE Latest Ref Rng & Units 2/6/2018   SODIUM 133 - 144 mmol/L 140   POTASSIUM 3.4 - 5.3 mmol/L 4.5   CHLORIDE 94 - 109 mmol/L 107   BUN 7 - 30 mg/dL 10   Creatinine 0.52 - 1.04 mg/dL 0.90   Glucose 70 - 99 mg/dL 78   ANION GAP 3 - 14 mmol/L 7   CALCIUM 8.5 - 10.1 mg/dL 8.7     Component    Latest Ref Rng & Units 5/15/2017   Cholesterol    <200 mg/dL 195   Triglycerides    <150 mg/dL 239 (H)   HDL Cholesterol    >49 mg/dL 45 (L)   LDL Cholesterol Calculated    <100 mg/dL 102 (H)   Non HDL Cholesterol    <130 mg/dL 150 (H)     !LIPID/HEPATIC Latest Ref Rng & Units 2/6/2018   AST 0 - 45 U/L 18   ALT 0 - 50 U/L 22     !THYROID Latest Ref Rng & Units 2/6/2018   TSH 0.40 - 4.00 mU/L 2.79     Vital Signs 2/6/2018 3/8/2018   Weight (LB) 255 lb 9.6 oz 243 lb   Height 5' 1.75\" 5' 2\"   BMI (Calculated) 47.23 44.54     Vital Signs 8/16/2018 1/8/2019 2/27/2019   Weight (LB) 215 lb 4.8 oz 215 lb 1.6 oz 214 lb 3.2 oz   Height  5' 1.75\"    BMI (Calculated)  39.74 39.5      Waist Circumference:  49\" (2/6/2018). 42.5\" (8/2018). 45.75\" (today)    All pertinent notes, labs, and images personally reviewed by me.     A/P  Ms.Vanessa Gaviria is a 48 year old here for the evaluation of obesity:    1. Morbid Obesity-  BMI 47.23-->44.54-->39.5. Comorbidities - hyperlipidemia.  Obesity is associated with a significant increase in mortality and risk of many disorders, including diabetes mellitus, hypertension, dyslipidemia, heart disease, stroke, sleep apnea, cancer, and many others. Conversely, weight loss is associated with a reduction in obesity-associated morbidity.    Endocrine evaluation of obesity includes Diabetes/prediabetes, Cushing's and thyroid dysfunction.  The relevant " work up for the diagnosis and management of obesity and various treatment options were discussed. Body Mass Index (BMI) has been a standard means for calculating risk for overweight and obesity. The new American Association of Clinical Endocrinology (AACE) algorithm recommends lifestyle modifications as the initial phase of treatment for all patients with the BMI equal or greater than 25 kg/m2. Lifestyle modifications includes use of medical nutrition therapy, exercise, tobacco cessation, adequate quality and quantity of sleep, limited consumption of alcohol and reduced stress through implementation of a structured, multidisciplinary program.    In patients with complications associated with obesity, graded interventions are recommended including pharmacological therapy such as phentermine, orlistat, lorcaserin and phentermine/topiramate ER, contrave ( buproprion/naltreone) and the use of very low calorie meal replacement programs.    If medical intervention is insufficient, surgical therapy may be considered, especially in patients with BMI greater than or equal to 35 kg/m2 with multiple complications. Surgical treatments include lap-band, gastric sleeve or gastric bypass surgery.    I informed the pt that:  1.Weight loss medications can be taken to assist with weight reduction when combined with appropriate healthy lifestyle changes.  2.I discussed possible s/e, risks and benefits of weight loss medications.  3.All medications are FDA approved, however, some may be used ''off label'' for their weight loss benefits and some ''short term'' medications can be used for longer periods to achieve desired clinical outcomes.  4.All patients taking weight loss medications must be seen in clinic for refill authorization.    Counseling exercise ( V65.41) - start a regular exercise program  Dietary counseling( V65.3) - Recommend 2895-5132 gali/day - eating about 1450 gali/day.  Calculated BMI above the upper parameter and f/u  "plan was documented in the medical record.  Discussed indications, risks and benefits of all medications prescribed, and answered questions to patient's satisfaction.  Started Phentermine 37.5 mg 2/6/2018.  Has lost 41 lbs in the since starting Phentermine + diet and exercise efforts.255-->214 lbs  Has lost 4.25\" from her waist  Continue Phentermine 37.5 mg qd + diet efforts.  Noting more hunger than before.  Discussed adding Topamax 25 mg bid to the phentermine.  She would like to try working harder on her diet and exercise first.  If she decides she would like to try adding the Topamax, she'll message me if between visits.      Labs ordered today:   No orders of the defined types were placed in this encounter.    Radiology/Consults ordered today: None    More than 50% of the time spent with Ms. Gaviria on counseling / coordinating her care.  Total face to face time was greater than or equal to 15 minutes.      Follow-up:  follow up in 3 month(s)    Marga Nichole NP  Endocrinology  [facility]  CC: Jackelin Parker   ____________________________________________________________  "

## 2019-02-27 NOTE — PATIENT INSTRUCTIONS
"Waist circ. 45.75\"    You have lost a total of 41 lbs since February 2018!  And you have lost 4.25\" from your waist. (49\" down to 45.75\")    Try taking the phentermine later in the morning - like around 10 am.     Follow up in 3 months.    Marga Nichole NP  Endocrinology    "

## 2019-02-27 NOTE — LETTER
2019         RE: Pam Gaviria  [address]  [place] [address]        Dear Colleague,    Thank you for referring your patient, Pam Gaviria, to the [facility]. Please see a copy of my visit note below.    Name: Pam Gaviria  F/u for obesity (Last seen 2018).  HPI:  Pam Gaviria is a 48 year old female who presents for the managment of obesity.  Tried Nutra system - lost 40 lbs but gained it back.  Worked with a  for 2 years - lost inches but not as much weight.  Tried multiple different diets:  [place] Diet, low carb diets, etc.  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    Started Phentermine 37.5 mg qd 2018.  She is tolerating the Phentermine well, no adverse effects.    Family history of Obesity:Yes: father's side of the family  Diet:   Exercise:Yes: member of the [employer] but hasn't been going - too cold  PMH/PSH:  Past Medical History:   Diagnosis Date     Arthritis      Chronic rhinitis      Heel pain      Latent tuberculosis 2015    chronically postive TB test     Obese      Uncomplicated asthma     moderate persistent asthma     Past Surgical History:   Procedure Laterality Date      SECTION      times 2     CHOLECYSTECTOMY       MAMMOPLASTY REDUCTION BILATERAL Bilateral 2018    Procedure: MAMMOPLASTY REDUCTION BILATERAL;  MAMMOPLASTY REDUCTION BILATERAL ;  Surgeon: Maru Nguyen MD;  Location: [facility]     UPPER GI ENDOSCOPY       Family Hx:  Family History   Problem Relation Age of Onset     Thyroid Disease Mother      Heart Defect Mother         mitral valve prolapse     Thyroid disease: Yes: mother with hypothyroidism         DM2: No         Autoimmune: DM1, SLE, RA, Vitiligo No    Social Hx:  Social History     Socioeconomic History     Marital status:      Spouse name: Not on file      Number of children: Not on file     Years of education: Not on file     Highest education level: Not on file   Occupational History     Not on file   Social Needs     Financial resource strain: Not on file     Food insecurity:     Worry: Not on file     Inability: Not on file     Transportation needs:     Medical: Not on file     Non-medical: Not on file   Tobacco Use     Smoking status: Never Smoker     Smokeless tobacco: Never Used   Substance and Sexual Activity     Alcohol use: Yes     Comment: 1 time evry 3 months, 1 per time     Drug use: No     Sexual activity: Yes     Partners: Male     Birth control/protection: Inserts/Ring   Lifestyle     Physical activity:     Days per week: Not on file     Minutes per session: Not on file     Stress: Not on file   Relationships     Social connections:     Talks on phone: Not on file     Gets together: Not on file     Attends Druze service: Not on file     Active member of club or organization: Not on file     Attends meetings of clubs or organizations: Not on file     Relationship status: Not on file     Intimate partner violence:     Fear of current or ex partner: Not on file     Emotionally abused: Not on file     Physically abused: Not on file     Forced sexual activity: Not on file   Other Topics Concern     Parent/sibling w/ CABG, MI or angioplasty before 65F 55M? Not Asked   Social History Narrative     Not on file          MEDICATIONS:  has a current medication list which includes the following prescription(s): albuterol, azelastine, budesonide-formoterol, cetirizine, fluticasone, montelukast, phentermine, tetrahydrozoline-zn sulfate, UNABLE TO FIND, UNABLE TO FIND, UNABLE TO FIND, and etonogestrel.    ROS   ROS: 10 point ROS neg other than the symptoms noted above in the HPI.    Physical Exam   VS: /78 (BP Location: Left arm, Patient Position: Chair, Cuff Size: Adult Large)   Pulse 77   Temp 98.5  F (36.9  C) (Tympanic)   Resp 12   Wt 97.2 kg (214  "lb 3.2 oz)   LMP 02/20/2019 (Exact Date)   SpO2 100%   Breastfeeding? No   BMI 39.50 kg/m     GENERAL: NAD, well dressed, answering questions appropriately, appears stated age.  HEENT: no exophthalmos, no proptosis, no lig lag, no retraction, no scleral icterus  RESPIRATORY: Clear. Normal respiratory effort.  CARDIOVASCULAR: RRR.   NEUROLOGY: CN grossly intact, no tremors  MSK: grossly intact. Normal gait and station.  PSYCH: Intact judgment and insight. A&OX3 with a cordial affect.    LABS:   !COMPREHENSIVE Latest Ref Rng & Units 2/6/2018   SODIUM 133 - 144 mmol/L 140   POTASSIUM 3.4 - 5.3 mmol/L 4.5   CHLORIDE 94 - 109 mmol/L 107   BUN 7 - 30 mg/dL 10   Creatinine 0.52 - 1.04 mg/dL 0.90   Glucose 70 - 99 mg/dL 78   ANION GAP 3 - 14 mmol/L 7   CALCIUM 8.5 - 10.1 mg/dL 8.7     Component    Latest Ref Rng & Units 5/15/2017   Cholesterol    <200 mg/dL 195   Triglycerides    <150 mg/dL 239 (H)   HDL Cholesterol    >49 mg/dL 45 (L)   LDL Cholesterol Calculated    <100 mg/dL 102 (H)   Non HDL Cholesterol    <130 mg/dL 150 (H)     !LIPID/HEPATIC Latest Ref Rng & Units 2/6/2018   AST 0 - 45 U/L 18   ALT 0 - 50 U/L 22     !THYROID Latest Ref Rng & Units 2/6/2018   TSH 0.40 - 4.00 mU/L 2.79     Vital Signs 2/6/2018 3/8/2018   Weight (LB) 255 lb 9.6 oz 243 lb   Height 5' 1.75\" 5' 2\"   BMI (Calculated) 47.23 44.54     Vital Signs 8/16/2018 1/8/2019 2/27/2019   Weight (LB) 215 lb 4.8 oz 215 lb 1.6 oz 214 lb 3.2 oz   Height  5' 1.75\"    BMI (Calculated)  39.74 39.5      Waist Circumference:  49\" (2/6/2018). 42.5\" (8/2018). 45.75\" (today)    All pertinent notes, labs, and images personally reviewed by me.     A/P  Ms.Vanessa Gaviria is a 48 year old here for the evaluation of obesity:    1. Morbid Obesity-  BMI 47.23-->44.54-->39.5. Comorbidities - hyperlipidemia.  Obesity is associated with a significant increase in mortality and risk of many disorders, including diabetes mellitus, hypertension, dyslipidemia, heart disease, " stroke, sleep apnea, cancer, and many others. Conversely, weight loss is associated with a reduction in obesity-associated morbidity.    Endocrine evaluation of obesity includes Diabetes/prediabetes, Cushing's and thyroid dysfunction.  The relevant work up for the diagnosis and management of obesity and various treatment options were discussed. Body Mass Index (BMI) has been a standard means for calculating risk for overweight and obesity. The new American Association of Clinical Endocrinology (AACE) algorithm recommends lifestyle modifications as the initial phase of treatment for all patients with the BMI equal or greater than 25 kg/m2. Lifestyle modifications includes use of medical nutrition therapy, exercise, tobacco cessation, adequate quality and quantity of sleep, limited consumption of alcohol and reduced stress through implementation of a structured, multidisciplinary program.    In patients with complications associated with obesity, graded interventions are recommended including pharmacological therapy such as phentermine, orlistat, lorcaserin and phentermine/topiramate ER, contrave ( buproprion/naltreone) and the use of very low calorie meal replacement programs.    If medical intervention is insufficient, surgical therapy may be considered, especially in patients with BMI greater than or equal to 35 kg/m2 with multiple complications. Surgical treatments include lap-band, gastric sleeve or gastric bypass surgery.    I informed the pt that:  1.Weight loss medications can be taken to assist with weight reduction when combined with appropriate healthy lifestyle changes.  2.I discussed possible s/e, risks and benefits of weight loss medications.  3.All medications are FDA approved, however, some may be used ''off label'' for their weight loss benefits and some ''short term'' medications can be used for longer periods to achieve desired clinical outcomes.  4.All patients taking weight loss medications must  "be seen in clinic for refill authorization.    Counseling exercise ( V65.41) - start a regular exercise program  Dietary counseling( V65.3) - Recommend 2328-9771 gali/day - eating about 1450 gali/day.  Calculated BMI above the upper parameter and f/u plan was documented in the medical record.  Discussed indications, risks and benefits of all medications prescribed, and answered questions to patient's satisfaction.  Started Phentermine 37.5 mg 2/6/2018.  Has lost 41 lbs in the since starting Phentermine + diet and exercise efforts.255-->214 lbs  Has lost 4.25\" from her waist  Continue Phentermine 37.5 mg qd + diet efforts.  Noting more hunger than before.  Discussed adding Topamax 25 mg bid to the phentermine.  She would like to try working harder on her diet and exercise first.  If she decides she would like to try adding the Topamax, she'll message me if between visits.      Labs ordered today:   No orders of the defined types were placed in this encounter.    Radiology/Consults ordered today: None    More than 50% of the time spent with Ms. Gaviria on counseling / coordinating her care.  Total face to face time was greater than or equal to 15 minutes.      Follow-up:  follow up in 3 month(s)    Marga Nichole NP  Endocrinology  [facility]  CC: Jackelin Parker   ____________________________________________________________      Again, thank you for allowing me to participate in the care of your patient.        Sincerely,        JOSEFINA Adler CNP    "

## 2019-03-01 DIAGNOSIS — Z00.00 HEALTHCARE MAINTENANCE: ICD-10-CM

## 2019-03-01 LAB
ALBUMIN SERPL-MCNC: 3.9 G/DL (ref 3.4–5)
ALP SERPL-CCNC: 55 U/L (ref 40–150)
ALT SERPL W P-5'-P-CCNC: 20 U/L (ref 0–50)
ANION GAP SERPL CALCULATED.3IONS-SCNC: 4 MMOL/L (ref 3–14)
AST SERPL W P-5'-P-CCNC: 15 U/L (ref 0–45)
BASOPHILS # BLD AUTO: 0 10E9/L (ref 0–0.2)
BASOPHILS NFR BLD AUTO: 0.1 %
BILIRUB SERPL-MCNC: 0.7 MG/DL (ref 0.2–1.3)
BUN SERPL-MCNC: 15 MG/DL (ref 7–30)
CALCIUM SERPL-MCNC: 9.1 MG/DL (ref 8.5–10.1)
CHLORIDE SERPL-SCNC: 106 MMOL/L (ref 94–109)
CHOLEST SERPL-MCNC: 226 MG/DL
CO2 SERPL-SCNC: 27 MMOL/L (ref 20–32)
CREAT SERPL-MCNC: 0.94 MG/DL (ref 0.52–1.04)
DIFFERENTIAL METHOD BLD: NORMAL
EOSINOPHIL # BLD AUTO: 0.1 10E9/L (ref 0–0.7)
EOSINOPHIL NFR BLD AUTO: 1.8 %
ERYTHROCYTE [DISTWIDTH] IN BLOOD BY AUTOMATED COUNT: 13.3 % (ref 10–15)
GFR SERPL CREATININE-BSD FRML MDRD: 72 ML/MIN/{1.73_M2}
GLUCOSE SERPL-MCNC: 83 MG/DL (ref 70–99)
HCT VFR BLD AUTO: 41.6 % (ref 35–47)
HDLC SERPL-MCNC: 66 MG/DL
HGB BLD-MCNC: 13.9 G/DL (ref 11.7–15.7)
HIV 1+2 AB+HIV1 P24 AG SERPL QL IA: NONREACTIVE
LDLC SERPL CALC-MCNC: 135 MG/DL
LYMPHOCYTES # BLD AUTO: 2.8 10E9/L (ref 0.8–5.3)
LYMPHOCYTES NFR BLD AUTO: 35.2 %
MCH RBC QN AUTO: 30.7 PG (ref 26.5–33)
MCHC RBC AUTO-ENTMCNC: 33.4 G/DL (ref 31.5–36.5)
MCV RBC AUTO: 92 FL (ref 78–100)
MONOCYTES # BLD AUTO: 0.5 10E9/L (ref 0–1.3)
MONOCYTES NFR BLD AUTO: 6 %
NEUTROPHILS # BLD AUTO: 4.5 10E9/L (ref 1.6–8.3)
NEUTROPHILS NFR BLD AUTO: 56.9 %
NONHDLC SERPL-MCNC: 160 MG/DL
PLATELET # BLD AUTO: 311 10E9/L (ref 150–450)
POTASSIUM SERPL-SCNC: 4.4 MMOL/L (ref 3.4–5.3)
PROT SERPL-MCNC: 7.4 G/DL (ref 6.8–8.8)
RBC # BLD AUTO: 4.53 10E12/L (ref 3.8–5.2)
SODIUM SERPL-SCNC: 137 MMOL/L (ref 133–144)
TRIGL SERPL-MCNC: 125 MG/DL
WBC # BLD AUTO: 7.8 10E9/L (ref 4–11)

## 2019-03-01 PROCEDURE — 80061 LIPID PANEL: CPT | Performed by: NURSE PRACTITIONER

## 2019-03-01 PROCEDURE — 87389 HIV-1 AG W/HIV-1&-2 AB AG IA: CPT | Performed by: NURSE PRACTITIONER

## 2019-03-01 PROCEDURE — 36415 COLL VENOUS BLD VENIPUNCTURE: CPT | Performed by: NURSE PRACTITIONER

## 2019-03-01 PROCEDURE — 85025 COMPLETE CBC W/AUTO DIFF WBC: CPT | Performed by: NURSE PRACTITIONER

## 2019-03-01 PROCEDURE — 80053 COMPREHEN METABOLIC PANEL: CPT | Performed by: NURSE PRACTITIONER

## 2019-03-01 NOTE — LETTER
[facility]  [address]  [place] [address]                  [phone]   March 4, 2019    Pam Gaviria  [address]  [place] [address]      Dear Pam,    Here is a summary of your recent test results:    Liver and kidneys look good.  Blood cells are good.     Cholesterol is worse despite you doing such a good job with weight loss. This can fluctuate day to day. Lets recheck in 6 months.     Your test results are enclosed.      Please contact me if you have any questions.           Thank you very much for choosing [facility]    Best regards,    Jackelin Parker, STEVE        Results for orders placed or performed in visit on 03/01/19   Comprehensive metabolic panel   Result Value Ref Range    Sodium 137 133 - 144 mmol/L    Potassium 4.4 3.4 - 5.3 mmol/L    Chloride 106 94 - 109 mmol/L    Carbon Dioxide 27 20 - 32 mmol/L    Anion Gap 4 3 - 14 mmol/L    Glucose 83 70 - 99 mg/dL    Urea Nitrogen 15 7 - 30 mg/dL    Creatinine 0.94 0.52 - 1.04 mg/dL    GFR Estimate 72 >60 mL/min/[1.73_m2]    GFR Estimate If Black 83 >60 mL/min/[1.73_m2]    Calcium 9.1 8.5 - 10.1 mg/dL    Bilirubin Total 0.7 0.2 - 1.3 mg/dL    Albumin 3.9 3.4 - 5.0 g/dL    Protein Total 7.4 6.8 - 8.8 g/dL    Alkaline Phosphatase 55 40 - 150 U/L    ALT 20 0 - 50 U/L    AST 15 0 - 45 U/L   Lipid panel reflex to direct LDL Fasting   Result Value Ref Range    Cholesterol 226 (H) <200 mg/dL    Triglycerides 125 <150 mg/dL    HDL Cholesterol 66 >49 mg/dL    LDL Cholesterol Calculated 135 (H) <100 mg/dL    Non HDL Cholesterol 160 (H) <130 mg/dL   CBC with platelets differential   Result Value Ref Range    WBC 7.8 4.0 - 11.0 10e9/L    RBC Count 4.53 3.8 - 5.2 10e12/L    Hemoglobin 13.9 11.7 - 15.7 g/dL    Hematocrit 41.6 35.0 - 47.0 %    MCV 92 78 - 100 fl    MCH 30.7 26.5 - 33.0 pg    MCHC 33.4 31.5 - 36.5 g/dL    RDW 13.3 10.0 - 15.0 %    Platelet Count 311 150 - 450 10e9/L    % Neutrophils 56.9  %    % Lymphocytes 35.2 %    % Monocytes 6.0 %    % Eosinophils 1.8 %    % Basophils 0.1 %    Absolute Neutrophil 4.5 1.6 - 8.3 10e9/L    Absolute Lymphocytes 2.8 0.8 - 5.3 10e9/L    Absolute Monocytes 0.5 0.0 - 1.3 10e9/L    Absolute Eosinophils 0.1 0.0 - 0.7 10e9/L    Absolute Basophils 0.0 0.0 - 0.2 10e9/L    Diff Method Automated Method    **HIV Antigen Antibody Combo FUTURE anytime   Result Value Ref Range    HIV Antigen Antibody Combo Nonreactive NR^Nonreactive

## 2019-06-05 ENCOUNTER — OFFICE VISIT (OUTPATIENT)
Dept: ENDOCRINOLOGY | Facility: CLINIC | Age: 49
End: 2019-06-05
Payer: COMMERCIAL

## 2019-06-05 VITALS
RESPIRATION RATE: 12 BRPM | BODY MASS INDEX: 40.27 KG/M2 | HEART RATE: 75 BPM | WEIGHT: 218.4 LBS | SYSTOLIC BLOOD PRESSURE: 97 MMHG | DIASTOLIC BLOOD PRESSURE: 65 MMHG | TEMPERATURE: 98 F

## 2019-06-05 DIAGNOSIS — E66.01 MORBID OBESITY (H): ICD-10-CM

## 2019-06-05 PROCEDURE — 99213 OFFICE O/P EST LOW 20 MIN: CPT | Performed by: CLINICAL NURSE SPECIALIST

## 2019-06-05 RX ORDER — PHENTERMINE HYDROCHLORIDE 37.5 MG/1
37.5 TABLET ORAL
Qty: 90 TABLET | Refills: 0 | Status: SHIPPED | OUTPATIENT
Start: 2019-06-05 | End: 2019-09-05

## 2019-06-05 RX ORDER — TOPIRAMATE 25 MG/1
25 TABLET, FILM COATED ORAL 2 TIMES DAILY
Qty: 180 TABLET | Refills: 1 | Status: SHIPPED | OUTPATIENT
Start: 2019-06-05 | End: 2019-09-05

## 2019-06-05 NOTE — LETTER
2019         RE: Pam Gaviria  [address]  [place] [address]        Dear Colleague,    Thank you for referring your patient, Pam Gaviria, to the [facility]. Please see a copy of my visit note below.    Name: Pam Gaviria  F/u for obesity (Last seen 2019).  HPI:  Pam Gaviria is a 48 year old female who presents for the managment of obesity.  Tried Nutra system - lost 40 lbs but gained it back.  Worked with a  for 2 years - lost inches but not as much weight.  Tried multiple different diets:  [place] Diet, low carb diets, etc.  One of her biggest challenges is meal planning - her boys compete in [employer] and they travel nearly every weekend - eats most meals at fast food restaurants.    Started Phentermine 37.5 mg qd 2018.  She is tolerating the Phentermine well, no adverse effects - not controlling appetite as well.    She was in [place] for 2 months, April and May - gained weight while there.  Back on track.  Will be traveling a lot between now and September - [place] and [facility].  Family history of Obesity:Yes: father's side of the family  Diet:   Exercise:Yes: member of the [employer] but hasn't been going - too cold  PMH/PSH:  Past Medical History:   Diagnosis Date     Arthritis      Chronic rhinitis      Heel pain      Latent tuberculosis 2015    chronically postive TB test     Obese      Uncomplicated asthma     moderate persistent asthma     Past Surgical History:   Procedure Laterality Date      SECTION      times 2     CHOLECYSTECTOMY       MAMMOPLASTY REDUCTION BILATERAL Bilateral 2018    Procedure: MAMMOPLASTY REDUCTION BILATERAL;  MAMMOPLASTY REDUCTION BILATERAL ;  Surgeon: Maru Nguyen MD;  [facility]: [facility]     UPPER GI ENDOSCOPY       Family Hx:  Family History   Problem Relation Age of Onset     Thyroid Disease Mother      Heart Defect Mother         mitral valve prolapse     Thyroid disease: Yes:  mother with hypothyroidism         DM2: No         Autoimmune: DM1, SLE, RA, Vitiligo No    Social Hx:  Social History     Socioeconomic History     Marital status:      Spouse name: Not on file     Number of children: Not on file     Years of education: Not on file     Highest education level: Not on file   Occupational History     Not on file   Social Needs     Financial resource strain: Not on file     Food insecurity:     Worry: Not on file     Inability: Not on file     Transportation needs:     Medical: Not on file     Non-medical: Not on file   Tobacco Use     Smoking status: Never Smoker     Smokeless tobacco: Never Used   Substance and Sexual Activity     Alcohol use: Yes     Comment: 1 time evry 3 months, 1 per time     Drug use: No     Sexual activity: Yes     Partners: Male     Birth control/protection: Inserts/Ring   Lifestyle     Physical activity:     Days per week: Not on file     Minutes per session: Not on file     Stress: Not on file   Relationships     Social connections:     Talks on phone: Not on file     Gets together: Not on file     Attends Quaker service: Not on file     Active member of club or organization: Not on file     Attends meetings of clubs or organizations: Not on file     Relationship status: Not on file     Intimate partner violence:     Fear of current or ex partner: Not on file     Emotionally abused: Not on file     Physically abused: Not on file     Forced sexual activity: Not on file   Other Topics Concern     Parent/sibling w/ CABG, MI or angioplasty before 65F 55M? Not Asked   Social History Narrative     Not on file          MEDICATIONS:  has a current medication list which includes the following prescription(s): albuterol, azelastine, budesonide-formoterol, cetirizine, fluticasone, montelukast, phentermine, tetrahydrozoline-zn sulfate, UNABLE TO FIND, and etonogestrel.    ROS   ROS: 10 point ROS neg other than the symptoms noted above in the HPI.    Physical  "Exam   VS: BP 97/65 (BP Location: Right arm, Patient Position: Chair, Cuff Size: Adult Large)   Pulse 75   Temp 98  F (36.7  C) (Oral)   Resp 12   Wt 99.1 kg (218 lb 6.4 oz)   Breastfeeding? No   BMI 40.27 kg/m     GENERAL: NAD, well dressed, answering questions appropriately, appears stated age.  HEENT: no exophthalmos, no proptosis, no lig lag, no retraction, no scleral icterus  RESPIRATORY: Clear. Normal respiratory effort.  CARDIOVASCULAR: RRR.   NEUROLOGY: CN grossly intact, no tremors  MSK: grossly intact. Normal gait and station.  PSYCH: Intact judgment and insight. A&OX3 with a cordial affect.    LABS:   !COMPREHENSIVE Latest Ref Rng & Units 3/1/2019   SODIUM 133 - 144 mmol/L 137   POTASSIUM 3.4 - 5.3 mmol/L 4.4   CHLORIDE 94 - 109 mmol/L 106   BUN 7 - 30 mg/dL 15   Creatinine 0.52 - 1.04 mg/dL 0.94   Glucose 70 - 99 mg/dL 83   ANION GAP 3 - 14 mmol/L 4   CALCIUM 8.5 - 10.1 mg/dL 9.1   ALBUMIN 3.4 - 5.0 g/dL 3.9     !LIPID/HEPATIC Latest Ref Rng & Units 3/1/2019   CHOLESTEROL <200 mg/dL 226 (H)   TRIGLYCERIDES <150 mg/dL 125   HDL CHOLESTEROL >49 mg/dL 66   LDL CHOLESTEROL, CALCULATED <100 mg/dL 135 (H)   NON HDL CHOLESTEROL <130 mg/dL 160 (H)   AST 0 - 45 U/L 15   ALT 0 - 50 U/L 20     !THYROID Latest Ref Rng & Units 2/6/2018   TSH 0.40 - 4.00 mU/L 2.79     Vital Signs 2/6/2018 3/8/2018   Weight (LB) 255 lb 9.6 oz 243 lb   Height 5' 1.75\" 5' 2\"   BMI (Calculated) 47.23 44.54     Vital Signs 8/16/2018 1/8/2019 2/27/2019 6/5/2019   Weight (LB) 215 lb 4.8 oz 215 lb 1.6 oz 214 lb 3.2 oz 218 lb 6.4 oz   Height  5' 1.75\"     BMI (Calculated)  39.74  40.27    Waist Circumference:  49\" (2/6/2018). 42.5\" (8/2018). 45.75\" (2/2019).  43.25\" (today)    All pertinent notes, labs, and images personally reviewed by me.     A/P  Ms.Vanessa Gaviria is a 48 year old here for the management of obesity:    1. Morbid Obesity-  BMI 47.23-->44.54-->39.5-->40.27. Comorbidities - hyperlipidemia.      Obesity is associated " with a significant increase in mortality and risk of many disorders, including diabetes mellitus, hypertension, dyslipidemia, heart disease, stroke, sleep apnea, cancer, and many others. Conversely, weight loss is associated with a reduction in obesity-associated morbidity.    Endocrine evaluation of obesity includes Diabetes/prediabetes, Cushing's and thyroid dysfunction.  The relevant work up for the diagnosis and management of obesity and various treatment options were discussed. Body Mass Index (BMI) has been a standard means for calculating risk for overweight and obesity. The new American Association of Clinical Endocrinology (AACE) algorithm recommends lifestyle modifications as the initial phase of treatment for all patients with the BMI equal or greater than 25 kg/m2. Lifestyle modifications includes use of medical nutrition therapy, exercise, tobacco cessation, adequate quality and quantity of sleep, limited consumption of alcohol and reduced stress through implementation of a structured, multidisciplinary program.    In patients with complications associated with obesity, graded interventions are recommended including pharmacological therapy such as phentermine, orlistat, lorcaserin and phentermine/topiramate ER, contrave ( buproprion/naltreone) and the use of very low calorie meal replacement programs.    If medical intervention is insufficient, surgical therapy may be considered, especially in patients with BMI greater than or equal to 35 kg/m2 with multiple complications. Surgical treatments include lap-band, gastric sleeve or gastric bypass surgery.    I informed the pt that:  1.Weight loss medications can be taken to assist with weight reduction when combined with appropriate healthy lifestyle changes.  2.I discussed possible s/e, risks and benefits of weight loss medications.  3.All medications are FDA approved, however, some may be used ''off label'' for their weight loss benefits and some ''short  "term'' medications can be used for longer periods to achieve desired clinical outcomes.  4.All patients taking weight loss medications must be seen in clinic for refill authorization.    Counseling exercise ( V65.41) - start a regular exercise program  Dietary counseling( V65.3) - Recommend 2928-6155 gali/day - eating about 1450 gali/day.  Calculated BMI above the upper parameter and f/u plan was documented in the medical record.  Discussed indications, risks and benefits of all medications prescribed, and answered questions to patient's satisfaction.  Started Phentermine 37.5 mg 2/6/2018.  Had lost 41 lbs in the since starting Phentermine + diet and exercise efforts.255-->214 lbs  Has lost 5.75\" from her waist  Gained 4 lbs since last seen but has lost an additional 2.5\" from her waist since last seen  Continue Phentermine 37.5 mg qd + diet efforts.  Noting more hunger than before.    Start Topamax 25 mg bid.   Start Tip, 60 mg tid before meals  Consider IDM Program       Labs ordered today:   No orders of the defined types were placed in this encounter.    Radiology/Consults ordered today: None    More than 50% of the time spent with Ms. Gaviria on counseling / coordinating her care.  Total face to face time was greater than or equal to 15 minutes.      Follow-up:  follow up in 3 month(s)    Marga Nichole NP  Endocrinology  [facility]  CC:  ____________________________________________________________      Again, thank you for allowing me to participate in the care of your patient.        Sincerely,        JOSEFINA Adler CNP    "

## 2019-06-05 NOTE — PATIENT INSTRUCTIONS
"Waist circ. 43.25\"    Start Topamax 25 mg - take one tab with the phentermine in the morning and the second tab before dinner.    Read the book - The Obesity Code.    The website is [employer]    You could also add Tip - up to one capsule before each meal.    Marga Nichole, ABDON  Endocrinology      "

## 2019-06-05 NOTE — PROGRESS NOTES
Name: Pam Gaviria  F/u for obesity (Last seen 2019).  HPI:  Pam Gaviria is a 48 year old female who presents for the managment of obesity.  Tried Nutra system - lost 40 lbs but gained it back.  Worked with a  for 2 years - lost inches but not as much weight.  Tried multiple different diets:  [place] Diet, low carb diets, etc.  One of her biggest challenges is meal planning - her boys compete in [employer] and they travel nearly every weekend - eats most meals at fast food restaurants.    Started Phentermine 37.5 mg qd 2018.  She is tolerating the Phentermine well, no adverse effects - not controlling appetite as well.    She was in [place] for 2 months, April and May - gained weight while there.  Back on track.  Will be traveling a lot between now and September - [place] and [facility].  Family history of Obesity:Yes: father's side of the family  Diet:   Exercise:Yes: member of the [employer] but hasn't been going - too cold  PMH/PSH:  Past Medical History:   Diagnosis Date     Arthritis      Chronic rhinitis      Heel pain      Latent tuberculosis 2015    chronically postive TB test     Obese      Uncomplicated asthma     moderate persistent asthma     Past Surgical History:   Procedure Laterality Date      SECTION      times 2     CHOLECYSTECTOMY       MAMMOPLASTY REDUCTION BILATERAL Bilateral 2018    Procedure: MAMMOPLASTY REDUCTION BILATERAL;  MAMMOPLASTY REDUCTION BILATERAL ;  Surgeon: Maru Nguyen MD;  Location: [facility]     UPPER GI ENDOSCOPY       Family Hx:  Family History   Problem Relation Age of Onset     Thyroid Disease Mother      Heart Defect Mother         mitral valve prolapse     Thyroid disease: Yes: mother with hypothyroidism         DM2: No         Autoimmune: DM1, SLE, RA, Vitiligo No    Social Hx:  Social History     Socioeconomic History     Marital status:      Spouse name: Not on file     Number of children: Not on file     Years  of education: Not on file     Highest education level: Not on file   Occupational History     Not on file   Social Needs     Financial resource strain: Not on file     Food insecurity:     Worry: Not on file     Inability: Not on file     Transportation needs:     Medical: Not on file     Non-medical: Not on file   Tobacco Use     Smoking status: Never Smoker     Smokeless tobacco: Never Used   Substance and Sexual Activity     Alcohol use: Yes     Comment: 1 time evry 3 months, 1 per time     Drug use: No     Sexual activity: Yes     Partners: Male     Birth control/protection: Inserts/Ring   Lifestyle     Physical activity:     Days per week: Not on file     Minutes per session: Not on file     Stress: Not on file   Relationships     Social connections:     Talks on phone: Not on file     Gets together: Not on file     Attends [employer] service: Not on file     Active member of club or organization: Not on file     Attends meetings of clubs or organizations: Not on file     Relationship status: Not on file     Intimate partner violence:     Fear of current or ex partner: Not on file     Emotionally abused: Not on file     Physically abused: Not on file     Forced sexual activity: Not on file   Other Topics Concern     Parent/sibling w/ CABG, MI or angioplasty before 65F 55M? Not Asked   Social History Narrative     Not on file          MEDICATIONS:  has a current medication list which includes the following prescription(s): albuterol, azelastine, budesonide-formoterol, cetirizine, fluticasone, montelukast, phentermine, tetrahydrozoline-zn sulfate, UNABLE TO FIND, and etonogestrel.    ROS   ROS: 10 point ROS neg other than the symptoms noted above in the HPI.    Physical Exam   VS: BP 97/65 (BP Location: Right arm, Patient Position: Chair, Cuff Size: Adult Large)   Pulse 75   Temp 98  F (36.7  C) (Oral)   Resp 12   Wt 99.1 kg (218 lb 6.4 oz)   Breastfeeding? No   BMI 40.27 kg/m    GENERAL: NAD, well dressed,  "answering questions appropriately, appears stated age.  HEENT: no exophthalmos, no proptosis, no lig lag, no retraction, no scleral icterus  RESPIRATORY: Clear. Normal respiratory effort.  CARDIOVASCULAR: RRR.   NEUROLOGY: CN grossly intact, no tremors  MSK: grossly intact. Normal gait and station.  PSYCH: Intact judgment and insight. A&OX3 with a cordial affect.    LABS:   !COMPREHENSIVE Latest Ref Rng & Units 3/1/2019   SODIUM 133 - 144 mmol/L 137   POTASSIUM 3.4 - 5.3 mmol/L 4.4   CHLORIDE 94 - 109 mmol/L 106   BUN 7 - 30 mg/dL 15   Creatinine 0.52 - 1.04 mg/dL 0.94   Glucose 70 - 99 mg/dL 83   ANION GAP 3 - 14 mmol/L 4   CALCIUM 8.5 - 10.1 mg/dL 9.1   ALBUMIN 3.4 - 5.0 g/dL 3.9     !LIPID/HEPATIC Latest Ref Rng & Units 3/1/2019   CHOLESTEROL <200 mg/dL 226 (H)   TRIGLYCERIDES <150 mg/dL 125   HDL CHOLESTEROL >49 mg/dL 66   LDL CHOLESTEROL, CALCULATED <100 mg/dL 135 (H)   NON HDL CHOLESTEROL <130 mg/dL 160 (H)   AST 0 - 45 U/L 15   ALT 0 - 50 U/L 20     !THYROID Latest Ref Rng & Units 2/6/2018   TSH 0.40 - 4.00 mU/L 2.79     Vital Signs 2/6/2018 3/8/2018   Weight (LB) 255 lb 9.6 oz 243 lb   Height 5' 1.75\" 5' 2\"   BMI (Calculated) 47.23 44.54     Vital Signs 8/16/2018 1/8/2019 2/27/2019 6/5/2019   Weight (LB) 215 lb 4.8 oz 215 lb 1.6 oz 214 lb 3.2 oz 218 lb 6.4 oz   Height  5' 1.75\"     BMI (Calculated)  39.74  40.27    Waist Circumference:  49\" (2/6/2018). 42.5\" (8/2018). 45.75\" (2/2019).  43.25\" (today)    All pertinent notes, labs, and images personally reviewed by me.     A/P  Ms.Vanessa Gaviria is a 48 year old here for the management of obesity:    1. Morbid Obesity-  BMI 47.23-->44.54-->39.5-->40.27. Comorbidities - hyperlipidemia.      Obesity is associated with a significant increase in mortality and risk of many disorders, including diabetes mellitus, hypertension, dyslipidemia, heart disease, stroke, sleep apnea, cancer, and many others. Conversely, weight loss is associated with a reduction in " obesity-associated morbidity.    Endocrine evaluation of obesity includes Diabetes/prediabetes, Cushing's and thyroid dysfunction.  The relevant work up for the diagnosis and management of obesity and various treatment options were discussed. Body Mass Index (BMI) has been a standard means for calculating risk for overweight and obesity. The new American Association of Clinical Endocrinology (AACE) algorithm recommends lifestyle modifications as the initial phase of treatment for all patients with the BMI equal or greater than 25 kg/m2. Lifestyle modifications includes use of medical nutrition therapy, exercise, tobacco cessation, adequate quality and quantity of sleep, limited consumption of alcohol and reduced stress through implementation of a structured, multidisciplinary program.    In patients with complications associated with obesity, graded interventions are recommended including pharmacological therapy such as phentermine, orlistat, lorcaserin and phentermine/topiramate ER, contrave ( buproprion/naltreone) and the use of very low calorie meal replacement programs.    If medical intervention is insufficient, surgical therapy may be considered, especially in patients with BMI greater than or equal to 35 kg/m2 with multiple complications. Surgical treatments include lap-band, gastric sleeve or gastric bypass surgery.    I informed the pt that:  1.Weight loss medications can be taken to assist with weight reduction when combined with appropriate healthy lifestyle changes.  2.I discussed possible s/e, risks and benefits of weight loss medications.  3.All medications are FDA approved, however, some may be used ''off label'' for their weight loss benefits and some ''short term'' medications can be used for longer periods to achieve desired clinical outcomes.  4.All patients taking weight loss medications must be seen in clinic for refill authorization.    Counseling exercise ( V65.41) - start a regular exercise  "program  Dietary counseling( V65.3) - Recommend [id number] gail/day - eating about 1450 gali/day.  Calculated BMI above the upper parameter and f/u plan was documented in the medical record.  Discussed indications, risks and benefits of all medications prescribed, and answered questions to patient's satisfaction.  Started Phentermine 37.5 mg 2/6/2018.  Had lost 41 lbs in the since starting Phentermine + diet and exercise efforts.255-->214 lbs  Has lost 5.75\" from her waist  Gained 4 lbs since last seen but has lost an additional 2.5\" from her waist since last seen  Continue Phentermine 37.5 mg qd + diet efforts.  Noting more hunger than before.    Start Topamax 25 mg bid.   Start Tip, 60 mg tid before meals  Consider IDM Program       Labs ordered today:   No orders of the defined types were placed in this encounter.    Radiology/Consults ordered today: None    More than 50% of the time spent with Ms. Gaviria on counseling / coordinating her care.  Total face to face time was greater than or equal to 15 minutes.      Follow-up:  follow up in 3 month(s)    Marga Nichole NP  Endocrinology  [facility]  CC:  ____________________________________________________________  "

## 2019-06-07 DIAGNOSIS — J30.9 CHRONIC ALLERGIC RHINITIS: ICD-10-CM

## 2019-06-07 DIAGNOSIS — J45.40 MODERATE PERSISTENT ASTHMA WITHOUT COMPLICATION: Primary | ICD-10-CM

## 2019-06-07 NOTE — TELEPHONE ENCOUNTER
"Requested Prescriptions   Pending Prescriptions Disp Refills     SYMBICORT 160-4.5 MCG/ACT Inhaler [Pharmacy Med Name: SYMBICORT 160/4.5MCG (120 ORAL INH)] 10.2 g 0     Sig: INHALE 2 PUFFS INTO THE LUNGS TWICE DAILY       Inhaled Steroids Protocol Passed - 6/7/2019 11:54 AM        Passed - Patient is age 12 or older        Passed - Asthma control assessment score within normal limits in last 6 months     Please review ACT score.           Passed - Medication is active on med list        Passed - Recent (6 mo) or future (30 days) visit within the authorizing provider's specialty     Patient had office visit in the last 6 months or has a visit in the next 30 days with authorizing provider or within the authorizing provider's specialty.  See \"Patient Info\" tab in inbasket, or \"Choose Columns\" in Meds & Orders section of the refill encounter.            montelukast (SINGULAIR) 10 MG tablet [Pharmacy Med Name: MONTELUKAST 10MG TABLETS] 90 tablet 0     Sig: TAKE 1 TABLET(10 MG) BY MOUTH AT BEDTIME       Leukotriene Inhibitors Protocol Passed - 6/7/2019 11:54 AM        Passed - Patient is age 12 or older     If patient is under 16, ok to refill using age based dosing.           Passed - Asthma control assessment score within normal limits in last 6 months     Please review ACT score.   ACT Total Scores 6/13/2017 5/7/2018 1/8/2019   ACT TOTAL SCORE - - -   ASTHMA ER VISITS - - -   ASTHMA HOSPITALIZATIONS - - -   ACT TOTAL SCORE (Goal Greater than or Equal to 20) 24 20 23   In the past 12 months, how many times did you visit the emergency room for your asthma without being admitted to the hospital? 0 0 0   In the past 12 months, how many times were you hospitalized overnight because of your asthma? 0 0 0             Passed - Medication is active on med list        Passed - Recent (6 mo) or future (30 days) visit within the authorizing provider's specialty     Patient had office visit in the last 6 months or has a visit in " "the next 30 days with authorizing provider or within the authorizing provider's specialty.  See \"Patient Info\" tab in inbasket, or \"Choose Columns\" in Meds & Orders section of the refill encounter.            budesonide-formoterol (SYMBICORT) 160-4.5 MCG/ACT Inhaler  Last Written Prescription Date:  5/7/18  Last Fill Quantity: 1,  # refills: 11   Last office visit: 1/8/2019 with prescribing provider:  NIKOLE Parker   Future Office Visit:   Next 5 appointments (look out 90 days)    Jun 18, 2019  9:15 AM CDT  Screening Mammogram [address] ([facility]) [address] ,[address]  [place] [address] [phone]   Sep 05, 2019 11:00 AM CDT  Return Visit with JOSEFINA Adler [facility] ([facility]) [address]  [place] [address]  [phone]           montelukast (SINGULAIR) 10 MG tablet  Last Written Prescription Date:  5/7/18  Last Fill Quantity: 90,  # refills: 3   Last office visit: 1/8/2019 with prescribing provider:  NIKOLE Parker   Future Office Visit:   Next 5 appointments (look out 90 days)    Jun 18, 2019  9:15 AM CDT  Screening Mammogram with [address] ([facility]) [address] ,[address]  [facility] [address]  [phone]   Sep 05, 2019 11:00 AM CDT  Return Visit with JOSEFINA Adler [facility] ([facility]) [address]  [place] [address]  [phone]           "

## 2019-06-10 RX ORDER — MONTELUKAST SODIUM 10 MG/1
TABLET ORAL
Qty: 90 TABLET | Refills: 0 | Status: SHIPPED | OUTPATIENT
Start: 2019-06-10 | End: 2019-11-05

## 2019-06-10 RX ORDER — BUDESONIDE AND FORMOTEROL FUMARATE DIHYDRATE 160; 4.5 UG/1; UG/1
AEROSOL RESPIRATORY (INHALATION)
Qty: 10.2 G | Refills: 0 | Status: SHIPPED | OUTPATIENT
Start: 2019-06-10 | End: 2019-09-05

## 2019-06-18 DIAGNOSIS — Z00.00 HEALTHCARE MAINTENANCE: ICD-10-CM

## 2019-06-18 PROCEDURE — 77067 SCR MAMMO BI INCL CAD: CPT | Mod: TC

## 2019-06-18 PROCEDURE — 77063 BREAST TOMOSYNTHESIS BI: CPT | Mod: TC

## 2019-06-21 ENCOUNTER — HOSPITAL ENCOUNTER (OUTPATIENT)
Dept: ULTRASOUND IMAGING | Facility: CLINIC | Age: 49
Discharge: HOME | End: 2019-06-21
Attending: NURSE PRACTITIONER | Admitting: NURSE PRACTITIONER
Payer: COMMERCIAL

## 2019-06-21 DIAGNOSIS — R92.8 ABNORMAL MAMMOGRAM: ICD-10-CM

## 2019-06-21 PROCEDURE — 76642 ULTRASOUND BREAST LIMITED: CPT | Mod: RT

## 2019-09-05 ENCOUNTER — OFFICE VISIT (OUTPATIENT)
Dept: ENDOCRINOLOGY | Facility: CLINIC | Age: 49
End: 2019-09-05
Payer: COMMERCIAL

## 2019-09-05 VITALS
SYSTOLIC BLOOD PRESSURE: 99 MMHG | RESPIRATION RATE: 12 BRPM | HEART RATE: 85 BPM | BODY MASS INDEX: 39.4 KG/M2 | WEIGHT: 213.7 LBS | DIASTOLIC BLOOD PRESSURE: 67 MMHG | TEMPERATURE: 98 F

## 2019-09-05 DIAGNOSIS — J30.9 CHRONIC ALLERGIC RHINITIS: ICD-10-CM

## 2019-09-05 DIAGNOSIS — E66.01 MORBID OBESITY (H): ICD-10-CM

## 2019-09-05 DIAGNOSIS — J45.40 MODERATE PERSISTENT ASTHMA WITHOUT COMPLICATION: ICD-10-CM

## 2019-09-05 PROCEDURE — 99213 OFFICE O/P EST LOW 20 MIN: CPT | Performed by: CLINICAL NURSE SPECIALIST

## 2019-09-05 RX ORDER — TOPIRAMATE 25 MG/1
25 TABLET, FILM COATED ORAL 2 TIMES DAILY
Qty: 180 TABLET | Refills: 1 | Status: SHIPPED | OUTPATIENT
Start: 2019-09-05 | End: 2020-09-21

## 2019-09-05 RX ORDER — PHENTERMINE HYDROCHLORIDE 37.5 MG/1
37.5 TABLET ORAL
Qty: 90 TABLET | Refills: 0 | Status: SHIPPED | OUTPATIENT
Start: 2019-09-05 | End: 2020-06-23

## 2019-09-05 NOTE — LETTER
2019         RE: Pam Gaviria  [address]  [place] [address]        Dear Colleague,    Thank you for referring your patient, Pam Gaviria, to the [facility]. Please see a copy of my visit note below.    Name: Pam Gaviria  F/u for obesity (Last seen 2019).  HPI:  Pam Gaviria is a 49 year old female who presents for the managment of obesity.  Tried Nutra system - lost 40 lbs but gained it back.  Worked with a  for 2 years - lost inches but not as much weight.  Tried multiple different diets:  [place] Diet, low carb diets, etc.  One of her biggest challenges is meal planning - her boys compete in [employer] and they travel nearly every weekend - eats most meals at fast food restaurants.    Started Phentermine 37.5 mg qd 2018.  She is tolerating the Phentermine well, no adverse effects - not controlling appetite as well.  Added Topamax 25 mg bid 2019.    She was in [place] for 2 months, April and May - gained weight while there.  Back on track.  Traveling a lot over the summer - [place] and [facility].  Family history of Obesity:Yes: father's side of the family  Diet:   Exercise:Yes: member of the [employer] but hasn't been going - too cold  PMH/PSH:  Past Medical History:   Diagnosis Date     Arthritis      Chronic rhinitis      Heel pain      Latent tuberculosis 2015    chronically postive TB test     Obese      Uncomplicated asthma     moderate persistent asthma     Past Surgical History:   Procedure Laterality Date      SECTION      times 2     CHOLECYSTECTOMY       MAMMOPLASTY REDUCTION BILATERAL Bilateral 2018    Procedure: MAMMOPLASTY REDUCTION BILATERAL;  MAMMOPLASTY REDUCTION BILATERAL ;  Surgeon: Maru Nguyen MD;  Location: [facility]     UPPER GI ENDOSCOPY       Family Hx:  Family History   Problem Relation Age of Onset     Thyroid Disease Mother      Heart Defect Mother         mitral valve prolapse     Thyroid  disease: Yes: mother with hypothyroidism         DM2: No         Autoimmune: DM1, SLE, RA, Vitiligo No    Social Hx:  Social History     Socioeconomic History     Marital status:      Spouse name: Not on file     Number of children: Not on file     Years of education: Not on file     Highest education level: Not on file   Occupational History     Not on file   Social Needs     Financial resource strain: Not on file     Food insecurity:     Worry: Not on file     Inability: Not on file     Transportation needs:     Medical: Not on file     Non-medical: Not on file   Tobacco Use     Smoking status: Never Smoker     Smokeless tobacco: Never Used   Substance and Sexual Activity     Alcohol use: Yes     Comment: 1 time evry 3 months, 1 per time     Drug use: No     Sexual activity: Yes     Partners: Male     Birth control/protection: Inserts/Ring   Lifestyle     Physical activity:     Days per week: Not on file     Minutes per session: Not on file     Stress: Not on file   Relationships     Social connections:     Talks on phone: Not on file     Gets together: Not on file     Attends [employer] service: Not on file     Active member of club or organization: Not on file     Attends meetings of clubs or organizations: Not on file     Relationship status: Not on file     Intimate partner violence:     Fear of current or ex partner: Not on file     Emotionally abused: Not on file     Physically abused: Not on file     Forced sexual activity: Not on file   Other Topics Concern     Parent/sibling w/ CABG, MI or angioplasty before 65F 55M? Not Asked   Social History Narrative     Not on file          MEDICATIONS:  has a current medication list which includes the following prescription(s): albuterol, azelastine, cetirizine, etonogestrel, fluticasone, montelukast, phentermine, symbicort, tetrahydrozoline-zn sulfate, topiramate, and UNABLE TO FIND.    ROS   ROS: 10 point ROS neg other than the symptoms noted above in the  "HPI.    Physical Exam   VS: BP 99/67 (BP Location: Right arm, Patient Position: Chair, Cuff Size: Adult Large)   Pulse 85   Temp 98  F (36.7  C) (Oral)   Resp 12   Wt 96.9 kg (213 lb 11.2 oz)   Breastfeeding? No   BMI 39.40 kg/m     GENERAL: NAD, well dressed, answering questions appropriately, appears stated age.  HEENT: no exophthalmos, no proptosis, no lig lag, no retraction, no scleral icterus  RESPIRATORY: Clear. Normal respiratory effort.  CARDIOVASCULAR: RRR.   NEUROLOGY: CN grossly intact, no tremors  MSK: grossly intact. Normal gait and station.  PSYCH: Intact judgment and insight. A&OX3 with a cordial affect.    LABS:   !COMPREHENSIVE Latest Ref Rng & Units 3/1/2019   SODIUM 133 - 144 mmol/L 137   POTASSIUM 3.4 - 5.3 mmol/L 4.4   CHLORIDE 94 - 109 mmol/L 106   BUN 7 - 30 mg/dL 15   Creatinine 0.52 - 1.04 mg/dL 0.94   Glucose 70 - 99 mg/dL 83   ANION GAP 3 - 14 mmol/L 4   CALCIUM 8.5 - 10.1 mg/dL 9.1   ALBUMIN 3.4 - 5.0 g/dL 3.9     !LIPID/HEPATIC Latest Ref Rng & Units 3/1/2019   CHOLESTEROL <200 mg/dL 226 (H)   TRIGLYCERIDES <150 mg/dL 125   HDL CHOLESTEROL >49 mg/dL 66   LDL CHOLESTEROL, CALCULATED <100 mg/dL 135 (H)   NON HDL CHOLESTEROL <130 mg/dL 160 (H)   AST 0 - 45 U/L 15   ALT 0 - 50 U/L 20     !THYROID Latest Ref Rng & Units 2/6/2018   TSH 0.40 - 4.00 mU/L 2.79     Vital Signs 2/6/2018 3/8/2018   Weight (LB) 255 lb 9.6 oz 243 lb   Height 5' 1.75\" 5' 2\"   BMI (Calculated) 47.23 44.54     Vital Signs 6/5/2019 9/5/2019   Weight (LB) 218 lb 6.4 oz 213 lb 11.2 oz   Height     BMI (Calculated)  39.4      Waist Circumference:  49\" (2/6/2018). 42.5\" (8/2018). 45.75\" (2/2019).  43.25\" (6/2019).  41.5\" (today).    All pertinent notes, labs, and images personally reviewed by me.     A/P  Ms.Vanessa Gaviria is a 48 year old here for the management of obesity:    1. Morbid Obesity-  BMI 47.23--> 39.4. Comorbidities - hyperlipidemia.      Obesity is associated with a significant increase in mortality and " "risk of many disorders, including diabetes mellitus, hypertension, dyslipidemia, heart disease, stroke, sleep apnea, cancer, and many others. Conversely, weight loss is associated with a reduction in obesity-associated morbidity.      I informed the pt that:  1.Weight loss medications can be taken to assist with weight reduction when combined with appropriate healthy lifestyle changes.  2.I discussed possible s/e, risks and benefits of weight loss medications.  3.All medications are FDA approved, however, some may be used ''off label'' for their weight loss benefits and some ''short term'' medications can be used for longer periods to achieve desired clinical outcomes.  4.All patients taking weight loss medications must be seen in clinic for refill authorization.    Counseling exercise ( V65.41) - start a regular exercise program  Dietary counseling( V65.3) - Recommend 6112-5456 gali/day - eating about 1450 gali/day.  Calculated BMI above the upper parameter and f/u plan was documented in the medical record.  Discussed indications, risks and benefits of all medications prescribed, and answered questions to patient's satisfaction.  Started Phentermine 37.5 mg 2/6/2018.  Has lost 42 lbs in the since starting Phentermine + diet and exercise efforts.255-->213 lbs  Has lost 7.5\" from her waist  Continue Phentermine 37.5 mg qd + diet efforts.  Continue topamax - only remembers to take am dose       Labs ordered today:   No orders of the defined types were placed in this encounter.    Radiology/Consults ordered today: None    More than 50% of the time spent with Ms. Gaviria on counseling / coordinating her care.  Total face to face time was greater than or equal to 15 minutes.      Follow-up:  follow up in 3 month(s)    Marga Nichole NP  Endocrinology  [facility]  CC:  ____________________________________________________________Name: Pam Vaughanpson  F/u for obesity (Last seen 6/5/2019).  HPI:  Pam Vaughanpson is a " 49 year old female who presents for the managment of obesity.  Tried Nutra system - lost 40 lbs but gained it back.  Worked with a  for 2 years - lost inches but not as much weight.  Tried multiple different diets:  [place] Diet, low carb diets, etc.  One of her biggest challenges is meal planning - her boys compete in [employer] and they travel nearly every weekend - eats most meals at fast food restaurants.    Started Phentermine 37.5 mg qd 2018.  She is tolerating the Phentermine well, no adverse effects.  Topamax 25 mg bid added 2019.    She was in [place] for 2 months, April and May - gained weight while there.  Back on track.  Will be traveling a lot between now and September - [place] and [facility].  Family history of Obesity:Yes: father's side of the family  Diet:   Exercise:Yes: member of the [employer] but hasn't been going - too cold  PMH/PSH:  Past Medical History:   Diagnosis Date     Arthritis      Chronic rhinitis      Heel pain      Latent tuberculosis 2015    chronically postive TB test     Obese      Uncomplicated asthma     moderate persistent asthma     Past Surgical History:   Procedure Laterality Date      SECTION      times 2     CHOLECYSTECTOMY       MAMMOPLASTY REDUCTION BILATERAL Bilateral 2018    Procedure: MAMMOPLASTY REDUCTION BILATERAL;  MAMMOPLASTY REDUCTION BILATERAL ;  Surgeon: Maru Nguyen MD;  Location: [facility]     UPPER GI ENDOSCOPY       Family Hx:  Family History   Problem Relation Age of Onset     Thyroid Disease Mother      Heart Defect Mother         mitral valve prolapse     Thyroid disease: Yes: mother with hypothyroidism         DM2: No         Autoimmune: DM1, SLE, RA, Vitiligo No    Social Hx:  Social History     Socioeconomic History     Marital status:      Spouse name: Not on file     Number of children: Not on file     Years of education: Not on file     Highest education level: Not on file   Occupational History      Not on file   Social Needs     Financial resource strain: Not on file     Food insecurity:     Worry: Not on file     Inability: Not on file     Transportation needs:     Medical: Not on file     Non-medical: Not on file   Tobacco Use     Smoking status: Never Smoker     Smokeless tobacco: Never Used   Substance and Sexual Activity     Alcohol use: Yes     Comment: 1 time evry 3 months, 1 per time     Drug use: No     Sexual activity: Yes     Partners: Male     Birth control/protection: Inserts/Ring   Lifestyle     Physical activity:     Days per week: Not on file     Minutes per session: Not on file     Stress: Not on file   Relationships     Social connections:     Talks on phone: Not on file     Gets together: Not on file     Attends [employer] service: Not on file     Active member of club or organization: Not on file     Attends meetings of clubs or organizations: Not on file     Relationship status: Not on file     Intimate partner violence:     Fear of current or ex partner: Not on file     Emotionally abused: Not on file     Physically abused: Not on file     Forced sexual activity: Not on file   Other Topics Concern     Parent/sibling w/ CABG, MI or angioplasty before 65F 55M? Not Asked   Social History Narrative     Not on file          MEDICATIONS:  has a current medication list which includes the following prescription(s): albuterol, azelastine, cetirizine, etonogestrel, fluticasone, montelukast, phentermine, symbicort, tetrahydrozoline-zn sulfate, topiramate, and UNABLE TO FIND.    ROS   ROS: 10 point ROS neg other than the symptoms noted above in the HPI.    Physical Exam   VS: There were no vitals taken for this visit.  GENERAL: NAD, well dressed, answering questions appropriately, appears stated age.  HEENT: no exophthalmos, no proptosis, no lig lag, no retraction, no scleral icterus  RESPIRATORY: Clear. Normal respiratory effort.  CARDIOVASCULAR: RRR.   NEUROLOGY: CN grossly intact, no  "tremors  MSK: grossly intact. Normal gait and station.  PSYCH: Intact judgment and insight. A&OX3 with a cordial affect.    LABS:   !COMPREHENSIVE Latest Ref Rng & Units 3/1/2019   SODIUM 133 - 144 mmol/L 137   POTASSIUM 3.4 - 5.3 mmol/L 4.4   CHLORIDE 94 - 109 mmol/L 106   BUN 7 - 30 mg/dL 15   Creatinine 0.52 - 1.04 mg/dL 0.94   Glucose 70 - 99 mg/dL 83   ANION GAP 3 - 14 mmol/L 4   CALCIUM 8.5 - 10.1 mg/dL 9.1   ALBUMIN 3.4 - 5.0 g/dL 3.9     !LIPID/HEPATIC Latest Ref Rng & Units 3/1/2019   CHOLESTEROL <200 mg/dL 226 (H)   TRIGLYCERIDES <150 mg/dL 125   HDL CHOLESTEROL >49 mg/dL 66   LDL CHOLESTEROL, CALCULATED <100 mg/dL 135 (H)   NON HDL CHOLESTEROL <130 mg/dL 160 (H)   AST 0 - 45 U/L 15   ALT 0 - 50 U/L 20     !THYROID Latest Ref Rng & Units 2/6/2018   TSH 0.40 - 4.00 mU/L 2.79     Vital Signs 2/6/2018 3/8/2018   Weight (LB) 255 lb 9.6 oz 243 lb   Height 5' 1.75\" 5' 2\"   BMI (Calculated) 47.23 44.54     Vital Signs 8/16/2018 1/8/2019 2/27/2019 6/5/2019   Weight (LB) 215 lb 4.8 oz 215 lb 1.6 oz 214 lb 3.2 oz 218 lb 6.4 oz   Height  5' 1.75\"     BMI (Calculated)  39.74  40.27    Waist Circumference:  49\" (2/6/2018). 42.5\" (8/2018). 45.75\" (2/2019).  43.25\" (today)    All pertinent notes, labs, and images personally reviewed by me.     A/P  Ms.Vanessa Gaviria is a 49 year old here for the management of obesity:    1. Morbid Obesity-  BMI 47.23-->44.54-->39.5-->40.27. Comorbidities - hyperlipidemia.      Obesity is associated with a significant increase in mortality and risk of many disorders, including diabetes mellitus, hypertension, dyslipidemia, heart disease, stroke, sleep apnea, cancer, and many others. Conversely, weight loss is associated with a reduction in obesity-associated morbidity.    Endocrine evaluation of obesity includes Diabetes/prediabetes, Cushing's and thyroid dysfunction.  The relevant work up for the diagnosis and management of obesity and various treatment options were discussed. Body Mass " Index (BMI) has been a standard means for calculating risk for overweight and obesity. The new American Association of Clinical Endocrinology (AACE) algorithm recommends lifestyle modifications as the initial phase of treatment for all patients with the BMI equal or greater than 25 kg/m2. Lifestyle modifications includes use of medical nutrition therapy, exercise, tobacco cessation, adequate quality and quantity of sleep, limited consumption of alcohol and reduced stress through implementation of a structured, multidisciplinary program.    In patients with complications associated with obesity, graded interventions are recommended including pharmacological therapy such as phentermine, orlistat, lorcaserin and phentermine/topiramate ER, contrave ( buproprion/naltreone) and the use of very low calorie meal replacement programs.    If medical intervention is insufficient, surgical therapy may be considered, especially in patients with BMI greater than or equal to 35 kg/m2 with multiple complications. Surgical treatments include lap-band, gastric sleeve or gastric bypass surgery.    I informed the pt that:  1.Weight loss medications can be taken to assist with weight reduction when combined with appropriate healthy lifestyle changes.  2.I discussed possible s/e, risks and benefits of weight loss medications.  3.All medications are FDA approved, however, some may be used ''off label'' for their weight loss benefits and some ''short term'' medications can be used for longer periods to achieve desired clinical outcomes.  4.All patients taking weight loss medications must be seen in clinic for refill authorization.    Counseling exercise ( V65.41) - start a regular exercise program  Dietary counseling( V65.3) - Recommend 5342-8501 gali/day - eating about 1450 gali/day.  Calculated BMI above the upper parameter and f/u plan was documented in the medical record.  Discussed indications, risks and benefits of all medications  "prescribed, and answered questions to patient's satisfaction.  Started Phentermine 37.5 mg 2/6/2018.  Had lost 41 lbs since starting Phentermine + diet and exercise efforts.255-->214 lbs  Has lost 5.75\" from her waist    Continue Phentermine 37.5 mg qd + diet efforts.  Noting more hunger than before.    Start Topamax 25 mg bid.   Start Tip, 60 mg tid before meals  Consider IDM Program       Labs ordered today:   No orders of the defined types were placed in this encounter.    Radiology/Consults ordered today: None    More than 50% of the time spent with Ms. Gaviria on counseling / coordinating her care.  Total face to face time was greater than or equal to 15 minutes.      Follow-up:  follow up in 3 month(s)    Marga Nichole NP  Endocrinology  [facility]  CC:  ____________________________________________________________      Again, thank you for allowing me to participate in the care of your patient.        Sincerely,        JOSEFINA Adler CNP    "

## 2019-09-05 NOTE — TELEPHONE ENCOUNTER
"Requested Prescriptions   Pending Prescriptions Disp Refills     SYMBICORT 160-4.5 MCG/ACT Inhaler [Pharmacy Med Name: SYMBICORT 160/4.5MCG (120 ORAL INH)] 10.2 g 0     Sig: INHALE 2 PUFFS INTO THE LUNGS TWICE DAILY       Inhaled Steroids Protocol Failed - 9/5/2019 11:51 AM        Failed - Asthma control assessment score within normal limits in last 6 months     Please review ACT score.   ACT Total Scores 6/13/2017 5/7/2018 1/8/2019   ACT TOTAL SCORE - - -   ASTHMA ER VISITS - - -   ASTHMA HOSPITALIZATIONS - - -   ACT TOTAL SCORE (Goal Greater than or Equal to 20) 24 20 23   In the past 12 months, how many times did you visit the emergency room for your asthma without being admitted to the hospital? 0 0 0   In the past 12 months, how many times were you hospitalized overnight because of your asthma? 0 0 0             Failed - Recent (6 mo) or future (30 days) visit within the authorizing provider's specialty     Patient had office visit in the last 6 months or has a visit in the next 30 days with authorizing provider or within the authorizing provider's specialty.  See \"Patient Info\" tab in inbasket, or \"Choose Columns\" in Meds & Orders section of the refill encounter.            Passed - Patient is age 12 or older        Passed - Medication is active on med list          SYMBICORT 160-4.5 MCG/ACT Inhaler  Last Written Prescription Date:  6-10-19  Last Fill Quantity: 10.2g,  # refills: 0   Last office visit: 1/8/2019 with prescribing provider:  NIKOLE Parker   Future Office Visit:      "

## 2019-09-05 NOTE — PROGRESS NOTES
Name: Pam Gaviria  F/u for obesity (Last seen 2019).  HPI:  Pam Gaviria is a 49 year old female who presents for the managment of obesity.  Tried Nutra system - lost 40 lbs but gained it back.  Worked with a  for 2 years - lost inches but not as much weight.  Tried multiple different diets:  [place] Diet, low carb diets, etc.  One of her biggest challenges is meal planning - her boys compete in [employer] and they travel nearly every weekend - eats most meals at fast food restaurants.    Started Phentermine 37.5 mg qd 2018.  She is tolerating the Phentermine well, no adverse effects - not controlling appetite as well.  Added Topamax 25 mg bid 2019.    She was in [place] for 2 months, April and May - gained weight while there.  Back on track.  Traveling a lot over the summer - [place] and [facility].  Family history of Obesity:Yes: father's side of the family  Diet:   Exercise:Yes: member of the [employer] but hasn't been going - too cold  PMH/PSH:  Past Medical History:   Diagnosis Date     Arthritis      Chronic rhinitis      Heel pain      Latent tuberculosis 2015    chronically postive TB test     Obese      Uncomplicated asthma     moderate persistent asthma     Past Surgical History:   Procedure Laterality Date      SECTION      times 2     CHOLECYSTECTOMY       MAMMOPLASTY REDUCTION BILATERAL Bilateral 2018    Procedure: MAMMOPLASTY REDUCTION BILATERAL;  MAMMOPLASTY REDUCTION BILATERAL ;  Surgeon: Maru Nguyen MD;  Location: [facility]     UPPER GI ENDOSCOPY       Family Hx:  Family History   Problem Relation Age of Onset     Thyroid Disease Mother      Heart Defect Mother         mitral valve prolapse     Thyroid disease: Yes: mother with hypothyroidism         DM2: No         Autoimmune: DM1, SLE, RA, Vitiligo No    Social Hx:  Social History     Socioeconomic History     Marital status:      Spouse name: Not on file     Number of children: Not on  file     Years of education: Not on file     Highest education level: Not on file   Occupational History     Not on file   Social Needs     Financial resource strain: Not on file     Food insecurity:     Worry: Not on file     Inability: Not on file     Transportation needs:     Medical: Not on file     Non-medical: Not on file   Tobacco Use     Smoking status: Never Smoker     Smokeless tobacco: Never Used   Substance and Sexual Activity     Alcohol use: Yes     Comment: 1 time evry 3 months, 1 per time     Drug use: No     Sexual activity: Yes     Partners: Male     Birth control/protection: Inserts/Ring   Lifestyle     Physical activity:     Days per week: Not on file     Minutes per session: Not on file     Stress: Not on file   Relationships     Social connections:     Talks on phone: Not on file     Gets together: Not on file     Attends [employer] service: Not on file     Active member of club or organization: Not on file     Attends meetings of clubs or organizations: Not on file     Relationship status: Not on file     Intimate partner violence:     Fear of current or ex partner: Not on file     Emotionally abused: Not on file     Physically abused: Not on file     Forced sexual activity: Not on file   Other Topics Concern     Parent/sibling w/ CABG, MI or angioplasty before 65F 55M? Not Asked   Social History Narrative     Not on file          MEDICATIONS:  has a current medication list which includes the following prescription(s): albuterol, azelastine, cetirizine, etonogestrel, fluticasone, montelukast, phentermine, symbicort, tetrahydrozoline-zn sulfate, topiramate, and UNABLE TO FIND.    ROS   ROS: 10 point ROS neg other than the symptoms noted above in the HPI.    Physical Exam   VS: BP 99/67 (BP Location: Right arm, Patient Position: Chair, Cuff Size: Adult Large)   Pulse 85   Temp 98  F (36.7  C) (Oral)   Resp 12   Wt 96.9 kg (213 lb 11.2 oz)   Breastfeeding? No   BMI 39.40 kg/m    GENERAL:  "NAD, well dressed, answering questions appropriately, appears stated age.  HEENT: no exophthalmos, no proptosis, no lig lag, no retraction, no scleral icterus  RESPIRATORY: Clear. Normal respiratory effort.  CARDIOVASCULAR: RRR.   NEUROLOGY: CN grossly intact, no tremors  MSK: grossly intact. Normal gait and station.  PSYCH: Intact judgment and insight. A&OX3 with a cordial affect.    LABS:   !COMPREHENSIVE Latest Ref Rng & Units 3/1/2019   SODIUM 133 - 144 mmol/L 137   POTASSIUM 3.4 - 5.3 mmol/L 4.4   CHLORIDE 94 - 109 mmol/L 106   BUN 7 - 30 mg/dL 15   Creatinine 0.52 - 1.04 mg/dL 0.94   Glucose 70 - 99 mg/dL 83   ANION GAP 3 - 14 mmol/L 4   CALCIUM 8.5 - 10.1 mg/dL 9.1   ALBUMIN 3.4 - 5.0 g/dL 3.9     !LIPID/HEPATIC Latest Ref Rng & Units 3/1/2019   CHOLESTEROL <200 mg/dL 226 (H)   TRIGLYCERIDES <150 mg/dL 125   HDL CHOLESTEROL >49 mg/dL 66   LDL CHOLESTEROL, CALCULATED <100 mg/dL 135 (H)   NON HDL CHOLESTEROL <130 mg/dL 160 (H)   AST 0 - 45 U/L 15   ALT 0 - 50 U/L 20     !THYROID Latest Ref Rng & Units 2/6/2018   TSH 0.40 - 4.00 mU/L 2.79     Vital Signs 2/6/2018 3/8/2018   Weight (LB) 255 lb 9.6 oz 243 lb   Height 5' 1.75\" 5' 2\"   BMI (Calculated) 47.23 44.54     Vital Signs 6/5/2019 9/5/2019   Weight (LB) 218 lb 6.4 oz 213 lb 11.2 oz   Height     BMI (Calculated)  39.4      Waist Circumference:  49\" (2/6/2018). 42.5\" (8/2018). 45.75\" (2/2019).  43.25\" (6/2019).  41.5\" (today).    All pertinent notes, labs, and images personally reviewed by me.     A/P  Ms.Vanessa Gaviria is a 48 year old here for the management of obesity:    1. Morbid Obesity-  BMI 47.23--> 39.4. Comorbidities - hyperlipidemia.      Obesity is associated with a significant increase in mortality and risk of many disorders, including diabetes mellitus, hypertension, dyslipidemia, heart disease, stroke, sleep apnea, cancer, and many others. Conversely, weight loss is associated with a reduction in obesity-associated morbidity.      I informed " "the pt that:  1.Weight loss medications can be taken to assist with weight reduction when combined with appropriate healthy lifestyle changes.  2.I discussed possible s/e, risks and benefits of weight loss medications.  3.All medications are FDA approved, however, some may be used ''off label'' for their weight loss benefits and some ''short term'' medications can be used for longer periods to achieve desired clinical outcomes.  4.All patients taking weight loss medications must be seen in clinic for refill authorization.    Counseling exercise ( V65.41) - start a regular exercise program  Dietary counseling( V65.3) - Recommend 2693-6690 gali/day - eating about 1450 gali/day.  Calculated BMI above the upper parameter and f/u plan was documented in the medical record.  Discussed indications, risks and benefits of all medications prescribed, and answered questions to patient's satisfaction.  Started Phentermine 37.5 mg 2/6/2018.  Has lost 42 lbs in the since starting Phentermine + diet and exercise efforts.255-->213 lbs  Has lost 7.5\" from her waist  Continue Phentermine 37.5 mg qd + diet efforts.  Continue topamax - only remembers to take am dose       Labs ordered today:   No orders of the defined types were placed in this encounter.    Radiology/Consults ordered today: None    More than 50% of the time spent with Ms. Gaviria on counseling / coordinating her care.  Total face to face time was greater than or equal to 15 minutes.      Follow-up:  follow up in 3 month(s)    Marga Nichole NP  Endocrinology  [facility]  CC:  ____________________________________________________________Name: Pam Gaviria  F/u for obesity (Last seen 6/5/2019).  HPI:  Pam Gaviria is a 49 year old female who presents for the managment of obesity.  Tried Nutra system - lost 40 lbs but gained it back.  Worked with a  for 2 years - lost inches but not as much weight.  Tried multiple different diets:  [place] " Diet, low carb diets, etc.  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    Started Phentermine 37.5 mg qd 2018.  She is tolerating the Phentermine well, no adverse effects.  Topamax 25 mg bid added 2019.    She was in [place] for 2 months, April and May - gained weight while there.  Back on track.  Will be traveling a lot between now and September - [place] and [facility].  Family history of Obesity:Yes: father's side of the family  Diet:   Exercise:Yes: member of the [employer] but hasn't been going - too cold  PMH/PSH:  Past Medical History:   Diagnosis Date     Arthritis      Chronic rhinitis      Heel pain      Latent tuberculosis 2015    chronically postive TB test     Obese      Uncomplicated asthma     moderate persistent asthma     Past Surgical History:   Procedure Laterality Date      SECTION      times 2     CHOLECYSTECTOMY       MAMMOPLASTY REDUCTION BILATERAL Bilateral 2018    Procedure: MAMMOPLASTY REDUCTION BILATERAL;  MAMMOPLASTY REDUCTION BILATERAL ;  Surgeon: Maru Nguyen MD;  [facility]     UPPER GI ENDOSCOPY       Family Hx:  Family History   Problem Relation Age of Onset     Thyroid Disease Mother      Heart Defect Mother         mitral valve prolapse     Thyroid disease: Yes: mother with hypothyroidism         DM2: No         Autoimmune: DM1, SLE, RA, Vitiligo No    Social Hx:  Social History     Socioeconomic History     Marital status:      Spouse name: Not on file     Number of children: Not on file     Years of education: Not on file     Highest education level: Not on file   Occupational History     Not on file   Social Needs     Financial resource strain: Not on file     Food insecurity:     Worry: Not on file     Inability: Not on file     Transportation needs:     Medical: Not on file     Non-medical: Not on file   Tobacco Use      Smoking status: Never Smoker     Smokeless tobacco: Never Used   Substance and Sexual Activity     Alcohol use: Yes     Comment: 1 time evry 3 months, 1 per time     Drug use: No     Sexual activity: Yes     Partners: Male     Birth control/protection: Inserts/Ring   Lifestyle     Physical activity:     Days per week: Not on file     Minutes per session: Not on file     Stress: Not on file   Relationships     Social connections:     Talks on phone: Not on file     Gets together: Not on file     Attends [employer] service: Not on file     Active member of club or organization: Not on file     Attends meetings of clubs or organizations: Not on file     Relationship status: Not on file     Intimate partner violence:     Fear of current or ex partner: Not on file     Emotionally abused: Not on file     Physically abused: Not on file     Forced sexual activity: Not on file   Other Topics Concern     Parent/sibling w/ CABG, MI or angioplasty before 65F 55M? Not Asked   Social History Narrative     Not on file          MEDICATIONS:  has a current medication list which includes the following prescription(s): albuterol, azelastine, cetirizine, etonogestrel, fluticasone, montelukast, phentermine, symbicort, tetrahydrozoline-zn sulfate, topiramate, and UNABLE TO FIND.    ROS   ROS: 10 point ROS neg other than the symptoms noted above in the HPI.    Physical Exam   VS: There were no vitals taken for this visit.  GENERAL: NAD, well dressed, answering questions appropriately, appears stated age.  HEENT: no exophthalmos, no proptosis, no lig lag, no retraction, no scleral icterus  RESPIRATORY: Clear. Normal respiratory effort.  CARDIOVASCULAR: RRR.   NEUROLOGY: CN grossly intact, no tremors  MSK: grossly intact. Normal gait and station.  PSYCH: Intact judgment and insight. A&OX3 with a cordial affect.    LABS:   !COMPREHENSIVE Latest Ref Rng & Units 3/1/2019   SODIUM 133 - 144 mmol/L 137   POTASSIUM 3.4 - 5.3 mmol/L 4.4   CHLORIDE  "94 - 109 mmol/L 106   BUN 7 - 30 mg/dL 15   Creatinine 0.52 - 1.04 mg/dL 0.94   Glucose 70 - 99 mg/dL 83   ANION GAP 3 - 14 mmol/L 4   CALCIUM 8.5 - 10.1 mg/dL 9.1   ALBUMIN 3.4 - 5.0 g/dL 3.9     !LIPID/HEPATIC Latest Ref Rng & Units 3/1/2019   CHOLESTEROL <200 mg/dL 226 (H)   TRIGLYCERIDES <150 mg/dL 125   HDL CHOLESTEROL >49 mg/dL 66   LDL CHOLESTEROL, CALCULATED <100 mg/dL 135 (H)   NON HDL CHOLESTEROL <130 mg/dL 160 (H)   AST 0 - 45 U/L 15   ALT 0 - 50 U/L 20     !THYROID Latest Ref Rng & Units 2/6/2018   TSH 0.40 - 4.00 mU/L 2.79     Vital Signs 2/6/2018 3/8/2018   Weight (LB) 255 lb 9.6 oz 243 lb   Height 5' 1.75\" 5' 2\"   BMI (Calculated) 47.23 44.54     Vital Signs 8/16/2018 1/8/2019 2/27/2019 6/5/2019   Weight (LB) 215 lb 4.8 oz 215 lb 1.6 oz 214 lb 3.2 oz 218 lb 6.4 oz   Height  5' 1.75\"     BMI (Calculated)  39.74  40.27    Waist Circumference:  49\" (2/6/2018). 42.5\" (8/2018). 45.75\" (2/2019).  43.25\" (today)    All pertinent notes, labs, and images personally reviewed by me.     A/P  Ms.Vanessa Gaviria is a 49 year old here for the management of obesity:    1. Morbid Obesity-  BMI 47.23-->44.54-->39.5-->40.27. Comorbidities - hyperlipidemia.      Obesity is associated with a significant increase in mortality and risk of many disorders, including diabetes mellitus, hypertension, dyslipidemia, heart disease, stroke, sleep apnea, cancer, and many others. Conversely, weight loss is associated with a reduction in obesity-associated morbidity.    Endocrine evaluation of obesity includes Diabetes/prediabetes, Cushing's and thyroid dysfunction.  The relevant work up for the diagnosis and management of obesity and various treatment options were discussed. Body Mass Index (BMI) has been a standard means for calculating risk for overweight and obesity. The new American Association of Clinical Endocrinology (AACE) algorithm recommends lifestyle modifications as the initial phase of treatment for all patients with " "the BMI equal or greater than 25 kg/m2. Lifestyle modifications includes use of medical nutrition therapy, exercise, tobacco cessation, adequate quality and quantity of sleep, limited consumption of alcohol and reduced stress through implementation of a structured, multidisciplinary program.    In patients with complications associated with obesity, graded interventions are recommended including pharmacological therapy such as phentermine, orlistat, lorcaserin and phentermine/topiramate ER, contrave ( buproprion/naltreone) and the use of very low calorie meal replacement programs.    If medical intervention is insufficient, surgical therapy may be considered, especially in patients with BMI greater than or equal to 35 kg/m2 with multiple complications. Surgical treatments include lap-band, gastric sleeve or gastric bypass surgery.    I informed the pt that:  1.Weight loss medications can be taken to assist with weight reduction when combined with appropriate healthy lifestyle changes.  2.I discussed possible s/e, risks and benefits of weight loss medications.  3.All medications are FDA approved, however, some may be used ''off label'' for their weight loss benefits and some ''short term'' medications can be used for longer periods to achieve desired clinical outcomes.  4.All patients taking weight loss medications must be seen in clinic for refill authorization.    Counseling exercise ( V65.41) - start a regular exercise program  Dietary counseling( V65.3) - Recommend 3309-5861 gali/day - eating about 1450 gali/day.  Calculated BMI above the upper parameter and f/u plan was documented in the medical record.  Discussed indications, risks and benefits of all medications prescribed, and answered questions to patient's satisfaction.  Started Phentermine 37.5 mg 2/6/2018.  Had lost 41 lbs since starting Phentermine + diet and exercise efforts.255-->214 lbs  Has lost 5.75\" from her waist    Continue Phentermine 37.5 mg qd + " diet efforts.  Noting more hunger than before.    Start Topamax 25 mg bid.   Start Tip, 60 mg tid before meals  Consider IDM Program       Labs ordered today:   No orders of the defined types were placed in this encounter.    Radiology/Consults ordered today: None    More than 50% of the time spent with Ms. Gaviria on counseling / coordinating her care.  Total face to face time was greater than or equal to 15 minutes.      Follow-up:  follow up in 3 month(s)    Marga Nichole NP  Endocrinology  [facility]  CC:  ____________________________________________________________

## 2019-09-06 RX ORDER — BUDESONIDE AND FORMOTEROL FUMARATE DIHYDRATE 160; 4.5 UG/1; UG/1
AEROSOL RESPIRATORY (INHALATION)
Qty: 10.2 G | Refills: 0 | Status: SHIPPED | OUTPATIENT
Start: 2019-09-06 | End: 2019-11-05

## 2019-09-06 NOTE — TELEPHONE ENCOUNTER
Prescription approved per [facility] Refill Protocol.  Sabine Haro RN

## 2019-11-05 ENCOUNTER — OFFICE VISIT (OUTPATIENT)
Dept: PEDIATRICS | Facility: CLINIC | Age: 49
End: 2019-11-05
Payer: COMMERCIAL

## 2019-11-05 ENCOUNTER — TELEPHONE (OUTPATIENT)
Dept: PHARMACY | Facility: CLINIC | Age: 49
End: 2019-11-05

## 2019-11-05 VITALS
HEART RATE: 92 BPM | WEIGHT: 215.7 LBS | DIASTOLIC BLOOD PRESSURE: 62 MMHG | HEIGHT: 62 IN | OXYGEN SATURATION: 99 % | SYSTOLIC BLOOD PRESSURE: 106 MMHG | TEMPERATURE: 98.1 F | BODY MASS INDEX: 39.69 KG/M2 | RESPIRATION RATE: 16 BRPM

## 2019-11-05 DIAGNOSIS — F43.9 STRESS: ICD-10-CM

## 2019-11-05 DIAGNOSIS — J31.0 CHRONIC RHINITIS: ICD-10-CM

## 2019-11-05 DIAGNOSIS — J45.40 MODERATE PERSISTENT ASTHMA WITHOUT COMPLICATION: Primary | ICD-10-CM

## 2019-11-05 DIAGNOSIS — J30.9 CHRONIC ALLERGIC RHINITIS: ICD-10-CM

## 2019-11-05 DIAGNOSIS — Z23 NEED FOR IMMUNIZATION AGAINST INFLUENZA: ICD-10-CM

## 2019-11-05 PROCEDURE — 90686 IIV4 VACC NO PRSV 0.5 ML IM: CPT | Performed by: NURSE PRACTITIONER

## 2019-11-05 PROCEDURE — 90471 IMMUNIZATION ADMIN: CPT | Performed by: NURSE PRACTITIONER

## 2019-11-05 RX ORDER — MONTELUKAST SODIUM 10 MG/1
TABLET ORAL
Qty: 90 TABLET | Refills: 3 | Status: SHIPPED | OUTPATIENT
Start: 2019-11-05 | End: 2020-02-18

## 2019-11-05 RX ORDER — AZELASTINE 1 MG/ML
SPRAY, METERED NASAL
Qty: 30 ML | Refills: 11 | Status: SHIPPED | OUTPATIENT
Start: 2019-11-05 | End: 2020-02-18

## 2019-11-05 RX ORDER — BUDESONIDE AND FORMOTEROL FUMARATE DIHYDRATE 160; 4.5 UG/1; UG/1
AEROSOL RESPIRATORY (INHALATION)
Qty: 10.2 G | Refills: 11 | Status: SHIPPED | OUTPATIENT
Start: 2019-11-05 | End: 2020-02-18

## 2019-11-05 ASSESSMENT — MIFFLIN-ST. JEOR: SCORE: 1557.69

## 2019-11-05 NOTE — Clinical Note
Hey there! Would you be willing to do a phone visit with this patient for asthma med cost issues? She is willing to come in if need be.

## 2019-11-05 NOTE — TELEPHONE ENCOUNTER
Jackelin Parker asked that this patient be seen in December to review asthma and inhaler cost.  Can you please set up an appointment to do this.  We can meet sooner if she has cost issues now.    Thank you!

## 2019-11-05 NOTE — LETTER
My Asthma Action Plan    Name: Pam Gaviria   YOB: 1970  Date: 11/5/2019   My doctor: JOSEFINA Horne CNP   My clinic: [facility] [place]        My Control Medicine: Budesonide + formoterol (Symbicort HFA) -  160/4.5 mcg .  Montelukast (Singulair) -  10 mg .  My Rescue Medicine: Albuterol (Proair/Ventolin/Proventil HFA) 2-4 puffs EVERY 4 HOURS as needed. Use a spacer if recommended by your provider.   My Asthma Severity:   Moderate Persistent  Know your asthma triggers: upper respiratory infections  pollens            GREEN ZONE   Good Control    I feel good    No cough or wheeze    Can work, sleep and play without asthma symptoms       Take your asthma control medicine every day.     1. If exercise triggers your asthma, take your rescue medication    15 minutes before exercise or sports, and    During exercise if you have asthma symptoms  2. Spacer to use with inhaler: If you have a spacer, make sure to use it with your inhaler             YELLOW ZONE Getting Worse  I have ANY of these:    I do not feel good    Cough or wheeze    Chest feels tight    Wake up at night   1. Keep taking your Green Zone medications  2. Start taking your rescue medicine:    every 20 minutes for up to 1 hour. Then every 4 hours for 24-48 hours.  3. If you stay in the Yellow Zone for more than 12-24 hours, contact your doctor.  4. If you do not return to the Green Zone in 12-24 hours or you get worse, start taking your oral steroid medicine if prescribed by your provider.           RED ZONE Medical Alert - Get Help  I have ANY of these:    I feel awful    Medicine is not helping    Breathing getting harder    Trouble walking or talking    Nose opens wide to breathe       1. Take your rescue medicine NOW  2. If your provider has prescribed an oral steroid medicine, start taking it NOW  3. Call your doctor NOW  4. If you are still in the Red Zone after 20 minutes and you have not reached your  doctor:    Take your rescue medicine again and    Call 911 or go to the emergency room right away    See your regular doctor within 2 weeks of an Emergency Room or Urgent Care visit for follow-up treatment.          Annual Reminders:  Meet with Asthma Educator,  Flu Shot in the Fall, consider Pneumonia Vaccination for patients with asthma (aged 19 and older).    Pharmacy: [id number] - [place], [place] - [address]  AT [address]                          Asthma Triggers  How To Control Things That Make Your Asthma Worse    Triggers are things that make your asthma worse.  Look at the list below to help you find your triggers and what you can do about them.  You can help prevent asthma flare-ups by staying away from your triggers.      Trigger                                                          What you can do   Cigarette Smoke  Tobacco smoke can make asthma worse. Do not allow smoking in your home, car or around you.  Be sure no one smokes at a child s day care or school.  If you smoke, ask your health care provider for ways to help you quit.  Ask family members to quit too.  Ask your health care provider for a referral to Quit Plan to help you quit smoking, or call 5-138-570-PLAN.     Colds, Flu, Bronchitis  These are common triggers of asthma. Wash your hands often.  Don t touch your eyes, nose or mouth.  Get a flu shot every year.     Dust Mites  These are tiny bugs that live in cloth or carpet. They are too small to see. Wash sheets and blankets in hot water every week.   Encase pillows and mattress in dust mite proof covers.  Avoid having carpet if you can. If you have carpet, vacuum weekly.   Use a dust mask and HEPA vacuum.   Pollen and Outdoor Mold  Some people are allergic to trees, grass, or weed pollen, or molds. Try to keep your windows closed.  Limit time out doors when pollen count is high.   Ask you health care provider about taking medicine during allergy  season.     Animal Dander  Some people are allergic to skin flakes, urine or saliva from pets with fur or feathers. Keep pets with fur or feathers out of your home.    If you can t keep the pet outdoors, then keep the pet out of your bedroom.  Keep the bedroom door closed.  Keep pets off cloth furniture and away from stuffed toys.     Mice, Rats, and Cockroaches   Some people are allergic to the waste from these pests.   Cover food and garbage.  Clean up spills and food crumbs.  Store grease in the refrigerator.   Keep food out of the bedroom.   Indoor Mold  This can be a trigger if your home has high moisture. Fix leaking faucets, pipes, or other sources of water.   Clean moldy surfaces.  Dehumidify basement if it is damp and smelly.   Smoke, Strong Odors, and Sprays  These can reduce air quality. Stay away from strong odors and sprays, such as perfume, powder, hair spray, paints, smoke incense, paint, cleaning products, candles and new carpet.   Exercise or Sports  Some people with asthma have this trigger. Be active!  Ask your doctor about taking medicine before sports or exercise to prevent symptoms.    Warm up for 5-10 minutes before and after sports or exercise.     Other Triggers of Asthma  Cold air:  Cover your nose and mouth with a scarf.  Sometimes laughing or crying can be a trigger.  Some medicines and food can trigger asthma.

## 2019-11-05 NOTE — PROGRESS NOTES
"Subjective     Pam Gaviria is a 49 year old female who presents to clinic today for the following health issues:    HPI   Asthma Follow-Up    Was ACT completed today?    Yes    ACT Total Scores 11/5/2019   ACT TOTAL SCORE -   ASTHMA ER VISITS -   ASTHMA HOSPITALIZATIONS -   ACT TOTAL SCORE (Goal Greater than or Equal to 20) 25   In the past 12 months, how many times did you visit the emergency room for your asthma without being admitted to the hospital? 0   In the past 12 months, how many times were you hospitalized overnight because of your asthma? 0       How many days per week do you miss taking your asthma controller medication?  0    Please describe any recent triggers for your asthma: upper respiratory infections    Have you had any Emergency Room Visits, Urgent Care Visits, or Hospital Admissions since your last office visit?  No      ROS: const/resp otherwise negative     OBJECTIVE:  /62 (BP Location: Right arm, Cuff Size: Adult Large)   Pulse 92   Temp 98.1  F (36.7  C) (Tympanic)   Resp 16   Ht 1.568 m (5' 1.75\")   Wt 97.8 kg (215 lb 11.2 oz)   SpO2 99%   BMI 39.77 kg/m    CONSTITUTIONAL: Alert, well-nourished, well-groomed, NAD  RESP: Lungs CTA. No wheeze, rhonchi, rales.  CV: HRRR S1 S2 No MRG. No peripheral edema      ASSESSMENT/PLAN:  (J45.40) Moderate persistent asthma without complication  (primary encounter diagnosis)  Comment: Well controlled. Inhaler is now expensive.   Plan: budesonide-formoterol (SYMBICORT) 160-4.5         MCG/ACT Inhaler        I asked her to see MTM in December to discuss inhaler cost.     NO CHARGED VISIT BECAUSE SHE DID NOT NEED TO COME IN FOR REFILLS      (Z23) Need for immunization against influenza  Plan: INFLUENZA VACCINE IM > 6 MONTHS VALENT IIV4         [25117],      ADMIN VACCINE, FIRST [74576]            (F43.9) Stress  Plan: MENTAL HEALTH REFERRAL  - Adult; Outpatient         Treatment; Individual/Couples/Family/Group         Therapy/Health " Psychology; [facility]: [facility] [phone]; We will         contact you to schedule the appointment or         please call with any questions            (J30.9) Chronic allergic rhinitis  Plan: budesonide-formoterol (SYMBICORT) 160-4.5         MCG/ACT Inhaler, montelukast (SINGULAIR) 10 MG         tablet            (J31.0) Chronic rhinitis  Plan: azelastine (ASTELIN) 0.1 % nasal spray              DEMOND Mace.

## 2019-11-05 NOTE — LETTER
November 25, 2019      Pam Gaviria  [address] [address]        Dear Pam,       We care about your health and have reviewed your health plan including your medical conditions, medications, and lab results.  Based on this review, it is recommended that you follow up regarding the following health topic(s):  -Medication review with a Medication Therapy Manager    Please call us at the [facility] - [phone] (or use Tap[employer]) to address the above recommendations.     Thank you for trusting [facility] and we appreciate the opportunity to serve you.  We look forward to supporting your healthcare needs in the future.    Healthy Regards,    Your Health Care Team  [facility]

## 2019-11-06 ASSESSMENT — ASTHMA QUESTIONNAIRES: ACT_TOTALSCORE: 25

## 2020-02-17 ASSESSMENT — ENCOUNTER SYMPTOMS
MYALGIAS: 1
CHILLS: 0
DYSURIA: 0
HEMATURIA: 0
JOINT SWELLING: 0
FEVER: 0
PARESTHESIAS: 0
DIZZINESS: 0
SHORTNESS OF BREATH: 0
HEMATOCHEZIA: 0
SORE THROAT: 1
NERVOUS/ANXIOUS: 0
COUGH: 0
BREAST MASS: 0
WEAKNESS: 0
FREQUENCY: 0
EYE PAIN: 0
HEARTBURN: 0
ABDOMINAL PAIN: 0
HEADACHES: 0
PALPITATIONS: 0
CONSTIPATION: 0
DIARRHEA: 0
NAUSEA: 0
ARTHRALGIAS: 1

## 2020-02-18 ENCOUNTER — OFFICE VISIT (OUTPATIENT)
Dept: PEDIATRICS | Facility: CLINIC | Age: 50
End: 2020-02-18
Payer: COMMERCIAL

## 2020-02-18 VITALS
OXYGEN SATURATION: 99 % | WEIGHT: 222.9 LBS | RESPIRATION RATE: 16 BRPM | DIASTOLIC BLOOD PRESSURE: 64 MMHG | HEIGHT: 62 IN | TEMPERATURE: 97.7 F | BODY MASS INDEX: 41.02 KG/M2 | HEART RATE: 86 BPM | SYSTOLIC BLOOD PRESSURE: 118 MMHG

## 2020-02-18 DIAGNOSIS — M25.562 PAIN IN BOTH KNEES, UNSPECIFIED CHRONICITY: ICD-10-CM

## 2020-02-18 DIAGNOSIS — F43.9 STRESS: ICD-10-CM

## 2020-02-18 DIAGNOSIS — J31.0 CHRONIC RHINITIS: ICD-10-CM

## 2020-02-18 DIAGNOSIS — M25.561 PAIN IN BOTH KNEES, UNSPECIFIED CHRONICITY: ICD-10-CM

## 2020-02-18 DIAGNOSIS — J45.40 MODERATE PERSISTENT ASTHMA WITHOUT COMPLICATION: ICD-10-CM

## 2020-02-18 DIAGNOSIS — Z00.00 ROUTINE GENERAL MEDICAL EXAMINATION AT A HEALTH CARE FACILITY: Primary | ICD-10-CM

## 2020-02-18 PROBLEM — E66.01 MORBID OBESITY (H): Status: RESOLVED | Noted: 2017-05-12 | Resolved: 2020-02-18

## 2020-02-18 PROBLEM — N93.8 DUB (DYSFUNCTIONAL UTERINE BLEEDING): Status: ACTIVE | Noted: 2020-02-18

## 2020-02-18 LAB
CHOLEST SERPL-MCNC: 257 MG/DL
HBA1C MFR BLD: 5.1 % (ref 0–5.6)
HDLC SERPL-MCNC: 59 MG/DL
LDLC SERPL CALC-MCNC: 165 MG/DL
NONHDLC SERPL-MCNC: 198 MG/DL
TRIGL SERPL-MCNC: 164 MG/DL
TSH SERPL DL<=0.005 MIU/L-ACNC: 2.95 MU/L (ref 0.4–4)

## 2020-02-18 PROCEDURE — 80061 LIPID PANEL: CPT | Performed by: NURSE PRACTITIONER

## 2020-02-18 PROCEDURE — 84443 ASSAY THYROID STIM HORMONE: CPT | Performed by: NURSE PRACTITIONER

## 2020-02-18 PROCEDURE — 99396 PREV VISIT EST AGE 40-64: CPT | Performed by: NURSE PRACTITIONER

## 2020-02-18 PROCEDURE — 36415 COLL VENOUS BLD VENIPUNCTURE: CPT | Performed by: NURSE PRACTITIONER

## 2020-02-18 PROCEDURE — 83036 HEMOGLOBIN GLYCOSYLATED A1C: CPT | Performed by: NURSE PRACTITIONER

## 2020-02-18 PROCEDURE — 99213 OFFICE O/P EST LOW 20 MIN: CPT | Mod: 25 | Performed by: NURSE PRACTITIONER

## 2020-02-18 RX ORDER — MONTELUKAST SODIUM 10 MG/1
TABLET ORAL
Qty: 90 TABLET | Refills: 3 | Status: SHIPPED | OUTPATIENT
Start: 2020-02-18 | End: 2020-05-05

## 2020-02-18 RX ORDER — FLUTICASONE PROPIONATE 50 MCG
2 SPRAY, SUSPENSION (ML) NASAL DAILY
Qty: 16 G | Refills: 11 | Status: SHIPPED | OUTPATIENT
Start: 2020-02-18 | End: 2020-06-23

## 2020-02-18 RX ORDER — AZELASTINE 1 MG/ML
SPRAY, METERED NASAL
Qty: 30 ML | Refills: 11 | Status: SHIPPED | OUTPATIENT
Start: 2020-02-18 | End: 2020-05-05

## 2020-02-18 RX ORDER — BUDESONIDE AND FORMOTEROL FUMARATE DIHYDRATE 160; 4.5 UG/1; UG/1
AEROSOL RESPIRATORY (INHALATION)
Qty: 10.2 G | Refills: 11 | Status: SHIPPED | OUTPATIENT
Start: 2020-02-18 | End: 2020-05-05

## 2020-02-18 ASSESSMENT — ENCOUNTER SYMPTOMS
SORE THROAT: 1
SHORTNESS OF BREATH: 0
FREQUENCY: 0
WEAKNESS: 0
PALPITATIONS: 0
DIZZINESS: 0
CONSTIPATION: 0
MYALGIAS: 1
PARESTHESIAS: 0
NAUSEA: 0
JOINT SWELLING: 0
HEMATOCHEZIA: 0
COUGH: 0
ARTHRALGIAS: 1
EYE PAIN: 0
DYSURIA: 0
DIARRHEA: 0
CHILLS: 0
HEMATURIA: 0
NERVOUS/ANXIOUS: 0
HEARTBURN: 0
FEVER: 0
HEADACHES: 0
BREAST MASS: 0
ABDOMINAL PAIN: 0

## 2020-02-18 ASSESSMENT — MIFFLIN-ST. JEOR: SCORE: 1590.35

## 2020-02-18 NOTE — PATIENT INSTRUCTIONS
1. Knees:  -Do PT religiously in April  -Voltaren gel and/or CBD cream to knees  -Try to stay active    2. See Marga butts to continue the Phentermine.  Eat a protein bar with at least 20-30g protein in it as well as 1L of water starting at 6pm      Preventive Health Recommendations  Female Ages 40 to 49    Yearly exam:     See your health care provider every year in order to  1. Review health changes.   2. Discuss preventive care.    3. Review your medicines if your doctor prescribed any.      Get a Pap test every three years (unless you have an abnormal result and your provider advises testing more often).      If you get Pap tests with HPV test, you only need to test every 5 years, unless you have an abnormal result. You do not need a Pap test if your uterus was removed (hysterectomy) and you have not had cancer.      You should be tested each year for STDs (sexually transmitted diseases), if you're at risk.     Ask your doctor if you should have a mammogram.      Have a colonoscopy (test for colon cancer) if someone in your family has had colon cancer or polyps before age 50.       Have a cholesterol test every 5 years.       Have a diabetes test (fasting glucose) after age 45. If you are at risk for diabetes, you should have this test every 3 years.    Shots: Get a flu shot each year. Get a tetanus shot every 10 years.     Nutrition:     Eat at least 5 servings of fruits and vegetables each day.    Eat whole-grain bread, whole-wheat pasta and brown rice instead of white grains and rice.    Get adequate Calcium and Vitamin D.      Lifestyle    Exercise at least 150 minutes a week (an average of 30 minutes a day, 5 days a week). This will help you control your weight and prevent disease.    Limit alcohol to one drink per day.    No smoking.     Wear sunscreen to prevent skin cancer.    See your dentist every six months for an exam and cleaning.

## 2020-02-18 NOTE — PROGRESS NOTES
SUBJECTIVE:   CC: Pam Gaviria is an 49 year old woman who presents for preventive health visit.     Healthy Habits:     Getting at least 3 servings of Calcium per day:  Yes    Bi-annual eye exam:  Yes    Dental care twice a year:  Yes    Sleep apnea or symptoms of sleep apnea:  None    Diet:  Regular (no restrictions)    Frequency of exercise:  2-3 days/week    Duration of exercise:  30-45 minutes    Taking medications regularly:  Yes    Medication side effects:  None    PHQ-2 Total Score: 0    Additional concerns today:  Yes    Concerns today:   PAIN IN RIGHT LEG CONTINUING    -------------------------------------    Today's PHQ-2 Score:   PHQ-2 ( 1999 [employer]) 2/17/2020   Q1: Little interest or pleasure in doing things 0   Q2: Feeling down, depressed or hopeless 0   PHQ-2 Score 0   Q1: Little interest or pleasure in doing things Not at all   Q2: Feeling down, depressed or hopeless Not at all   PHQ-2 Score 0     Abuse: Current or Past(Physical, Sexual or Emotional)- No  Do you feel safe in your environment? Yes    Social History     Tobacco Use     Smoking status: Never Smoker     Smokeless tobacco: Never Used   Substance Use Topics     Alcohol use: Yes     Frequency: 2-4 times a month     Drinks per session: 1 or 2     Binge frequency: Never     Comment: 1 time evry 3 months, 1 per time       Alcohol Use 2/17/2020   Prescreen: >3 drinks/day or >7 drinks/week? No   Prescreen: >3 drinks/day or >7 drinks/week? -     Reviewed orders with patient.  Reviewed health maintenance and updated orders accordingly -     Lab work is in process    Mammogram Screening: Patient under age 50, mutual decision reflected in health maintenance.      Pertinent mammograms are reviewed under the imaging tab.  History of abnormal Pap smear: NO - age 30-65 PAP every 5 years with negative HPV co-testing recommended  PAP / HPV Latest Ref Rng & Units 5/12/2017   PAP - OTHER-NIL, See Result   HPV 16 DNA NEG Negative   HPV 18 DNA NEG  "Negative   OTHER HR HPV NEG Negative     Reviewed and updated as needed this visit by clinical staff  Tobacco  Allergies  Med Hx  Surg Hx  Fam Hx  Soc Hx        Reviewed and updated as needed this visit by Provider            Review of Systems   Constitutional: Negative for chills and fever.   HENT: Positive for congestion and sore throat. Negative for ear pain and hearing loss.    Eyes: Negative for pain and visual disturbance.   Respiratory: Negative for cough and shortness of breath.    Cardiovascular: Negative for chest pain, palpitations and peripheral edema.   Gastrointestinal: Negative for abdominal pain, constipation, diarrhea, heartburn, hematochezia and nausea.   Breasts:  Negative for tenderness, breast mass and discharge.   Genitourinary: Negative for dysuria, frequency, genital sores, hematuria, pelvic pain, urgency, vaginal bleeding and vaginal discharge.   Musculoskeletal: Positive for arthralgias and myalgias. Negative for joint swelling.   Skin: Negative for rash.   Neurological: Negative for dizziness, weakness, headaches and paresthesias.   Psychiatric/Behavioral: Negative for mood changes. The patient is not nervous/anxious.         OBJECTIVE:   /64 (BP Location: Right arm, Cuff Size: Adult Large)   Pulse 86   Temp 97.7  F (36.5  C) (Tympanic)   Resp 16   Ht 1.568 m (5' 1.75\")   Wt 101.1 kg (222 lb 14.4 oz)   LMP 02/08/2020 (Exact Date)   SpO2 99%   BMI 41.10 kg/m    Physical Exam  GENERAL: healthy, alert and no distress  EYES: Eyes grossly normal to inspection, PERRL and conjunctivae and sclerae normal  HENT: ear canals and TM's normal, nose and mouth without ulcers or lesions  NECK: no adenopathy, no asymmetry, masses, or scars and thyroid normal to palpation  RESP: lungs clear to auscultation - no rales, rhonchi or wheezes  CV: regular rate and rhythm, normal S1 S2, no S3 or S4, no murmur, click or rub, no peripheral edema and peripheral pulses strong  ABDOMEN: soft, " nontender, no hepatosplenomegaly, no masses and bowel sounds normal  MS: no gross musculoskeletal defects noted, no edema  SKIN: no suspicious lesions or rashes  NEURO: Normal strength and tone, mentation intact and speech normal  PSYCH: mentation appears normal, affect normal/bright    Diagnostic Test Results:  Labs reviewed in Epic    ASSESSMENT/PLAN:   1. Routine general medical examination at a health care facility  - TSH with free T4 reflex  - MA Diagnostic Digital Bilateral; Future  - Hemoglobin A1c  - Lipid panel reflex to direct LDL Non-fasting    2. Moderate persistent asthma without complication  Very well controlled.   - budesonide-formoterol (SYMBICORT) 160-4.5 MCG/ACT Inhaler; INHALE 2 PUFFS INTO THE LUNGS TWICE DAILY  Dispense: 10.2 g; Refill: 11  - montelukast (SINGULAIR) 10 MG tablet; TAKE 1 TABLET(10 MG) BY MOUTH AT BEDTIME  Dispense: 90 tablet; Refill: 3  - albuterol (PROAIR RESPICLICK) 108 (90 Base) MCG/ACT inhaler; Inhale 1-2 puffs into the lungs every 6 hours as needed for shortness of breath / dyspnea or wheezing Profile Rx: patient will contact pharmacy when needed  Dispense: 1 Inhaler; Refill: 11    3. Chronic rhinitis  Well controlled.   - azelastine (ASTELIN) 0.1 % nasal spray; USE 1 TO 2 SPRAYS IN EACH NOSTRIL TWICE DAILY  Dispense: 30 mL; Refill: 11  - fluticasone (FLONASE) 50 MCG/ACT nasal spray; Spray 2 sprays into both nostrils daily  Dispense: 16 g; Refill: 11  - montelukast (SINGULAIR) 10 MG tablet; TAKE 1 TABLET(10 MG) BY MOUTH AT BEDTIME  Dispense: 90 tablet; Refill: 3    4. Stress  Ongoing.   -Reviewed coping strategies. No depressive or SI sx.     5. Pain in both knees, unspecified chronicity  Ongoing. No swelling in the legs. I think this is a combination of arthritis (seen on XR) and deconditioning.  - [facility]; Future  - diclofenac (VOLTAREN) 1 % topical gel; Place 2-4 g onto the skin 4 times daily  Dispense: 100 g; Refill: 11  -If not improving  "gin 8 weeks or for new/worsening sx she should seek care.    COUNSELING:  Reviewed preventive health counseling, as reflected in patient instructions    Estimated body mass index is 41.1 kg/m  as calculated from the following:    Height as of this encounter: 1.568 m (5' 1.75\").    Weight as of this encounter: 101.1 kg (222 lb 14.4 oz).    Weight management plan: Discussed healthy diet and exercise guidelines     reports that she has never smoked. She has never used smokeless tobacco.      Counseling Resources:  ATP IV Guidelines  Pooled Cohorts Equation Calculator  Breast Cancer Risk Calculator  FRAX Risk Assessment  ICSI Preventive Guidelines  Dietary Guidelines for Americans, 2010  USDA's MyPlate  ASA Prophylaxis  Lung CA Screening    Jackelin Parker, JOSEFINA WILSON  [facility] [place]  "

## 2020-02-19 ENCOUNTER — TELEPHONE (OUTPATIENT)
Dept: PEDIATRICS | Facility: CLINIC | Age: 50
End: 2020-02-19

## 2020-02-19 NOTE — TELEPHONE ENCOUNTER
Prior Authorization Retail Medication Request    Medication/Dose: diclofenac (VOLTAREN) 1 % topical gel  ICD code (if different than what is on RX):   Pain in both knees, unspecified chronicity [M25.561, M25.562]       Previously Tried and Failed: NA  Rationale: NA    Insurance Name: [employer]   Insurance ID: [id number]    Pharmacy Information (if different than what is on RX)  [facility]   Phone: [phone]

## 2020-02-20 ENCOUNTER — TELEPHONE (OUTPATIENT)
Dept: PEDIATRICS | Facility: CLINIC | Age: 50
End: 2020-02-20

## 2020-02-20 NOTE — TELEPHONE ENCOUNTER
Prior Authorization Retail Medication Request    Medication/Dose: diclofenac (VOLTAREN) 1 % topical gel  ICD code (if different than what is on RX): Pain in both knees, unspecified chronicity [M25.561, M25.562]   Previously Tried and Failed: NA  Rationale: NA    Insurance Name: [employer]   Insurance ID: [id number]    Pharmacy Information (if different than what is on RX)  [facility]: [facility]   Phone: [phone]    Covermymeds:   Pham: [id number]

## 2020-02-21 NOTE — TELEPHONE ENCOUNTER
Prior Authorization Approval    Medication: diclofenac (VOLTAREN) 1 % topical gel - APPROVED was approved on 2/21/2020  Effective: 1/22/2020 to 2/20/2021  Reference #: [id number]  Approved Dose/Quantity:   Insurance Company: [employer] - Phone [phone] Fax [phone]  Expected CoPay:    Pharmacy Filling the Rx: [id number] - [place], [place] - [address]  AT [address]  Pharmacy Notified: Yes  Patient Notified: Comment:  **Instructed pharmacy to notify patient when script is ready to /ship.**

## 2020-02-21 NOTE — TELEPHONE ENCOUNTER
PA Initiation    Medication: diclofenac (VOLTAREN) 1 % topical gel -   Insurance Company: [employer] - Phone [phone] Fax [phone]  Pharmacy Filling the Rx: [id number] - [place], [place] - [address]  AT [address]  Filling Pharmacy Phone: [phone]  Filling Pharmacy Fax: [phone]  Start Date: 2/21/2020

## 2020-05-05 ENCOUNTER — MYC MEDICAL ADVICE (OUTPATIENT)
Dept: PEDIATRICS | Facility: CLINIC | Age: 50
End: 2020-05-05

## 2020-05-05 DIAGNOSIS — J45.40 MODERATE PERSISTENT ASTHMA WITHOUT COMPLICATION: ICD-10-CM

## 2020-05-05 DIAGNOSIS — J31.0 CHRONIC RHINITIS: ICD-10-CM

## 2020-05-05 RX ORDER — AZELASTINE 1 MG/ML
SPRAY, METERED NASAL
Qty: 90 ML | Refills: 4 | Status: SHIPPED | OUTPATIENT
Start: 2020-05-05 | End: 2020-12-16

## 2020-05-05 RX ORDER — MONTELUKAST SODIUM 10 MG/1
TABLET ORAL
Qty: 90 TABLET | Refills: 3 | Status: SHIPPED | OUTPATIENT
Start: 2020-05-05 | End: 2022-07-11

## 2020-05-05 RX ORDER — BUDESONIDE AND FORMOTEROL FUMARATE DIHYDRATE 160; 4.5 UG/1; UG/1
AEROSOL RESPIRATORY (INHALATION)
Qty: 30.6 G | Refills: 4 | Status: SHIPPED | OUTPATIENT
Start: 2020-05-05 | End: 2021-01-08

## 2020-05-05 NOTE — TELEPHONE ENCOUNTER
"Pt requesting alt pharmacy and change to Rx for 3 month supply.    Rx sent. Pt informed.      Requested Prescriptions   Signed Prescriptions Disp Refills    azelastine (ASTELIN) 0.1 % nasal spray 90 mL 4     Sig: USE 1 TO 2 SPRAYS IN EACH NOSTRIL TWICE DAILY   Last Written Prescription Date:  02/18/20  Last Fill Quantity: 30 mL,  # refills: 11   Last office visit: 2/18/2020 with prescribing provider:  Jackelin Parker   Future Office Visit:          There is no refill protocol information for this order       budesonide-formoterol (SYMBICORT) 160-4.5 MCG/ACT Inhaler 30.6 g 4     Sig: INHALE 2 PUFFS INTO THE LUNGS TWICE DAILY   Last Written Prescription Date:  02/18/20  Last Fill Quantity: 10.2 g,  # refills: 11   Last office visit: 2/18/2020 with prescribing provider:  Jackelin Parker   Future Office Visit:          There is no refill protocol information for this order          - Dequan \"Owen\" KARLI Haile - Patient Advocate Liason (PAL)  [facility]    "

## 2020-05-05 NOTE — TELEPHONE ENCOUNTER
"Request Rx sent to [facility] pharmacy.    Requested Prescriptions   Pending Prescriptions Disp Refills     montelukast (SINGULAIR) 10 MG tablet 90 tablet 3     Sig: TAKE 1 TABLET(10 MG) BY MOUTH AT BEDTIME   Last Written Prescription Date:  02/18/20  Last Fill Quantity: 90 tabs,  # refills: 4   Last office visit: 2/18/2020 with prescribing provider:  Jackelin Parker   Future Office Visit:              There is no refill protocol information for this order          - Dequan \"Owen\" KARLI Haile - Patient Advocate Liason (PAL)  [facility]    "

## 2020-06-22 NOTE — PROGRESS NOTES
Subjective     Pam Gaviria is a 49 year old female who presents to clinic today for the following health issues:    Needs ACT: done today  History of Present Illness        She eats 4 or more servings of fruits and vegetables daily.She consumes 1 sweetened beverage(s) daily.She exercises with enough effort to increase her heart rate 10 to 19 minutes per day.  She exercises with enough effort to increase her heart rate 3 or less days per week.   She is taking medications regularly.     ACT Total Scores 2019   ACT TOTAL SCORE - - -   ASTHMA ER VISITS - - -   ASTHMA HOSPITALIZATIONS - - -   ACT TOTAL SCORE (Goal Greater than or Equal to 20) 23 25 24   In the past 12 months, how many times did you visit the emergency room for your asthma without being admitted to the hospital? 0 0 0   In the past 12 months, how many times were you hospitalized overnight because of your asthma? 0 0 0     Patient here today for Nexplanon Removal. Spotting all the time, pain at site spmetimes. Feels pain moves.  Would like to do Nuva Ring instead.    Would like to start nuvaring. Has used this method before in the past and likes it. Has no plans to have kids any more.     History of asthma dn allergies, takes OTC allergy meds and inhaler, doing well. Would like refill of flonose.     Patient Active Problem List   Diagnosis     CARDIOVASCULAR SCREENING; LDL GOAL LESS THAN 160     Chronic rhinitis     Heel pain     Moderate persistent asthma     Latent tuberculosis     BMI 45.0-49.9, adult (H)     Right knee pain     DUB (dysfunctional uterine bleeding)     Past Surgical History:   Procedure Laterality Date      SECTION      times 2     CHOLECYSTECTOMY       MAMMOPLASTY REDUCTION BILATERAL Bilateral 2018    Procedure: MAMMOPLASTY REDUCTION BILATERAL;  MAMMOPLASTY REDUCTION BILATERAL ;  Surgeon: Maru Nguyen MD;  Location: [facility]     UPPER GI ENDOSCOPY         Social History     Tobacco Use  "    Smoking status: Never Smoker     Smokeless tobacco: Never Used   Substance Use Topics     Alcohol use: Yes     Frequency: 2-4 times a month     Drinks per session: 1 or 2     Binge frequency: Never     Comment: 1 time evry 3 months, 1 per time     Family History   Problem Relation Age of Onset     Thyroid Disease Mother      Heart Defect Mother         mitral valve prolapse           Reviewed and updated as needed this visit by Provider         Review of Systems   Constitutional, HEENT, cardiovascular, pulmonary, gi and gu systems are negative, except as otherwise noted.      Objective    /62 (BP Location: Right arm, Cuff Size: Adult Large)   Pulse 102   Temp 98.2  F (36.8  C) (Tympanic)   Resp 16   Ht 1.568 m (5' 1.75\")   Wt 101.7 kg (224 lb 4.8 oz)   SpO2 97%   BMI 41.36 kg/m    Body mass index is 41.36 kg/m .  Physical Exam   GENERAL: healthy, alert and no distress  SKIN: no suspicious lesions or rashes  PSYCH: mentation appears normal, affect normal/bright          Assessment & Plan     1. Nexplanon removal  2% LIDO with epi used to anesthetize the area with good anesthetic control. Area cleansed with iodine and #11 blade used to make a 2 mm incision of L upper inner aspect of arm at the lateral aspect of nexplanon. Nexplanon removed wtihout problems. Incision dressed with steristrips and a bandaid. Compression bandage to the upper arm. Instructed to keep on steristrips x 3 days and keep area dry that long. compressionb andage x 24 hrs. S/s infection reviewed. We discussed she should expect a menses within 1 month, but could take up to 6-9 months to cycle regularly. If she shouldn't get period within one month, she was instructed to take pregnancy test. Birth control discussed, nuvaring      2. Chronic rhinitis    - fluticasone (FLONASE) 50 MCG/ACT nasal spray; Spray 2 sprays into both nostrils daily  Dispense: 16 g; Refill: 11    3. Encounter for initial prescription of vaginal ring hormonal " contraceptive  Medication reviewed incluidng side effects, risks, benefits. tomas start asap. Declines STI screening.   - etonogestrel-ethinyl estradiol (NUVARING) 0.12-0.015 MG/24HR vaginal ring; Place 1 each vaginally every 28 days  Dispense: 3 each; Refill: prn       No follow-ups on file.    JOSEFINA Marcelino [facility] [place]

## 2020-06-23 ENCOUNTER — OFFICE VISIT (OUTPATIENT)
Dept: PEDIATRICS | Facility: CLINIC | Age: 50
End: 2020-06-23
Payer: COMMERCIAL

## 2020-06-23 VITALS
HEIGHT: 62 IN | OXYGEN SATURATION: 97 % | BODY MASS INDEX: 41.28 KG/M2 | RESPIRATION RATE: 16 BRPM | WEIGHT: 224.3 LBS | DIASTOLIC BLOOD PRESSURE: 62 MMHG | HEART RATE: 102 BPM | TEMPERATURE: 98.2 F | SYSTOLIC BLOOD PRESSURE: 106 MMHG

## 2020-06-23 DIAGNOSIS — J31.0 CHRONIC RHINITIS: ICD-10-CM

## 2020-06-23 DIAGNOSIS — Z30.015 ENCOUNTER FOR INITIAL PRESCRIPTION OF VAGINAL RING HORMONAL CONTRACEPTIVE: ICD-10-CM

## 2020-06-23 DIAGNOSIS — Z30.46 NEXPLANON REMOVAL: Primary | ICD-10-CM

## 2020-06-23 PROCEDURE — 99213 OFFICE O/P EST LOW 20 MIN: CPT | Performed by: NURSE PRACTITIONER

## 2020-06-23 RX ORDER — FLUTICASONE PROPIONATE 50 MCG
2 SPRAY, SUSPENSION (ML) NASAL DAILY
Qty: 16 G | Refills: 11 | Status: SHIPPED | OUTPATIENT
Start: 2020-06-23

## 2020-06-23 RX ORDER — ETONOGESTREL AND ETHINYL ESTRADIOL VAGINAL RING .015; .12 MG/D; MG/D
1 RING VAGINAL
Qty: 3 EACH | Status: SHIPPED | OUTPATIENT
Start: 2020-06-23 | End: 2020-09-21

## 2020-06-23 ASSESSMENT — MIFFLIN-ST. JEOR: SCORE: 1596.7

## 2020-06-24 ASSESSMENT — ASTHMA QUESTIONNAIRES: ACT_TOTALSCORE: 24

## 2020-08-10 DIAGNOSIS — J45.40 MODERATE PERSISTENT ASTHMA WITHOUT COMPLICATION: ICD-10-CM

## 2020-08-10 RX ORDER — BUDESONIDE AND FORMOTEROL FUMARATE DIHYDRATE 160; 4.5 UG/1; UG/1
AEROSOL RESPIRATORY (INHALATION)
Qty: 30.6 G | Refills: 4 | Status: CANCELLED | OUTPATIENT
Start: 2020-08-10

## 2020-08-11 ENCOUNTER — TELEPHONE (OUTPATIENT)
Dept: PEDIATRICS | Facility: CLINIC | Age: 50
End: 2020-08-11

## 2020-08-11 NOTE — TELEPHONE ENCOUNTER
Central Prior Authorization Team  Phone: [phone]    PA Initiation    Medication: Symbicort  Insurance Company: [employer] - Phone [phone] Fax [phone]  Pharmacy Filling the Rx: [id number] - [place], [place] - [address] AT [address] [facility]  Filling Pharmacy Phone: [phone]  Filling Pharmacy Fax:    Start Date: 8/11/2020

## 2020-08-11 NOTE — TELEPHONE ENCOUNTER
Prior Authorization Retail Medication Request    Medication/Dose: Symbicort 160-4.5 MCG/ACT   ICD code (if different than what is on RX):    Previously Tried and Failed:  [J45.40]  Rationale:  Moderate persistent asthma without complication     Insurance Name:     [employer]       Insurance ID:  [id number]       Pharmacy Information (if different than what is on RX)  [facility]   Phone:  [phone]

## 2020-08-13 NOTE — TELEPHONE ENCOUNTER
PRIOR AUTHORIZATION DENIED    Medication: Symbicort    Denial Date: 8/13/2020    Denial Rational: Brand symbicort is covered. Generic is a non-formulary medication under pt's plan and only covered when the formulary medication is not available in the market. Informed the pharmacy to process for brand and claim is paid.

## 2020-09-08 ENCOUNTER — MYC MEDICAL ADVICE (OUTPATIENT)
Dept: PEDIATRICS | Facility: CLINIC | Age: 50
End: 2020-09-08

## 2020-09-08 DIAGNOSIS — Z92.89 H/O TB SKIN TESTING: Primary | ICD-10-CM

## 2020-09-08 NOTE — TELEPHONE ENCOUNTER
The pt is aware and scheduled for her upcoming appointment.   Pavithra Sanchez on 9/8/2020 at 9:40 AM

## 2020-09-08 NOTE — TELEPHONE ENCOUNTER
Order placed - please call pt to schedule lab only at the same time as nurse only for flu vaccine.

## 2020-09-09 ENCOUNTER — ALLIED HEALTH/NURSE VISIT (OUTPATIENT)
Dept: NURSING | Facility: CLINIC | Age: 50
End: 2020-09-09
Payer: COMMERCIAL

## 2020-09-09 DIAGNOSIS — Z23 NEED FOR PROPHYLACTIC VACCINATION AND INOCULATION AGAINST INFLUENZA: Primary | ICD-10-CM

## 2020-09-09 DIAGNOSIS — Z92.89 H/O TB SKIN TESTING: ICD-10-CM

## 2020-09-09 PROCEDURE — 36415 COLL VENOUS BLD VENIPUNCTURE: CPT | Performed by: NURSE PRACTITIONER

## 2020-09-09 PROCEDURE — 86481 TB AG RESPONSE T-CELL SUSP: CPT | Performed by: NURSE PRACTITIONER

## 2020-09-09 PROCEDURE — 90471 IMMUNIZATION ADMIN: CPT

## 2020-09-09 PROCEDURE — 90686 IIV4 VACC NO PRSV 0.5 ML IM: CPT

## 2020-09-11 LAB
GAMMA INTERFERON BACKGROUND BLD IA-ACNC: 10 IU/ML
M TB IFN-G CD4+ BCKGRND COR BLD-ACNC: 0 IU/ML
M TB TUBERC IFN-G BLD QL: ABNORMAL
MITOGEN IGNF BCKGRD COR BLD-ACNC: 0 IU/ML
MITOGEN IGNF BCKGRD COR BLD-ACNC: 0 IU/ML

## 2020-09-12 ENCOUNTER — MYC MEDICAL ADVICE (OUTPATIENT)
Dept: PEDIATRICS | Facility: CLINIC | Age: 50
End: 2020-09-12

## 2020-09-12 DIAGNOSIS — R76.12 POSITIVE QUANTIFERON-TB GOLD TEST: Primary | ICD-10-CM

## 2020-09-15 DIAGNOSIS — Z92.89 H/O TB SKIN TESTING: ICD-10-CM

## 2020-09-15 DIAGNOSIS — R76.12 POSITIVE QUANTIFERON-TB GOLD TEST: Primary | ICD-10-CM

## 2020-09-15 PROCEDURE — 36415 COLL VENOUS BLD VENIPUNCTURE: CPT | Performed by: NURSE PRACTITIONER

## 2020-09-15 PROCEDURE — 86481 TB AG RESPONSE T-CELL SUSP: CPT | Performed by: NURSE PRACTITIONER

## 2020-09-17 LAB
GAMMA INTERFERON BACKGROUND BLD IA-ACNC: 2.63 IU/ML
M TB IFN-G CD4+ BCKGRND COR BLD-ACNC: 7.37 IU/ML
M TB TUBERC IFN-G BLD QL: POSITIVE
MITOGEN IGNF BCKGRD COR BLD-ACNC: 7.37 IU/ML
MITOGEN IGNF BCKGRD COR BLD-ACNC: 7.37 IU/ML

## 2020-09-18 DIAGNOSIS — R76.12 POSITIVE QUANTIFERON-TB GOLD TEST: ICD-10-CM

## 2020-09-18 PROCEDURE — 71046 X-RAY EXAM CHEST 2 VIEWS: CPT

## 2020-09-21 ENCOUNTER — VIRTUAL VISIT (OUTPATIENT)
Dept: PEDIATRICS | Facility: CLINIC | Age: 50
End: 2020-09-21
Payer: COMMERCIAL

## 2020-09-21 DIAGNOSIS — E66.01 MORBID OBESITY (H): ICD-10-CM

## 2020-09-21 DIAGNOSIS — N91.2 AMENORRHEA: ICD-10-CM

## 2020-09-21 DIAGNOSIS — Z22.7 LATENT TUBERCULOSIS: Primary | ICD-10-CM

## 2020-09-21 PROCEDURE — 99214 OFFICE O/P EST MOD 30 MIN: CPT | Mod: GT | Performed by: NURSE PRACTITIONER

## 2020-09-21 RX ORDER — ETONOGESTREL AND ETHINYL ESTRADIOL VAGINAL RING .015; .12 MG/D; MG/D
1 RING VAGINAL
Qty: 3 EACH | Status: CANCELLED | OUTPATIENT
Start: 2020-09-21

## 2020-09-21 RX ORDER — BUDESONIDE AND FORMOTEROL FUMARATE DIHYDRATE 160; 4.5 UG/1; UG/1
AEROSOL RESPIRATORY (INHALATION)
Qty: 30.6 G | Refills: 4 | Status: CANCELLED | OUTPATIENT
Start: 2020-09-21

## 2020-09-21 RX ORDER — RIFAMPIN 300 MG/1
600 CAPSULE ORAL DAILY
Qty: 180 CAPSULE | Refills: 1 | Status: SHIPPED | OUTPATIENT
Start: 2020-09-21 | End: 2021-01-19

## 2020-09-21 NOTE — PROGRESS NOTES
"Pam Gaviria is a 50 year old female who is being evaluated via a billable video visit.      The patient has been notified of following:     \"This video visit will be conducted via a call between you and your physician/provider. We have found that certain health care needs can be provided without the need for an in-person physical exam.  This service lets us provide the care you need with a video conversation.  If a prescription is necessary we can send it directly to your pharmacy.  If lab work is needed we can place an order for that and you can then stop by our lab to have the test done at a later time.    Video visits are billed at different rates depending on your insurance coverage.  Please reach out to your insurance provider with any questions.    If during the course of the call the physician/provider feels a video visit is not appropriate, you will not be charged for this service.\"    Patient has given verbal consent for Video visit? Yes  How would you like to obtain your AVS? MyChart  If you are dropped from the video visit, the video invite should be resent to: Text to cell phone: [phone]  Will anyone else be joining your video visit? No    Subjective     Pam Gaviria is a 50 year old female who presents today via video visit for the following health issues:    HPI  Discuss positive quantiferon result and xray results.  States she saw xray result on My Chart    Wants to discuss lack of menses - is wondering about menopause - not sure if she is having any symptoms  Has not used NuvaRing because of this - should she be?    Video Start Time: 3:09 PM    Review of Systems   Constitutional, HEENT, cardiovascular, pulmonary, gi and gu systems are negative, except as otherwise noted.      Objective           Vitals:  No vitals were obtained today due to virtual visit.    Physical Exam     GENERAL: Healthy, alert and no distress  EYES: Eyes grossly normal to inspection.  No discharge or erythema, or " "obvious scleral/conjunctival abnormalities.  RESP: No audible wheeze, cough, or visible cyanosis.  No visible retractions or increased work of breathing.    SKIN: Visible skin clear. No significant rash, abnormal pigmentation or lesions.  NEURO: Cranial nerves grossly intact.  Mentation and speech appropriate for age.  PSYCH: Mentation appears normal, affect normal/bright, judgement and insight intact, normal speech and appearance well-groomed.      No results found for any visits on 09/21/20.        Assessment & Plan   (Z22.7) Latent tuberculosis  (primary encounter diagnosis)  Comment: Diagnosed 9/2020. Had BCG as a child. Quant gold positive. States she is sure she has never had treatment for latent TB in the past. Started Rifampin September 21, 2020. Will monitor labs monthly until done. CXR yearly to monitor for active TB  Plan: Comprehensive metabolic panel (BMP + Alb, Alk         Phos, ALT, AST, Total. Bili, TP), CBC with         platelets differential, rifampin (RIFADIN) 300         MG capsule  -Reviewed r/b/se or rifampin  -Lab visits scheduled    (N91.2) Amenorrhea  Comment: NO period since getting nexplanon removed 3 months ago. Was menstruating on the nexplanon, but irregularly. Having mild hot flashes. Not sexually active in over 2 years. TSH normal.   Plan: Likely keira-menopausal.   -Continue to monitor. LMK if you get a period.  -Reviewed menopausal sx.            BMI:   Estimated body mass index is 41.36 kg/m  as calculated from the following:    Height as of 6/23/20: 1.568 m (5' 1.75\").    Weight as of 6/23/20: 101.7 kg (224 lb 4.8 oz).   Weight management plan: Patient referred to endocrine and/or weight management specialty      JOSEFINA Horne CNP  [facility] [place]      Video-Visit Details    Type of service:  Video Visit    Video End Time:3:31 PM    Originating Location (pt. Location): Home    Distant Location (provider location):  [facility] [place]     Platform used for " Video Visit: Lien

## 2020-09-22 ENCOUNTER — TELEPHONE (OUTPATIENT)
Dept: PEDIATRICS | Facility: CLINIC | Age: 50
End: 2020-09-22

## 2020-09-22 NOTE — TELEPHONE ENCOUNTER
Form received from mail service pharmacy notifying provider of potential drug interaction.    Provider must OK medication and sign.    Form must be faxed back to pharmacy service before they will fill.  Faxed to provider's doximity.  Michelle Fitzgerald CMA

## 2020-09-24 NOTE — TELEPHONE ENCOUNTER
Form signed and faxed to office via [employer].    Potential interaction of TB medication and nuvaring. No longer on nuvaring. Not sex active. Pls inform her that, if she does become sexually active, should use backup method because the nuvaring may not be effective while on TB meds.

## 2020-09-24 NOTE — TELEPHONE ENCOUNTER
Completed form received from provider and faxed to pharmacy.  My Chart message sent regarding drug interaction.    Michelle Fitzgerald, CMA

## 2020-10-23 DIAGNOSIS — Z22.7 LATENT TUBERCULOSIS: ICD-10-CM

## 2020-10-23 LAB
ALBUMIN SERPL-MCNC: 3.6 G/DL (ref 3.4–5)
ALP SERPL-CCNC: 84 U/L (ref 40–150)
ALT SERPL W P-5'-P-CCNC: 66 U/L (ref 0–50)
ANION GAP SERPL CALCULATED.3IONS-SCNC: 5 MMOL/L (ref 3–14)
AST SERPL W P-5'-P-CCNC: 50 U/L (ref 0–45)
BASOPHILS # BLD AUTO: 0 10E9/L (ref 0–0.2)
BASOPHILS NFR BLD AUTO: 0.3 %
BILIRUB SERPL-MCNC: 0.5 MG/DL (ref 0.2–1.3)
BUN SERPL-MCNC: 13 MG/DL (ref 7–30)
CALCIUM SERPL-MCNC: 9.2 MG/DL (ref 8.5–10.1)
CHLORIDE SERPL-SCNC: 107 MMOL/L (ref 94–109)
CO2 SERPL-SCNC: 28 MMOL/L (ref 20–32)
CREAT SERPL-MCNC: 1.04 MG/DL (ref 0.52–1.04)
DIFFERENTIAL METHOD BLD: NORMAL
EOSINOPHIL # BLD AUTO: 0.2 10E9/L (ref 0–0.7)
EOSINOPHIL NFR BLD AUTO: 2.5 %
ERYTHROCYTE [DISTWIDTH] IN BLOOD BY AUTOMATED COUNT: 13.3 % (ref 10–15)
GFR SERPL CREATININE-BSD FRML MDRD: 63 ML/MIN/{1.73_M2}
GLUCOSE SERPL-MCNC: 119 MG/DL (ref 70–99)
HCT VFR BLD AUTO: 40.4 % (ref 35–47)
HGB BLD-MCNC: 12.9 G/DL (ref 11.7–15.7)
LYMPHOCYTES # BLD AUTO: 2.6 10E9/L (ref 0.8–5.3)
LYMPHOCYTES NFR BLD AUTO: 36.3 %
MCH RBC QN AUTO: 29.9 PG (ref 26.5–33)
MCHC RBC AUTO-ENTMCNC: 31.9 G/DL (ref 31.5–36.5)
MCV RBC AUTO: 94 FL (ref 78–100)
MONOCYTES # BLD AUTO: 0.6 10E9/L (ref 0–1.3)
MONOCYTES NFR BLD AUTO: 8.2 %
NEUTROPHILS # BLD AUTO: 3.8 10E9/L (ref 1.6–8.3)
NEUTROPHILS NFR BLD AUTO: 52.7 %
PLATELET # BLD AUTO: 296 10E9/L (ref 150–450)
POTASSIUM SERPL-SCNC: 4.1 MMOL/L (ref 3.4–5.3)
PROT SERPL-MCNC: 7.6 G/DL (ref 6.8–8.8)
RBC # BLD AUTO: 4.31 10E12/L (ref 3.8–5.2)
SODIUM SERPL-SCNC: 140 MMOL/L (ref 133–144)
WBC # BLD AUTO: 7.2 10E9/L (ref 4–11)

## 2020-10-23 PROCEDURE — 80053 COMPREHEN METABOLIC PANEL: CPT | Performed by: NURSE PRACTITIONER

## 2020-10-23 PROCEDURE — 85025 COMPLETE CBC W/AUTO DIFF WBC: CPT | Performed by: NURSE PRACTITIONER

## 2020-10-23 PROCEDURE — 36415 COLL VENOUS BLD VENIPUNCTURE: CPT | Performed by: NURSE PRACTITIONER

## 2020-11-13 DIAGNOSIS — Z22.7 LATENT TUBERCULOSIS: ICD-10-CM

## 2020-11-13 LAB
ALBUMIN SERPL-MCNC: 3.7 G/DL (ref 3.4–5)
ALP SERPL-CCNC: 87 U/L (ref 40–150)
ALT SERPL W P-5'-P-CCNC: 55 U/L (ref 0–50)
ANION GAP SERPL CALCULATED.3IONS-SCNC: 1 MMOL/L (ref 3–14)
AST SERPL W P-5'-P-CCNC: 27 U/L (ref 0–45)
BASOPHILS # BLD AUTO: 0 10E9/L (ref 0–0.2)
BASOPHILS NFR BLD AUTO: 0.3 %
BILIRUB SERPL-MCNC: 0.6 MG/DL (ref 0.2–1.3)
BUN SERPL-MCNC: 10 MG/DL (ref 7–30)
CALCIUM SERPL-MCNC: 9.2 MG/DL (ref 8.5–10.1)
CHLORIDE SERPL-SCNC: 107 MMOL/L (ref 94–109)
CO2 SERPL-SCNC: 30 MMOL/L (ref 20–32)
CREAT SERPL-MCNC: 0.83 MG/DL (ref 0.52–1.04)
DIFFERENTIAL METHOD BLD: NORMAL
EOSINOPHIL # BLD AUTO: 0.2 10E9/L (ref 0–0.7)
EOSINOPHIL NFR BLD AUTO: 2.3 %
ERYTHROCYTE [DISTWIDTH] IN BLOOD BY AUTOMATED COUNT: 13.5 % (ref 10–15)
GFR SERPL CREATININE-BSD FRML MDRD: 83 ML/MIN/{1.73_M2}
GLUCOSE SERPL-MCNC: 94 MG/DL (ref 70–99)
HCT VFR BLD AUTO: 41.7 % (ref 35–47)
HGB BLD-MCNC: 13.5 G/DL (ref 11.7–15.7)
LYMPHOCYTES # BLD AUTO: 2.6 10E9/L (ref 0.8–5.3)
LYMPHOCYTES NFR BLD AUTO: 40 %
MCH RBC QN AUTO: 29.7 PG (ref 26.5–33)
MCHC RBC AUTO-ENTMCNC: 32.4 G/DL (ref 31.5–36.5)
MCV RBC AUTO: 92 FL (ref 78–100)
MONOCYTES # BLD AUTO: 0.6 10E9/L (ref 0–1.3)
MONOCYTES NFR BLD AUTO: 8.8 %
NEUTROPHILS # BLD AUTO: 3.2 10E9/L (ref 1.6–8.3)
NEUTROPHILS NFR BLD AUTO: 48.6 %
PLATELET # BLD AUTO: 275 10E9/L (ref 150–450)
POTASSIUM SERPL-SCNC: 4.2 MMOL/L (ref 3.4–5.3)
PROT SERPL-MCNC: 7.5 G/DL (ref 6.8–8.8)
RBC # BLD AUTO: 4.54 10E12/L (ref 3.8–5.2)
SODIUM SERPL-SCNC: 138 MMOL/L (ref 133–144)
WBC # BLD AUTO: 6.5 10E9/L (ref 4–11)

## 2020-11-13 PROCEDURE — 85025 COMPLETE CBC W/AUTO DIFF WBC: CPT | Performed by: NURSE PRACTITIONER

## 2020-11-13 PROCEDURE — 80053 COMPREHEN METABOLIC PANEL: CPT | Performed by: NURSE PRACTITIONER

## 2021-01-08 ENCOUNTER — TELEPHONE (OUTPATIENT)
Dept: PEDIATRICS | Facility: CLINIC | Age: 51
End: 2021-01-08

## 2021-01-08 DIAGNOSIS — J45.40 MODERATE PERSISTENT ASTHMA WITHOUT COMPLICATION: ICD-10-CM

## 2021-01-08 RX ORDER — BUDESONIDE AND FORMOTEROL FUMARATE DIHYDRATE 160; 4.5 UG/1; UG/1
AEROSOL RESPIRATORY (INHALATION)
Qty: 30.6 G | Refills: 4 | Status: SHIPPED | OUTPATIENT
Start: 2021-01-08 | End: 2021-04-27

## 2021-01-08 NOTE — TELEPHONE ENCOUNTER
Fax rec'd-Patient requesting Symbicort TYLOR, please advise if appropriate or not, prescription pended.

## 2021-01-08 NOTE — TELEPHONE ENCOUNTER
ACT Total Scores 1/8/2019 11/5/2019 6/23/2020   ACT TOTAL SCORE - - -   ASTHMA ER VISITS - - -   ASTHMA HOSPITALIZATIONS - - -   ACT TOTAL SCORE (Goal Greater than or Equal to 20) 23 25 24   In the past 12 months, how many times did you visit the emergency room for your asthma without being admitted to the hospital? 0 0 0   In the past 12 months, how many times were you hospitalized overnight because of your asthma? 0 0 0     Update act pls

## 2021-01-11 NOTE — TELEPHONE ENCOUNTER
"Sent [employer] message.    Awaiting pt response.      - Dequan \"Owen\" KARLI Haile - Patient Advocate Liason (PAL)  [facility]      "

## 2021-01-18 NOTE — TELEPHONE ENCOUNTER
Patient will complete ACT, will also let us know which steroid inhaler she would like since Symbicort will cost about $1000/month out of pocket,await response from patient,.

## 2021-02-28 ENCOUNTER — HEALTH MAINTENANCE LETTER (OUTPATIENT)
Age: 51
End: 2021-02-28

## 2021-04-24 ENCOUNTER — HEALTH MAINTENANCE LETTER (OUTPATIENT)
Age: 51
End: 2021-04-24

## 2021-04-29 ENCOUNTER — TELEPHONE (OUTPATIENT)
Dept: PEDIATRICS | Facility: CLINIC | Age: 51
End: 2021-04-29

## 2021-04-29 DIAGNOSIS — J45.40 MODERATE PERSISTENT ASTHMA WITHOUT COMPLICATION: Primary | ICD-10-CM

## 2021-04-29 NOTE — TELEPHONE ENCOUNTER
Fax rec'd-Insurance requesting to change Advair to Dulera as insurance states this is a covered alternative, spoke with patient and she is willing to try this if it is cheaper than Advair, please order.

## 2021-04-30 ENCOUNTER — TELEPHONE (OUTPATIENT)
Dept: PEDIATRICS | Facility: CLINIC | Age: 51
End: 2021-04-30

## 2021-04-30 DIAGNOSIS — J45.40 MODERATE PERSISTENT ASTHMA WITHOUT COMPLICATION: Primary | ICD-10-CM

## 2021-04-30 NOTE — TELEPHONE ENCOUNTER
[facility] calling-Advair Diskus is covered under patients insurance, Dulera is not, would cost patient over $800, please order Advair Diskus per patient request.

## 2021-09-03 ENCOUNTER — OFFICE VISIT (OUTPATIENT)
Dept: PEDIATRICS | Facility: CLINIC | Age: 51
End: 2021-09-03
Payer: COMMERCIAL

## 2021-09-03 VITALS
WEIGHT: 235.2 LBS | RESPIRATION RATE: 16 BRPM | HEART RATE: 81 BPM | OXYGEN SATURATION: 95 % | HEIGHT: 62 IN | TEMPERATURE: 97.6 F | DIASTOLIC BLOOD PRESSURE: 62 MMHG | BODY MASS INDEX: 43.28 KG/M2 | SYSTOLIC BLOOD PRESSURE: 102 MMHG

## 2021-09-03 DIAGNOSIS — E66.01 MORBID OBESITY (H): ICD-10-CM

## 2021-09-03 DIAGNOSIS — M25.561 CHRONIC PAIN OF RIGHT KNEE: ICD-10-CM

## 2021-09-03 DIAGNOSIS — Z13.1 SCREENING FOR DIABETES MELLITUS: ICD-10-CM

## 2021-09-03 DIAGNOSIS — Z00.00 ROUTINE GENERAL MEDICAL EXAMINATION AT A HEALTH CARE FACILITY: Primary | ICD-10-CM

## 2021-09-03 DIAGNOSIS — J30.81 ALLERGIC RHINITIS DUE TO ANIMALS: ICD-10-CM

## 2021-09-03 DIAGNOSIS — Z11.59 NEED FOR HEPATITIS C SCREENING TEST: ICD-10-CM

## 2021-09-03 DIAGNOSIS — Z12.11 SCREEN FOR COLON CANCER: ICD-10-CM

## 2021-09-03 DIAGNOSIS — G89.29 CHRONIC PAIN OF RIGHT KNEE: ICD-10-CM

## 2021-09-03 LAB
ALBUMIN SERPL-MCNC: 3.5 G/DL (ref 3.4–5)
ALP SERPL-CCNC: 101 U/L (ref 40–150)
ALT SERPL W P-5'-P-CCNC: 22 U/L (ref 0–50)
ANION GAP SERPL CALCULATED.3IONS-SCNC: 5 MMOL/L (ref 3–14)
AST SERPL W P-5'-P-CCNC: 18 U/L (ref 0–45)
BILIRUB SERPL-MCNC: 0.5 MG/DL (ref 0.2–1.3)
BUN SERPL-MCNC: 15 MG/DL (ref 7–30)
CALCIUM SERPL-MCNC: 9.6 MG/DL (ref 8.5–10.1)
CHLORIDE BLD-SCNC: 108 MMOL/L (ref 94–109)
CO2 SERPL-SCNC: 27 MMOL/L (ref 20–32)
CREAT SERPL-MCNC: 1.01 MG/DL (ref 0.52–1.04)
GFR SERPL CREATININE-BSD FRML MDRD: 65 ML/MIN/1.73M2
GLUCOSE BLD-MCNC: 92 MG/DL (ref 70–99)
HBA1C MFR BLD: 5.3 % (ref 0–5.6)
HCV AB SERPL QL IA: NONREACTIVE
POTASSIUM BLD-SCNC: 4.4 MMOL/L (ref 3.4–5.3)
PROT SERPL-MCNC: 7.3 G/DL (ref 6.8–8.8)
SODIUM SERPL-SCNC: 140 MMOL/L (ref 133–144)

## 2021-09-03 PROCEDURE — 90732 PPSV23 VACC 2 YRS+ SUBQ/IM: CPT | Performed by: NURSE PRACTITIONER

## 2021-09-03 PROCEDURE — 80053 COMPREHEN METABOLIC PANEL: CPT | Performed by: NURSE PRACTITIONER

## 2021-09-03 PROCEDURE — 99396 PREV VISIT EST AGE 40-64: CPT | Mod: 25 | Performed by: NURSE PRACTITIONER

## 2021-09-03 PROCEDURE — G0145 SCR C/V CYTO,THINLAYER,RESCR: HCPCS | Performed by: NURSE PRACTITIONER

## 2021-09-03 PROCEDURE — 90471 IMMUNIZATION ADMIN: CPT | Performed by: NURSE PRACTITIONER

## 2021-09-03 PROCEDURE — 36415 COLL VENOUS BLD VENIPUNCTURE: CPT | Performed by: NURSE PRACTITIONER

## 2021-09-03 PROCEDURE — 86803 HEPATITIS C AB TEST: CPT | Performed by: NURSE PRACTITIONER

## 2021-09-03 PROCEDURE — 83036 HEMOGLOBIN GLYCOSYLATED A1C: CPT | Performed by: NURSE PRACTITIONER

## 2021-09-03 PROCEDURE — 90472 IMMUNIZATION ADMIN EACH ADD: CPT | Performed by: NURSE PRACTITIONER

## 2021-09-03 PROCEDURE — 87624 HPV HI-RISK TYP POOLED RSLT: CPT | Performed by: NURSE PRACTITIONER

## 2021-09-03 PROCEDURE — 99214 OFFICE O/P EST MOD 30 MIN: CPT | Mod: 25 | Performed by: NURSE PRACTITIONER

## 2021-09-03 PROCEDURE — 90750 HZV VACC RECOMBINANT IM: CPT | Performed by: NURSE PRACTITIONER

## 2021-09-03 RX ORDER — PHENTERMINE HYDROCHLORIDE 37.5 MG/1
37.5 CAPSULE ORAL EVERY MORNING
Qty: 90 CAPSULE | Refills: 0 | Status: SHIPPED | OUTPATIENT
Start: 2021-09-03 | End: 2021-09-07

## 2021-09-03 ASSESSMENT — ENCOUNTER SYMPTOMS
MYALGIAS: 1
WEAKNESS: 0
CONSTIPATION: 1
HEARTBURN: 0
HEMATURIA: 0
PALPITATIONS: 0
NAUSEA: 0
SHORTNESS OF BREATH: 0
HEMATOCHEZIA: 0
ARTHRALGIAS: 1
NERVOUS/ANXIOUS: 0
DIZZINESS: 0
PARESTHESIAS: 0
DIARRHEA: 0
ABDOMINAL PAIN: 0
HEADACHES: 1
SORE THROAT: 0
DYSURIA: 0
EYE PAIN: 0
JOINT SWELLING: 1
BREAST MASS: 0
CHILLS: 0
FEVER: 0
FREQUENCY: 0
COUGH: 0

## 2021-09-03 ASSESSMENT — MIFFLIN-ST. JEOR: SCORE: 1640.11

## 2021-09-03 NOTE — PROGRESS NOTES
SUBJECTIVE:   CC: Pam Gaviria is an 51 year old woman who presents for preventive health visit.       Patient has been advised of split billing requirements and indicates understanding: Yes  Healthy Habits:     Getting at least 3 servings of Calcium per day:  Yes    Bi-annual eye exam:  Yes    Dental care twice a year:  Yes    Sleep apnea or symptoms of sleep apnea:  Daytime drowsiness and Excessive snoring    Diet:  Regular (no restrictions)    Frequency of exercise:  1 day/week    Duration of exercise:  30-45 minutes    Taking medications regularly:  Yes    Medication side effects:  None    PHQ-2 Total Score: 0    Additional concerns today:  Yes  1) Allergies  2)Knee pain/pressure/weakness    Today's PHQ-2 Score:   PHQ-2 ( 1999 Pfizer) 6/23/2020   Q1: Little interest or pleasure in doing things 0   Q2: Feeling down, depressed or hopeless 0   PHQ-2 Score 0   Q1: Little interest or pleasure in doing things -   Q2: Feeling down, depressed or hopeless -   PHQ-2 Score -       Abuse: Current or Past (Physical, Sexual or Emotional) - No  Do you feel safe in your environment? Yes    Have you ever done Advance Care Planning? (For example, a Health Directive, POLST, or a discussion with a medical provider or your loved ones about your wishes): No, advance care planning information given to patient to review.  Patient plans to discuss their wishes with loved ones or provider.      Social History     Tobacco Use     Smoking status: Never Smoker     Smokeless tobacco: Never Used   Substance Use Topics     Alcohol use: Yes     Comment: 1 time evry 3 months, 1 per time     If you drink alcohol do you typically have >3 drinks per day or >7 drinks per week? No    Alcohol Use 2/17/2020   Prescreen: >3 drinks/day or >7 drinks/week? No   Prescreen: >3 drinks/day or >7 drinks/week? -   v    Reviewed orders with patient.  Reviewed health maintenance and updated orders accordingly - Yes  Lab work is in process    Breast  Cancer Screening:  Any new diagnosis of family breast, ovarian, or bowel cancer? No    FHS-7: No flowsheet data found.  click delete button to remove this line now  Mammogram Screening: Recommended annual mammography  Pertinent mammograms are reviewed under the imaging tab.    History of abnormal Pap smear: NO - age 30-65 PAP every 5 years with negative HPV co-testing recommended  PAP / HPV Latest Ref Rng & Units 5/12/2017   PAP (Historical) - OTHER-NIL, See Result   HPV16 NEG Negative   HPV18 NEG Negative   HRHPV NEG Negative     Reviewed and updated as needed this visit by clinical staff                 Reviewed and updated as needed this visit by Provider                    Review of Systems   Constitutional: Negative for chills and fever.   HENT: Positive for congestion. Negative for ear pain, hearing loss and sore throat.    Eyes: Negative for pain and visual disturbance.   Respiratory: Negative for cough and shortness of breath.    Cardiovascular: Negative for chest pain, palpitations and peripheral edema.   Gastrointestinal: Positive for constipation. Negative for abdominal pain, diarrhea, heartburn, hematochezia and nausea.   Breasts:  Negative for tenderness, breast mass and discharge.   Genitourinary: Negative for dysuria, frequency, genital sores, hematuria, pelvic pain, urgency, vaginal bleeding and vaginal discharge.   Musculoskeletal: Positive for arthralgias, joint swelling and myalgias.   Skin: Negative for rash.   Neurological: Positive for headaches. Negative for dizziness, weakness and paresthesias.   Psychiatric/Behavioral: Positive for mood changes. The patient is not nervous/anxious.      CONSTITUTIONAL: NEGATIVE for fever, chills, change in weight  INTEGUMENTARY/SKIN: NEGATIVE for worrisome rashes, moles or lesions  EYES: NEGATIVE for vision changes or irritation  ENT: NEGATIVE for ear, mouth and throat problems  RESP: NEGATIVE for significant cough or SOB  BREAST: NEGATIVE for masses,  "tenderness or discharge  CV: NEGATIVE for chest pain, palpitations or peripheral edema  GI: NEGATIVE for nausea, abdominal pain, heartburn, or change in bowel habits  : NEGATIVE for unusual urinary or vaginal symptoms. No vaginal bleeding.  MUSCULOSKELETAL: NEGATIVE for significant arthralgias or myalgia  NEURO: NEGATIVE for weakness, dizziness or paresthesias  PSYCHIATRIC: NEGATIVE for changes in mood or affect      OBJECTIVE:   /62 (BP Location: Right arm, Patient Position: Chair, Cuff Size: Adult Large)   Pulse 81   Temp 97.6  F (36.4  C) (Tympanic)   Resp 16   Ht 1.575 m (5' 2\")   Wt 106.7 kg (235 lb 3.2 oz)   SpO2 95%   BMI 43.02 kg/m    Physical Exam  GENERAL: healthy, alert and no distress  EYES: Eyes grossly normal to inspection, PERRL and conjunctivae and sclerae normal  HENT: ear canals and TM's normal, nose and mouth without ulcers or lesions  NECK: no adenopathy, no asymmetry, masses, or scars and thyroid normal to palpation  RESP: lungs clear to auscultation - no rales, rhonchi or wheezes  CV: regular rate and rhythm, normal S1 S2, no S3 or S4, no murmur, click or rub, no peripheral edema and peripheral pulses strong  ABDOMEN: soft, nontender, no hepatosplenomegaly, no masses and bowel sounds normal   (female): normal female external genitalia, normal urethral meatus, vaginal mucosa pink, moist, well rugated, and normal cervix/adnexa/uterus without masses or discharge  MS: no gross musculoskeletal defects noted, no edema  SKIN: no suspicious lesions or rashes  NEURO: Normal strength and tone, mentation intact and speech normal  PSYCH: mentation appears normal, affect normal/bright    Diagnostic Test Results:  Labs reviewed in Epic    ASSESSMENT/PLAN:   (Z00.00) Routine general medical examination at a health care facility  (primary encounter diagnosis)  Plan: GLUCOSE, Hepatitis C Screen Reflex to HCV RNA         Quant and Genotype, MA SCREENING DIGITAL BILAT         - Future  (s+30), " "REVIEW OF HEALTH MAINTENANCE         PROTOCOL ORDERS, PPSV23, IM/SUBQ (2+ YRS) -         [id number], Comprehensive metabolic panel (BMP        + Alb, Alk Phos, ALT, AST, Total. Bili, TP),         ZOSTER VACCINE RECOMBINANT ADJUVANTED IM NJX,         Fecal colorectal cancer screen (FIT), Pap         screen with HPV - recommended age 30 - 65 years        (Z12.11) Screen for colon cancer  Comment: FIT    (Z11.59) Need for hepatitis C screening test  Plan: Hepatitis C Screen Reflex to HCV RNA Quant and         Genotype            (E66.01) Morbid obesity (H)  Comment: Marga Nichole Retired.   Plan: phentermine (ADIPEX-P) 37.5 MG capsule  -No contraindications. BP was ok on phentermine last time.  -Recheck in person visit 6 months    (M25.561,  G89.29) Chronic pain of right knee  Comment: Ogoing, feels a tightness  Plan: Orthopedic  Referral    (Z13.1) Screening for diabetes mellitus    (J30.81) Allergic rhinitis due to animals  Comment: Has a bird and a dog, which she is allergic to. Takes zyrtec, flonase, azelastin, singulair.  Plan:   -Get hepa filter every room in house  -Neti pot  -Declines allergy referral    Patient has been advised of split billing requirements and indicates understanding: Yes  COUNSELING:  Reviewed preventive health counseling, as reflected in patient instructions    Estimated body mass index is 41.36 kg/m  as calculated from the following:    Height as of 6/23/20: 1.568 m (5' 1.75\").    Weight as of 6/23/20: 101.7 kg (224 lb 4.8 oz).    Weight management plan: Phentermine    She reports that she has never smoked. She has never used smokeless tobacco.      Counseling Resources:  ATP IV Guidelines  Pooled Cohorts Equation Calculator  Breast Cancer Risk Calculator  BRCA-Related Cancer Risk Assessment: FHS-7 Tool  FRAX Risk Assessment  ICSI Preventive Guidelines  Dietary Guidelines for Americans, 2010  USDA's MyPlate  ASA Prophylaxis  Lung CA Screening    Jackelin Parker, APRN CNP  [facility] " [facility]

## 2021-09-03 NOTE — LETTER
My Asthma Action Plan    Name: Pam Gaviria   YOB: 1970  Date: 9/3/2021   My doctor: JOSEFINA Horne CNP   My clinic: [facility] [place]        My Control Medicine: advair, singulair, zytec, nasocort, astelien  My Rescue Medicine: Albuterol (Proair/Ventolin/Proventil HFA) 2-4 puffs EVERY 4 HOURS as needed. Use a spacer if recommended by your provider.   My Asthma Severity:   Moderate Persistent  Know your asthma triggers: exercise or sports and allergies  upper respiratory infections            GREEN ZONE   Good Control    I feel good    No cough or wheeze    Can work, sleep and play without asthma symptoms       Take your asthma control medicine every day.     1. If exercise triggers your asthma, take your rescue medication    15 minutes before exercise or sports, and    During exercise if you have asthma symptoms  2. Spacer to use with inhaler: If you have a spacer, make sure to use it with your inhaler             YELLOW ZONE Getting Worse  I have ANY of these:    I do not feel good    Cough or wheeze    Chest feels tight    Wake up at night   1. Keep taking your Green Zone medications  2. Start taking your rescue medicine:    every 20 minutes for up to 1 hour. Then every 4 hours for 24-48 hours.  3. If you stay in the Yellow Zone for more than 12-24 hours, contact your doctor.  4. If you do not return to the Green Zone in 12-24 hours or you get worse, start taking your oral steroid medicine if prescribed by your provider.           RED ZONE Medical Alert - Get Help  I have ANY of these:    I feel awful    Medicine is not helping    Breathing getting harder    Trouble walking or talking    Nose opens wide to breathe       1. Take your rescue medicine NOW  2. If your provider has prescribed an oral steroid medicine, start taking it NOW  3. Call your doctor NOW  4. If you are still in the Red Zone after 20 minutes and you have not reached your doctor:    Take your rescue  medicine again and    Call 911 or go to the emergency room right away    See your regular doctor within 2 weeks of an Emergency Room or Urgent Care visit for follow-up treatment.          Annual Reminders:  Meet with Asthma Educator,  Flu Shot in the Fall, consider Pneumonia Vaccination for patients with asthma (aged 19 and older).    Pharmacy:    [facility] - [place], [place] - [address]  [id number] - [place], [place] - [address] AT CORNER OF [facility]als in [place]  [facility] - [address]    Electronically signed by JOSEFINA Horne CNP   Date: 09/03/21                      Asthma Triggers  How To Control Things That Make Your Asthma Worse    Triggers are things that make your asthma worse.  Look at the list below to help you find your triggers and what you can do about them.  You can help prevent asthma flare-ups by staying away from your triggers.      Trigger                                                          What you can do   Cigarette Smoke  Tobacco smoke can make asthma worse. Do not allow smoking in your home, car or around you.  Be sure no one smokes at a child s day care or school.  If you smoke, ask your health care provider for ways to help you quit.  Ask family members to quit too.  Ask your health care provider for a referral to Quit Plan to help you quit smoking, or call 3-750-340-PLAN.     Colds, Flu, Bronchitis  These are common triggers of asthma. Wash your hands often.  Don t touch your eyes, nose or mouth.  Get a flu shot every year.     Dust Mites  These are tiny bugs that live in cloth or carpet. They are too small to see. Wash sheets and blankets in hot water every week.   Encase pillows and mattress in dust mite proof covers.  Avoid having carpet if you can. If you have carpet, vacuum weekly.   Use a dust mask and HEPA vacuum.   Pollen and Outdoor Mold  Some people are allergic to trees, grass, or  weed pollen, or molds. Try to keep your windows closed.  Limit time out doors when pollen count is high.   Ask you health care provider about taking medicine during allergy season.     Animal Dander  Some people are allergic to skin flakes, urine or saliva from pets with fur or feathers. Keep pets with fur or feathers out of your home.    If you can t keep the pet outdoors, then keep the pet out of your bedroom.  Keep the bedroom door closed.  Keep pets off cloth furniture and away from stuffed toys.     Mice, Rats, and Cockroaches   Some people are allergic to the waste from these pests.   Cover food and garbage.  Clean up spills and food crumbs.  Store grease in the refrigerator.   Keep food out of the bedroom.   Indoor Mold  This can be a trigger if your home has high moisture. Fix leaking faucets, pipes, or other sources of water.   Clean moldy surfaces.  Dehumidify basement if it is damp and smelly.   Smoke, Strong Odors, and Sprays  These can reduce air quality. Stay away from strong odors and sprays, such as perfume, powder, hair spray, paints, smoke incense, paint, cleaning products, candles and new carpet.   Exercise or Sports  Some people with asthma have this trigger. Be active!  Ask your doctor about taking medicine before sports or exercise to prevent symptoms.    Warm up for 5-10 minutes before and after sports or exercise.     Other Triggers of Asthma  Cold air:  Cover your nose and mouth with a scarf.  Sometimes laughing or crying can be a trigger.  Some medicines and food can trigger asthma.

## 2021-09-03 NOTE — PATIENT INSTRUCTIONS
HEPA filter in multiple rooms in the house    Track carbs for 3 days  Start phentermine  See me in 6 months for weight check  in      Preventive Health Recommendations  Female Ages 50 - 64    Yearly exam: See your health care provider every year in order to  o Review health changes.   o Discuss preventive care.    o Review your medicines if your doctor has prescribed any.      Get a Pap test every three years (unless you have an abnormal result and your provider advises testing more often).    If you get Pap tests with HPV test, you only need to test every 5 years, unless you have an abnormal result.     You do not need a Pap test if your uterus was removed (hysterectomy) and you have not had cancer.    You should be tested each year for STDs (sexually transmitted diseases) if you're at risk.     Have a mammogram every 1 to 2 years.    Have a colonoscopy at age 50, or have a yearly FIT test (stool test). These exams screen for colon cancer.      Have a cholesterol test every 5 years, or more often if advised.    Have a diabetes test (fasting glucose) every three years. If you are at risk for diabetes, you should have this test more often.     If you are at risk for osteoporosis (brittle bone disease), think about having a bone density scan (DEXA).    Shots: Get a flu shot each year. Get a tetanus shot every 10 years.    Nutrition:     Eat at least 5 servings of fruits and vegetables each day.    Eat whole-grain bread, whole-wheat pasta and brown rice instead of white grains and rice.    Get adequate Calcium and Vitamin D.     Lifestyle    Exercise at least 150 minutes a week (30 minutes a day, 5 days a week). This will help you control your weight and prevent disease.    Limit alcohol to one drink per day.    No smoking.     Wear sunscreen to prevent skin cancer.     See your dentist every six months for an exam and cleaning.    See your eye doctor every 1 to 2 years.

## 2021-09-04 ASSESSMENT — ASTHMA QUESTIONNAIRES: ACT_TOTALSCORE: 20

## 2021-09-07 ENCOUNTER — MYC MEDICAL ADVICE (OUTPATIENT)
Dept: PEDIATRICS | Facility: CLINIC | Age: 51
End: 2021-09-07

## 2021-09-07 DIAGNOSIS — E66.01 MORBID OBESITY (H): ICD-10-CM

## 2021-09-07 RX ORDER — PHENTERMINE HYDROCHLORIDE 37.5 MG/1
37.5 CAPSULE ORAL EVERY MORNING
Qty: 90 CAPSULE | Refills: 0 | Status: SHIPPED | OUTPATIENT
Start: 2021-09-07 | End: 2022-07-11

## 2021-09-07 NOTE — TELEPHONE ENCOUNTER
Patient sent [employer] message stating that [facility] prescription. She requested it be sent to her local [facility] pharmacy.     Routing refill request to provider for review/approval because:  Drug not on the [facility] refill protocol     Christy Lundberg RN on 9/7/2021 at 12:14 PM

## 2021-09-07 NOTE — PROGRESS NOTES
"Nurse Note      Itinerary:  [facility]      Departure Date: 10/17/21      Return Date: 12/7/21      Length of Trip: 1.5 months      Reason for Travel: Tourism           Urban or rural: urban      Accommodations: Hotel        IMMUNIZATION HISTORY  Have you received any immunizations within the past 4 weeks?  Yes  Have you ever fainted from having your blood drawn or from an injection?  No  Have you ever had a fever reaction to vaccination?  No  Have you ever had any bad reaction or side effect from any vaccination?  No  Have you ever had hepatitis A or B vaccine?  No  Do you live (or work closely) with anyone who has AIDS, an AIDS-like condition, any other immune disorder or who is on chemotherapy for cancer?  No  Do you have a family history of immunodeficiency?  No  Have you received any injection of immune globulin or any blood products during the past 12 months?  No    Patient roomed by CALLIE Ross Gaviria is a 51 year old female seen today with son and mother  for counsultation for international travel.   Patient will be departing in  5 week(s) and  traveling with family member(s).      Patient itinerary :  [place] [facility] ( Travel ADvisory Level 4) which risk for Yellow Fever, Dengue Fever, food borne illnesses, motor vehicle accidents and Covid. exposure.      Patient's activities will include Fencing competition of son    Patient's country [place] birth is [place]    Special medical concerns: none  Pre-travel questionnaire was completed by patient and reviewed by provider.     .     Vitals: /81   Pulse 82   Temp 97.8  F (36.6  C)   Resp 20   Ht 1.575 m (5' 2\")   Wt 105.6 kg (232 lb 14.4 oz)   SpO2 97%   BMI 42.60 kg/m    BMI= Body mass index is 42.6 kg/m .    EXAM:  General:  Well-nourished, well-developed in no acute distress.  Appears to be stated age, interacts appropriately and expresses understanding of information given to " patient.    Current Outpatient Medications   Medication Sig Dispense Refill     azithromycin (ZITHROMAX) 500 MG tablet Take 1 tablet (500 mg) by mouth daily for 3 doses Take 1 tablet a day for up to 3 days for severe diarrhea 3 tablet 0     albuterol (PROAIR RESPICLICK) 108 (90 Base) MCG/ACT inhaler Inhale 1-2 puffs into the lungs every 6 hours as needed for shortness of breath / dyspnea or wheezing Profile Rx: patient will contact pharmacy when needed 1 Inhaler 11     azelastine (ASTELIN) 0.1 % nasal spray USE 1 TO 2 SPRAYS IN EACH NOSTRIL TWICE DAILY 120 mL 3     cetirizine (ZYRTEC) 10 MG tablet Take 10 mg by mouth daily       fluticasone (FLONASE) 50 MCG/ACT nasal spray Spray 2 sprays into both nostrils daily 16 g 11     fluticasone-salmeterol (ADVAIR) 500-50 MCG/DOSE inhaler Inhale 1 puff into the lungs every 12 hours 1 each 11     montelukast (SINGULAIR) 10 MG tablet TAKE 1 TABLET(10 MG) BY MOUTH AT BEDTIME 90 tablet 3     phentermine (ADIPEX-P) 37.5 MG capsule Take 1 capsule (37.5 mg) by mouth every morning 90 capsule 0     Tetrahydrozoline-Zn Sulfate (EYE DROPS ALLERGY RELIEF OP)        UNABLE TO FIND MEDICATION NAME: Hair Skin and Nails supplement       Patient Active Problem List   Diagnosis     CARDIOVASCULAR SCREENING; LDL GOAL LESS THAN 160     Chronic rhinitis     Heel pain     Moderate persistent asthma     Latent tuberculosis     BMI 45.0-49.9, adult (H)     Right knee pain     DUB (dysfunctional uterine bleeding)     No Known Allergies      Immunizations discussed include:   Hepatitis A:  Ordered/given today, risks, benefits and side effects reviewed  Hepatitis B: low risk  Influenza: Ordered/given today, risks, benefits and side effects reviewed  Typhoid: Declined  Not concerned about risk of disease  Rabies: Not indicated  Yellow Fever: Yellow Fever ordered/given today - side effects, precautions, allergies, risks discussed. Patient expressed understanding.  Andorran Encephalitis: Not  indicated  Meningococcus: Not indicated  Tetanus/Diphtheria: Up to date  Measles/Mumps/Rubella: Up to date per report  Cholera: Not needed  Polio: Not indicated  Pneumococcal: Under age of 65  Varicella: Immune by disease history per patient report  Zostavax:  Not indicated  Shingrix: Up to date  HPV:  Not indicated  TB:  Latent TB    Altitude Exposure on this trip: no  Past tolerance to Altitude: NA    ASSESSMENT/PLAN:  Pam was seen today for travel clinic.    Diagnoses and all orders for this visit:    Travel advice encounter  -     azithromycin (ZITHROMAX) 500 MG tablet; Take 1 tablet (500 mg) by mouth daily for 3 doses Take 1 tablet a day for up to 3 days for severe diarrhea    Other orders  -     YELLOW FEVER IMMUNIZATN,LIVE,SUBCUT  -     HEPATITIS A VACCINE (ADULT)  -     Cancel: INFLUENZA VACCINE IM > 6 MONTHS VALENT IIV4 (AFLURIA/FLUZONE)  -     INFLUENZA QUAD, PF (RIV4) (FLUBLOK)      I have reviewed general recommendations for safe travel   including: food/water precautions, insect precautions, safer sex   practices given high prevalence of Zika, HIV and other STDs,   roadway safety. Educational materials and Travax report provided.    Malaraia prophylaxis recommended: none  Symptomatic treatment for traveler's diarrhea: azithromycin  Altitude illness prevention and treatment: NA      Evacuation insurance advised and resources were provided to patient.    Total visit time 30 minutes  with over 50% of time spent counseling patient as detailed above.    Silvina Ba CNP

## 2021-09-08 LAB
BKR LAB AP GYN ADEQUACY: NORMAL
BKR LAB AP GYN INTERPRETATION: NORMAL
BKR LAB AP HPV REFLEX: NORMAL
BKR LAB AP PREVIOUS ABNORMAL: NORMAL
PATH REPORT.COMMENTS IMP SPEC: NORMAL
PATH REPORT.RELEVANT HX SPEC: NORMAL

## 2021-09-09 ENCOUNTER — OFFICE VISIT (OUTPATIENT)
Dept: FAMILY MEDICINE | Facility: CLINIC | Age: 51
End: 2021-09-09
Payer: COMMERCIAL

## 2021-09-09 VITALS
RESPIRATION RATE: 20 BRPM | BODY MASS INDEX: 42.86 KG/M2 | HEIGHT: 62 IN | HEART RATE: 82 BPM | OXYGEN SATURATION: 97 % | WEIGHT: 232.9 LBS | DIASTOLIC BLOOD PRESSURE: 81 MMHG | TEMPERATURE: 97.8 F | SYSTOLIC BLOOD PRESSURE: 119 MMHG

## 2021-09-09 DIAGNOSIS — Z71.84 TRAVEL ADVICE ENCOUNTER: Primary | ICD-10-CM

## 2021-09-09 LAB
HUMAN PAPILLOMA VIRUS 16 DNA: NEGATIVE
HUMAN PAPILLOMA VIRUS 18 DNA: NEGATIVE
HUMAN PAPILLOMA VIRUS FINAL DIAGNOSIS: NORMAL
HUMAN PAPILLOMA VIRUS OTHER HR: NEGATIVE

## 2021-09-09 PROCEDURE — 90471 IMMUNIZATION ADMIN: CPT | Performed by: NURSE PRACTITIONER

## 2021-09-09 PROCEDURE — 90632 HEPA VACCINE ADULT IM: CPT | Mod: GA | Performed by: NURSE PRACTITIONER

## 2021-09-09 PROCEDURE — 99401 PREV MED CNSL INDIV APPRX 15: CPT | Mod: 25 | Performed by: NURSE PRACTITIONER

## 2021-09-09 PROCEDURE — 90472 IMMUNIZATION ADMIN EACH ADD: CPT | Mod: GA | Performed by: NURSE PRACTITIONER

## 2021-09-09 PROCEDURE — 90682 RIV4 VACC RECOMBINANT DNA IM: CPT | Performed by: NURSE PRACTITIONER

## 2021-09-09 PROCEDURE — 90717 YELLOW FEVER VACCINE SUBQ: CPT | Mod: GA | Performed by: NURSE PRACTITIONER

## 2021-09-09 RX ORDER — AZITHROMYCIN 500 MG/1
500 TABLET, FILM COATED ORAL DAILY
Qty: 3 TABLET | Refills: 0 | Status: SHIPPED | OUTPATIENT
Start: 2021-09-09 | End: 2021-09-12

## 2021-09-09 ASSESSMENT — MIFFLIN-ST. JEOR: SCORE: 1629.68

## 2021-09-09 NOTE — PATIENT INSTRUCTIONS
Thank you for visiting the [facility] : [phone]  Today September 9, 2021 you received the    Flu Vaccine    Hepatitis A Vaccine - Please return on 3/8/22 or later for your 2nd and final dose.    Yellow Fever (YF)      Follow up vaccine appointments can be made as a NURSE ONLY visit at the Travel Clinic, (BE PREPARED TO WAIT, ) or at designated [facility].    If you are receiving the Rabies vaccines series, it is important that you follow the exact schedule ordered.     Pre-travel     We recommend that you purchase Trip Evacuation Insurance prior to your departure.  https://wwwnc.cdc.gov/travel/page/insurance    Post-travel  contact your provider if you develop a fever, rash, cough, diarrhea or other symptoms.  Inform your healthcare provider when and where you traveled to.    Animal Exposure: Avoid all mammals even if they look healthy.  If there is a bite, scratch or even a lick, wash area immediately with soap and water for 15 minutes and seek medical care within 24 hours for evaluation of Rabies post exposure treatment.  Contact your Medical Evacuation Insurance.    COVID 19 (Sars Cov2) prevention strategies  Physical distancing: Maintain 6 foot (2m) from others.              Avoid large gatherings and public transportation.   Avoid indoor shopping malls, theaters and restaurants   Practice consistent mask wearing covering the nose, mouth and underneath the chin when unable to maintain 6 foot distance from others.  Hand washing: frequent, thorough handwashing with soap and water for 20 seconds (or using a hand  containing 60% alcohol)   Avoid touching face, nose, eyes, mouth unless you have done appropriate hand washing as above.   Clean high touch surfaces with approved disinfectant against Covid 19  (70% Ethanol ) or a bleach solution (add 20 mL (4 teaspoons) of bleach to 1 L (1 quart) of water;)  Be careful not to breath or touch bleach.       Travel Covid 19 Testing:  updated 05/01/2021  International travelers: Pre-travel: diagnostic testing (antigen or PCR) as required for each country for entry:  See the Embassy websites or airline websites.    [place] Requirements: All air passengers coming to the [place], including [place] citizens, are required to have a negative COVID-19 test result (within 3 calendar days) even if vaccinated or documentation of recovery from COVID-19 before they board a flight to the [place].    Post travel: CDC recommends getting tested 3-5 days after your trip AND stay home and self-quarantine for 7 days. Even if you test negative, stay home and self-quarantine for the full 7 days. If you don t get tested, stay home and self-quarantine for 10 days.    COVID-19 testing for pre-travel through [facility]  [phone] (Must have an order)    Please call the [facility] with any questions [phone]  Or send your provider a 'My Chart' note.

## 2021-09-09 NOTE — NURSING NOTE
Prior to immunization administration, verified patients identity using patient s name and date of birth. Please see Immunization Activity for additional information.     Screening Questionnaire for Adult Immunization    Are you sick today?   No   Do you have allergies to medications, food, a vaccine component or latex?   No   Have you ever had a serious reaction after receiving a vaccination?   No   Do you have a long-term health problem with heart, lung, kidney, or metabolic disease (e.g., diabetes), asthma, a blood disorder, no spleen, complement component deficiency, a cochlear implant, or a spinal fluid leak?  Are you on long-term aspirin therapy?   No   Do you have cancer, leukemia, HIV/AIDS, or any other immune system problem?   No   Do you have a parent, brother, or sister with an immune system problem?   No   In the past 3 months, have you taken medications that affect  your immune system, such as prednisone, other steroids, or anticancer drugs; drugs for the treatment of rheumatoid arthritis, Crohn s disease, or psoriasis; or have you had radiation treatments?   No   Have you had a seizure, or a brain or other nervous system problem?   No   During the past year, have you received a transfusion of blood or blood    products, or been given immune (gamma) globulin or antiviral drug?   No   For women: Are you pregnant or is there a chance you could become       pregnant during the next month?   No   Have you received any vaccinations in the past 4 weeks?   No     Immunization questionnaire answers were all negative.        Per orders of JOSEFINA Walton CNP, injection of Flublok, Havrix, YF-VAX given by Vandana Banerjee MA. Patient instructed to remain in clinic for 15 minutes afterwards, and to report any adverse reaction to me immediately.       Screening performed by Vandana Banerjee MA on 9/9/2021 at 5:56 PM.  Vandana Banerjee MA on 9/9/2021 at 5:57 PM

## 2021-11-01 ENCOUNTER — TELEPHONE (OUTPATIENT)
Dept: PEDIATRICS | Facility: CLINIC | Age: 51
End: 2021-11-01
Payer: COMMERCIAL

## 2021-11-01 NOTE — TELEPHONE ENCOUNTER
Patient Quality Outreach    Patient is due/failing the following:   Colon Cancer Screening -  FIT  Breast Cancer Screening - Mammogram    NEXT STEPS:   Schedule a mammogram, return FIT test    Type of outreach:    Sent [employer] message.    Questions for provider review:    None     Michelle Fitzgerald CMA  Chart routed to Care Team.

## 2021-11-04 NOTE — TELEPHONE ENCOUNTER
Patient Quality Outreach    Patient Quality Outreach 2nd Attempt    Summary:    Type of outreach:    Sent letter.    Next Steps:  Reach out within 90 days via Phone.    Max number of attempts reached: No. Will try again in 90 days if patient still on fail list.    Questions for provider review:    None                                                                                                                    Michelle Fitzgerald CMA    Chart routed to Care Team.

## 2021-11-11 ENCOUNTER — LAB (OUTPATIENT)
Dept: LAB | Facility: CLINIC | Age: 51
End: 2021-11-11
Payer: COMMERCIAL

## 2021-11-11 PROCEDURE — 82274 ASSAY TEST FOR BLOOD FECAL: CPT | Performed by: NURSE PRACTITIONER

## 2021-11-12 NOTE — TELEPHONE ENCOUNTER
Patient Quality Outreach    Patient Quality Outreach 3rd Attempt      Summary:    Type of outreach:    Phone, left message for patient/parent to call back.    Max number of attempts reached: Yes. Will try again in 90 days if patient still on fail list.    Questions for provider review:    None                                                                                                                    Michelle Fitzgerald CMA    Chart closed.

## 2021-11-15 LAB — HEMOCCULT STL QL IA: NEGATIVE

## 2021-12-17 ENCOUNTER — MYC MEDICAL ADVICE (OUTPATIENT)
Dept: PEDIATRICS | Facility: CLINIC | Age: 51
End: 2021-12-17
Payer: COMMERCIAL

## 2021-12-17 NOTE — TELEPHONE ENCOUNTER
Patient will go to local pharmacy for shingles #2, didn't want to wait until Jan 2022 for booster, chart closed.

## 2022-01-05 ENCOUNTER — OFFICE VISIT (OUTPATIENT)
Dept: ORTHOPEDICS | Facility: CLINIC | Age: 52
End: 2022-01-05
Payer: COMMERCIAL

## 2022-01-05 ENCOUNTER — ANCILLARY PROCEDURE (OUTPATIENT)
Dept: GENERAL RADIOLOGY | Facility: CLINIC | Age: 52
End: 2022-01-05
Attending: STUDENT IN AN ORGANIZED HEALTH CARE EDUCATION/TRAINING PROGRAM
Payer: COMMERCIAL

## 2022-01-05 VITALS
HEIGHT: 62 IN | DIASTOLIC BLOOD PRESSURE: 86 MMHG | BODY MASS INDEX: 40.48 KG/M2 | SYSTOLIC BLOOD PRESSURE: 124 MMHG | WEIGHT: 220 LBS

## 2022-01-05 DIAGNOSIS — M25.561 CHRONIC PAIN OF RIGHT KNEE: ICD-10-CM

## 2022-01-05 DIAGNOSIS — M17.11 PRIMARY OSTEOARTHRITIS OF RIGHT KNEE: ICD-10-CM

## 2022-01-05 DIAGNOSIS — S83.241A ACUTE MEDIAL MENISCUS TEAR OF RIGHT KNEE, INITIAL ENCOUNTER: ICD-10-CM

## 2022-01-05 DIAGNOSIS — M25.561 CHRONIC PAIN OF RIGHT KNEE: Primary | ICD-10-CM

## 2022-01-05 DIAGNOSIS — G89.29 CHRONIC PAIN OF RIGHT KNEE: Primary | ICD-10-CM

## 2022-01-05 DIAGNOSIS — G89.29 CHRONIC PAIN OF RIGHT KNEE: ICD-10-CM

## 2022-01-05 PROCEDURE — 73562 X-RAY EXAM OF KNEE 3: CPT | Performed by: RADIOLOGY

## 2022-01-05 PROCEDURE — 99204 OFFICE O/P NEW MOD 45 MIN: CPT | Performed by: STUDENT IN AN ORGANIZED HEALTH CARE EDUCATION/TRAINING PROGRAM

## 2022-01-05 ASSESSMENT — MIFFLIN-ST. JEOR: SCORE: 1571.16

## 2022-01-05 NOTE — PROGRESS NOTES
ASSESSMENT & PLAN    1. Chronic pain of right knee    2. Primary osteoarthritis of right knee    3. Acute medial meniscus tear of right knee, initial encounter      Pam Gaviria is a 51 year old female presenting for evaluation of acute on chronic knee pain with injury 6 weeks ago. History, examination and imaging findings were reviewed today, consistent with osteoarthritis of the knee with suspected complex medial meniscus tear. Differential diagnosis includes insufficiency fracture. Ligamentous structures are intact on examination.    - Your right knee MRI has been ordered. You may call [phone] to schedule over the phone. Once you know the date of your MRI, please call my office ([phone]) and schedule a follow-up visit for 2 days after that to discuss results.  - Ice the knee for 10-15 minutes 3-4 times per day as needed.  - Activity as tolerated based on pain.     Over-the-counter options to try:   - Turmeric with black pepper 1000 mg twice daily. This is an herbal anti-inflammatory supplement that has been found to be equally effective as Ibuprofen for treatment of knee arthritis pain and inflammation. We recommend the brand Pure Encapsulations.   - Glucosamine/chondroitin or fish oil can also be helpful for management of arthritis pain.  - Voltaren (diclofenac) gel is a topical NSAID medication (like ibuprofen) available over-the-counter that can be very effective for arthritis pain and inflammation. This may be applied to the painful joint 4 times per day.  - Tylenol, 2 tablets (1,000 mg) three times per day, not to exceed 3,000 mg per day.    You may call our direct clinic number ([phone]) at any time with questions or concerns.    Kajal Hernandes MD, [facility]          -----    SUBJECTIVE  Pam Gaviria is a/an 51 year old female who is seen as a self referral for evaluation of right knee pain. The patient is seen by  themselves.    Onset: 3.5 years(s) ago. Reports insidious onset without acute precipitating event. Patient reports that ~ 1 month ago (11/28/21) she injured her right knee while walking down stairs into a pool in [facility] prior to traveling to [facility]. She notes being unable to see the bottom step which was very high and landed very hard on the right leg. She notes intermittent pain since then.  Location of Pain: right anterior, medial and lateral knee  Rating of Pain at worst: 10/10  Rating of Pain Currently: 1/10  Worsened by: prolonged walking, going up and down stairs  Better with: rest / activity avoidance  Treatments tried: rest/activity avoidance, ice, heat, previous imaging (xray 5/15/18) and Voltaren gel, menthol topical oil, physical therapy (2018)  Associated symptoms: sharp, tightness, pressure on knee, swelling, feeling of instability/weakness of right leg. The knee will buckle and give out. No locking.   Orthopedic history: YES - chronic right knee pain. History of left knee ACL rupture years ago.  Relevant surgical history: NO  Social history:  works - desk job    Past Medical History:   Diagnosis Date     Arthritis      Chronic rhinitis      Heel pain      Latent tuberculosis 07/13/2015    chronically postive TB test     Obese      Uncomplicated asthma     moderate persistent asthma     Social History     Socioeconomic History     Marital status:      Spouse name: Not on file     Number of children: Not on file     Years of education: Not on file     Highest education level: Master's degree (e.g., MA, MS, Tasia, MEd, MSW, ELOISA)   Occupational History     Not on file   Tobacco Use     Smoking status: Never Smoker     Smokeless tobacco: Never Used   Vaping Use     Vaping Use: Never used   Substance and Sexual Activity     Alcohol use: Yes     Comment: 1 time evry 3 months, 1 per time     Drug use: No     Sexual activity: Not Currently     Partners: Male     Birth control/protection:  "Inserts/Ring   Other Topics Concern     Parent/sibling w/ CABG, MI or angioplasty before 65F 55M? Not Asked   Social History Narrative     Not on file     Social Determinants of Health     Financial Resource Strain: Low Risk      Difficulty of Paying Living Expenses: Not hard at all   Food Insecurity: No Food Insecurity     Worried About Running Out of Food in the Last Year: Never true     Ran Out of Food in the Last Year: Never true   Transportation Needs: No Transportation Needs     Lack of Transportation (Medical): No     Lack of Transportation (Non-Medical): No   Physical Activity: Not on file   Stress: Not on file   Social Connections: Not on file   Intimate Partner Violence: Not on file   Housing Stability: Not on file         Patient's past medical, surgical, social, and family histories were reviewed today and no changes are noted.    REVIEW OF SYSTEMS:  10 point ROS is negative other than symptoms noted above in HPI, Past Medical History or as stated below  Constitutional: NEGATIVE for fever, chills, change in weight  Skin: NEGATIVE for worrisome rashes, moles or lesions  GI/: NEGATIVE for bowel or bladder changes  Neuro: NEGATIVE for weakness, dizziness or paresthesias    OBJECTIVE:  /86   Ht 1.575 m (5' 2\")   Wt 99.8 kg (220 lb)   BMI 40.24 kg/m     General: healthy, alert and in no distress  HEENT: no scleral icterus or conjunctival erythema  Skin: no suspicious lesions or rash. No jaundice.  CV: no pedal edema  Resp: normal respiratory effort without conversational dyspnea   Psych: normal mood and affect  Gait: antlagic gait with appropriate coordination and balance  Neuro: Normal light sensory exam of lower extremity  MSK:  RIGHT KNEE  Inspection:    normal alignment  Palpation:    Tender about the lateral patellar facet, medial patellar facet, patella tendon, lateral joint line and medial joint line. Remainder of bony and ligamentous landmarks are nontender.    Moderate effusion is " present    Patellofemoral crepitus is Present  Range of Motion:     00 extension to 1000 flexion  Strength:    Quadriceps 4+/5 and hamstrings 4+/5    Extensor mechanism intact  Special Tests:    Positive: Patellar grind, Carlos's, Thessaly's, Bounce    Negative: MCL/valgus stress (0 & 30 deg), LCL/varus stress (0 & 30 deg), Lachman's, anterior drawer, posterior drawer, posterior sag    Independent visualization of the below image:  Recent Results (from the past 24 hour(s))   XR Knee Standing AP Bilat [place] Bilat Lat Right    Narrative    KNEE STANDING AP BILATERAL [place] BILATERAL LATERAL RIGHT  1/5/2022  9:09 AM     HISTORY: Chronic pain of right knee.    COMPARISON: 5/15/2018.      Impression    IMPRESSION:   Right knee: Moderate medial and patellofemoral degenerative changes  more prominent than on the comparison study. No joint effusion.    Left knee: Moderate patellofemoral degenerative changes.         Kajal Hernandes MD, CAM  [facility]

## 2022-01-05 NOTE — PATIENT INSTRUCTIONS
1. Chronic pain of right knee    2. Primary osteoarthritis of right knee    3. Acute medial meniscus tear of right knee, initial encounter      Pam Gaviria is a 51 year old female presenting for evaluation of acute on chronic knee pain with injury 6 weeks ago. History, examination and imaging findings were reviewed today, consistent with osteoarthritis of the knee with suspected medial meniscus tear versus potential insufficiency fracture.   - Your right knee MRI has been ordered. You may call [phone] to schedule over the phone. Once you know the date of your MRI, please call my office ([phone]) and schedule a follow-up visit for 2 days after that to discuss results.  - Ice the knee for 10-15 minutes 3-4 times per day as needed.  - Activity as tolerated based on pain.     Over-the-counter options to try:   - Turmeric with black pepper 1000 mg twice daily. This is an herbal anti-inflammatory supplement that has been found to be equally effective as Ibuprofen for treatment of knee arthritis pain and inflammation. We recommend the brand Pure Encapsulations.   - Glucosamine/chondroitin or fish oil can also be helpful for management of arthritis pain.  - Voltaren (diclofenac) gel is a topical NSAID medication (like ibuprofen) available over-the-counter that can be very effective for arthritis pain and inflammation. This may be applied to the painful joint 4 times per day.  - Tylenol, 2 tablets (1,000 mg) three times per day, not to exceed 3,000 mg per day.    You may call our direct clinic number ([phone]) at any time with questions or concerns.    Kajal Hernandes MD, [facility]

## 2022-01-08 ENCOUNTER — HOSPITAL ENCOUNTER (OUTPATIENT)
Dept: MRI IMAGING | Facility: CLINIC | Age: 52
Discharge: HOME | End: 2022-01-08
Attending: STUDENT IN AN ORGANIZED HEALTH CARE EDUCATION/TRAINING PROGRAM | Admitting: STUDENT IN AN ORGANIZED HEALTH CARE EDUCATION/TRAINING PROGRAM
Payer: COMMERCIAL

## 2022-01-08 DIAGNOSIS — M25.561 CHRONIC PAIN OF RIGHT KNEE: ICD-10-CM

## 2022-01-08 DIAGNOSIS — G89.29 CHRONIC PAIN OF RIGHT KNEE: ICD-10-CM

## 2022-01-08 DIAGNOSIS — M17.11 PRIMARY OSTEOARTHRITIS OF RIGHT KNEE: ICD-10-CM

## 2022-01-08 DIAGNOSIS — S83.241A ACUTE MEDIAL MENISCUS TEAR OF RIGHT KNEE, INITIAL ENCOUNTER: ICD-10-CM

## 2022-01-08 PROCEDURE — 73721 MRI JNT OF LWR EXTRE W/O DYE: CPT | Mod: RT

## 2022-01-08 PROCEDURE — 73721 MRI JNT OF LWR EXTRE W/O DYE: CPT | Mod: 26 | Performed by: RADIOLOGY

## 2022-01-12 ENCOUNTER — OFFICE VISIT (OUTPATIENT)
Dept: ORTHOPEDICS | Facility: CLINIC | Age: 52
End: 2022-01-12
Payer: COMMERCIAL

## 2022-01-12 VITALS
BODY MASS INDEX: 40.48 KG/M2 | HEIGHT: 62 IN | SYSTOLIC BLOOD PRESSURE: 112 MMHG | WEIGHT: 220 LBS | DIASTOLIC BLOOD PRESSURE: 80 MMHG

## 2022-01-12 DIAGNOSIS — S83.231A COMPLEX TEAR OF MEDIAL MENISCUS OF RIGHT KNEE AS CURRENT INJURY, INITIAL ENCOUNTER: Primary | ICD-10-CM

## 2022-01-12 DIAGNOSIS — M94.261 CHONDROMALACIA OF RIGHT KNEE: ICD-10-CM

## 2022-01-12 PROCEDURE — 99213 OFFICE O/P EST LOW 20 MIN: CPT | Performed by: STUDENT IN AN ORGANIZED HEALTH CARE EDUCATION/TRAINING PROGRAM

## 2022-01-12 ASSESSMENT — MIFFLIN-ST. JEOR: SCORE: 1571.16

## 2022-01-12 NOTE — PATIENT INSTRUCTIONS
1. Complex tear of medial meniscus of right knee as current injury, initial encounter    2. Chondromalacia of right knee      Your right knee MRI was reviewed today, showing a complex medial meniscus tear, moderate chondromalacia (aka cartilage wear and tear) and low grade hamstring tendon tear.     Upon discussion of management options, plan to proceed with the following:  - Referral placed for physical therapy. They can fit you with a medial  brace as well, to be used as needed for prolonged walking or exacerbated symptoms.   - Activity as tolerated based on pain.     Over-the-counter options to try:   - Turmeric with black pepper 1000 mg twice daily. This is an herbal anti-inflammatory supplement that has been found to be equally effective as Ibuprofen for treatment of knee arthritis pain and inflammation. We recommend the brand Pure Encapsulations.   - Glucosamine/chondroitin or fish oil can also be helpful for management of arthritis pain.  - Voltaren (diclofenac) gel is a topical NSAID medication (like ibuprofen) available over-the-counter that can be very effective for arthritis pain and inflammation. This may be applied to the painful joint 4 times per day.  - Tylenol, 2 tablets (1,000 mg) three times per day, not to exceed 3,000 mg per day.    Please schedule a follow up appointment to see me in 6-8 weeks, or sooner as needed for persistence or worsening of pain. You may call our direct clinic number ([phone]) at any time with questions or concerns.    Kajal Hernandes MD, [facility]

## 2022-01-12 NOTE — PROGRESS NOTES
ASSESSMENT & PLAN    1. Complex tear of medial meniscus of right knee as current injury, initial encounter    2. Chondromalacia of right knee      Your right knee MRI was reviewed today, showing a complex medial meniscus tear, moderate chondromalacia (aka cartilage wear and tear) and low grade tear of the distal conjoined tendon.     Upon discussion of management options, plan to proceed with the following:  - Referral placed for physical therapy. They can fit you with a medial  brace as well, to be used as needed for prolonged walking or exacerbated symptoms.   - Activity as tolerated based on pain.     Over-the-counter options to try:   - Turmeric with black pepper 1000 mg twice daily. This is an herbal anti-inflammatory supplement that has been found to be equally effective as Ibuprofen for treatment of knee arthritis pain and inflammation. We recommend the brand Pure Encapsulations.   - Glucosamine/chondroitin or fish oil can also be helpful for management of arthritis pain.  - Voltaren (diclofenac) gel is a topical NSAID medication (like ibuprofen) available over-the-counter that can be very effective for arthritis pain and inflammation. This may be applied to the painful joint 4 times per day.  - Tylenol, 2 tablets (1,000 mg) three times per day, not to exceed 3,000 mg per day.    Please schedule a follow up appointment to see me in 6-8 weeks, or sooner as needed for persistence or worsening of pain. You may call our direct clinic number ([phone]) at any time with questions or concerns.    Kajal Hernandes MD, [facility]    -----    SUBJECTIVE:  Pam Gaviria is a 51 year old female who is seen in follow-up for right knee pain. They were last seen 1/5/2022.     Since their last visit reports a little bit of improvement. She still notes pain with transitioning from sit to stand and bending the knee. They indicate that their current pain level is  "3/10. They have tried rest/activity avoidance, ice, previous imaging (MRI 1/10/22) and Voltaren gel, Glucosamine, Tumeric, Advil.      The patient is seen by themselves.    Patient's past medical, surgical, social, and family histories were reviewed today and no changes are noted.    REVIEW OF SYSTEMS:  Constitutional: NEGATIVE for fever, chills, change in weight  Skin: NEGATIVE for worrisome rashes, moles or lesions  GI/: NEGATIVE for bowel or bladder changes  Neuro: NEGATIVE for weakness, dizziness or paresthesias    OBJECTIVE:  /80   Ht 1.575 m (5' 2\")   Wt 99.8 kg (220 lb)   BMI 40.24 kg/m     General: healthy, alert and in no distress  HEENT: no scleral icterus or conjunctival erythema  Skin: no suspicious lesions or rash. No jaundice.  CV: regular rhythm by palpation, no pedal edema  Resp: normal respiratory effort without conversational dyspnea   Psych: normal mood and affect  Gait: antalgic gait improved from previous with appropriate coordination and balance  Neuro: normal light touch sensory exam of the extremities.    MSK:  RIGHT KNEE  Inspection:    normal alignment  Palpation:    Tender about the medial and lateral joint lines. Remainder of bony and ligamentous landmarks are nontender.    Trace effusion is present    Patellofemoral crepitus is Present  Range of Motion:     00 extension to 1000 flexion  Strength:    Quadriceps 4+/5 and hamstrings 4+/5    Extensor mechanism intact    Independent visualization of the below image:  Results for orders placed or performed during the hospital encounter of 01/08/22   MR Knee Right w/o Contrast    Narrative    MR right knee without contrast 1/10/2022 6:48 AM    Techniques: Multiplanar multisequence imaging of the right knee was  obtained without administration of intra-articular or intravenous  contrast using routing protocol.    History: Knee pain, chronic, degenerative disease on xray (Age >= 5y);  Chronic pain of right knee; Chronic pain of right " knee; Primary  osteoarthritis of right knee; Acute medial meniscus tear of right  knee, initial encounter    Comparison: Right knee 1/5/2022    Findings:    MENISCI:  Medial meniscus: Predominantly horizontal tear of the medial meniscal  body and posterior horn with approximately 5 mm displaced meniscal  flap in the medial tibial gutter with peripheral extrusion of meniscal  body and prominent adjacent osteophyte.  Lateral meniscus: Signal attenuation anterior root ligament of lateral  meniscus, possibly related to adjacent ganglion/synovial cysts.  Otherwise, lateral meniscus is intact.    LIGAMENTS  Cruciate ligaments: Intact.  Medial supporting structures: Thickening of proximal tibial collateral  ligament and peripheral bowing associated small tibial collateral  ligament bursal fluid, likely reactive to underlying meniscal  pathology. Meniscofemoral and meniscotibial ligaments are highly  sprained.  Lateral supporting structures: Low grade intrasubstance tear conjoined  tendon distally. Fibular collateral ligament, iliotibial band,  popliteus tendon, biceps femoris tendons are intact.    EXTENSOR MECHANISM  Intact. Patellar enthesopathy. Multilobulated cystic appearing mass in  the anterior interval, measuring approximately 2.0 x 1.8 cm, likely  ganglion/synovial cyst, intimate with the distal anterior cruciate  ligament/anterior root of lateral meniscus.    FLUID  Small joint effusion. Moderate sized Baker's cyst.  Multilobulated/septated cystic appearing mass posterior knee, possibly  representing ganglion/synovial cyst (image 28 series 4 for instance).    OSSEOUS and ARTICULAR STRUCTURES  Bones: No fracture, contusion, or osseous lesion is seen. Prominent  osteophytosis especially in the medial compartment. Cystlike change at  the lateral tibial spine, likely intraosseous ganglion cysts.    Patellofemoral compartment: Areas of moderate to high-grade chondral  loss in the patellar articular cartilage. Focal  high-grade to  full-thickness chondral fissure in the lateral trochlea caudally.    Medial compartment: Large area of full-thickness chondral loss in the  medial compartment with mild areas of subchondral edema-like marrow  signal intensity, subchondral bone plate flattening and thickening.    Lateral compartment: High-grade chondral loss in the central  weightbearing surface of the lateral femoral condyle without  subchondral osseous abnormality.    ANCILLARY FINDINGS  Muscle edema of the medial head of gastrocnemius, likely related to  muscle strain and/or delayed onset muscle soreness. Partially  visualized distal portion of the vastus lateralis demonstrates severe  fatty infiltration.      Impression    Impression:  1. Predominantly horizontal tear of the medial meniscal body and  posterior horn with approximately 5 mm displaced meniscal flap in the  medial tibial gutter.  2. Signal attenuation anterior root ligament of lateral meniscus,  possibly related to adjacent ganglion/synovial cysts with degeneration  of the root ligament. Otherwise, no lateral meniscal tear.   3. Low grade intrasubstance tear conjoined tendon distally.   4. Tricompartmental chondromalacia, greatest in the medial compartment  with grade 4 change. Focally grade III chondromalacia in the  patellofemoral and lateral compartments.  5. Moderate sized popliteal cyst.    HALIE NELIA         SYSTEM ID:  [id number]         Kajal Hernandes MD, [facility] [place]

## 2022-01-12 NOTE — LETTER
1/12/2022         RE: Pam Gaviria  [address]  [place] [address]        Dear Colleague,    Thank you for referring your patient, Pam Gaviria, to the [facility] [place]. Please see a copy of my visit note below.    ASSESSMENT & PLAN    1. Complex tear of medial meniscus of right knee as current injury, initial encounter    2. Chondromalacia of right knee      Your right knee MRI was reviewed today, showing a complex medial meniscus tear, moderate chondromalacia (aka cartilage wear and tear) and low grade tear of the distal conjoined tendon.     Upon discussion of management options, plan to proceed with the following:  - Referral placed for physical therapy. They can fit you with a medial  brace as well, to be used as needed for prolonged walking or exacerbated symptoms.   - Activity as tolerated based on pain.     Over-the-counter options to try:   - Turmeric with black pepper 1000 mg twice daily. This is an herbal anti-inflammatory supplement that has been found to be equally effective as Ibuprofen for treatment of knee arthritis pain and inflammation. We recommend the brand Pure Encapsulations.   - Glucosamine/chondroitin or fish oil can also be helpful for management of arthritis pain.  - Voltaren (diclofenac) gel is a topical NSAID medication (like ibuprofen) available over-the-counter that can be very effective for arthritis pain and inflammation. This may be applied to the painful joint 4 times per day.  - Tylenol, 2 tablets (1,000 mg) three times per day, not to exceed 3,000 mg per day.    Please schedule a follow up appointment to see me in 6-8 weeks, or sooner as needed for persistence or worsening of pain. You may call our direct clinic number ([phone]) at any time with questions or concerns.    Kajal Hernandes MD, [facility]    -----    SUBJECTIVE:  Pam Gaviria is a 51 year old  "female who is seen in follow-up for right knee pain. They were last seen 1/5/2022.     Since their last visit reports a little bit of improvement. She still notes pain with transitioning from sit to stand and bending the knee. They indicate that their current pain level is 3/10. They have tried rest/activity avoidance, ice, previous imaging (MRI 1/10/22) and Voltaren gel, Glucosamine, Tumeric, Advil.      The patient is seen by themselves.    Patient's past medical, surgical, social, and family histories were reviewed today and no changes are noted.    REVIEW OF SYSTEMS:  Constitutional: NEGATIVE for fever, chills, change in weight  Skin: NEGATIVE for worrisome rashes, moles or lesions  GI/: NEGATIVE for bowel or bladder changes  Neuro: NEGATIVE for weakness, dizziness or paresthesias    OBJECTIVE:  /80   Ht 1.575 m (5' 2\")   Wt 99.8 kg (220 lb)   BMI 40.24 kg/m     General: healthy, alert and in no distress  HEENT: no scleral icterus or conjunctival erythema  Skin: no suspicious lesions or rash. No jaundice.  CV: regular rhythm by palpation, no pedal edema  Resp: normal respiratory effort without conversational dyspnea   Psych: normal mood and affect  Gait: antalgic gait improved from previous with appropriate coordination and balance  Neuro: normal light touch sensory exam of the extremities.    MSK:  RIGHT KNEE  Inspection:    normal alignment  Palpation:    Tender about the medial and lateral joint lines. Remainder of bony and ligamentous landmarks are nontender.    Trace effusion is present    Patellofemoral crepitus is Present  Range of Motion:     00 extension to 1000 flexion  Strength:    Quadriceps 4+/5 and hamstrings 4+/5    Extensor mechanism intact    Independent visualization of the below image:  Results for orders placed or performed during the hospital encounter of 01/08/22   MR Knee Right w/o Contrast    Narrative    MR right knee without contrast 1/10/2022 6:48 AM    Techniques: " Multiplanar multisequence imaging of the right knee was  obtained without administration of intra-articular or intravenous  contrast using routing protocol.    History: Knee pain, chronic, degenerative disease on xray (Age >= 5y);  Chronic pain of right knee; Chronic pain of right knee; Primary  osteoarthritis of right knee; Acute medial meniscus tear of right  knee, initial encounter    Comparison: Right knee 1/5/2022    Findings:    MENISCI:  Medial meniscus: Predominantly horizontal tear of the medial meniscal  body and posterior horn with approximately 5 mm displaced meniscal  flap in the medial tibial gutter with peripheral extrusion of meniscal  body and prominent adjacent osteophyte.  Lateral meniscus: Signal attenuation anterior root ligament of lateral  meniscus, possibly related to adjacent ganglion/synovial cysts.  Otherwise, lateral meniscus is intact.    LIGAMENTS  Cruciate ligaments: Intact.  Medial supporting structures: Thickening of proximal tibial collateral  ligament and peripheral bowing associated small tibial collateral  ligament bursal fluid, likely reactive to underlying meniscal  pathology. Meniscofemoral and meniscotibial ligaments are highly  sprained.  Lateral supporting structures: Low grade intrasubstance tear conjoined  tendon distally. Fibular collateral ligament, iliotibial band,  popliteus tendon, biceps femoris tendons are intact.    EXTENSOR MECHANISM  Intact. Patellar enthesopathy. Multilobulated cystic appearing mass in  the anterior interval, measuring approximately 2.0 x 1.8 cm, likely  ganglion/synovial cyst, intimate with the distal anterior cruciate  ligament/anterior root of lateral meniscus.    FLUID  Small joint effusion. Moderate sized Baker's cyst.  Multilobulated/septated cystic appearing mass posterior knee, possibly  representing ganglion/synovial cyst (image 28 series 4 for instance).    OSSEOUS and ARTICULAR STRUCTURES  Bones: No fracture, contusion, or osseous  lesion is seen. Prominent  osteophytosis especially in the medial compartment. Cystlike change at  the lateral tibial spine, likely intraosseous ganglion cysts.    Patellofemoral compartment: Areas of moderate to high-grade chondral  loss in the patellar articular cartilage. Focal high-grade to  full-thickness chondral fissure in the lateral trochlea caudally.    Medial compartment: Large area of full-thickness chondral loss in the  medial compartment with mild areas of subchondral edema-like marrow  signal intensity, subchondral bone plate flattening and thickening.    Lateral compartment: High-grade chondral loss in the central  weightbearing surface of the lateral femoral condyle without  subchondral osseous abnormality.    ANCILLARY FINDINGS  Muscle edema of the medial head of gastrocnemius, likely related to  muscle strain and/or delayed onset muscle soreness. Partially  visualized distal portion of the vastus lateralis demonstrates severe  fatty infiltration.      Impression    Impression:  1. Predominantly horizontal tear of the medial meniscal body and  posterior horn with approximately 5 mm displaced meniscal flap in the  medial tibial gutter.  2. Signal attenuation anterior root ligament of lateral meniscus,  possibly related to adjacent ganglion/synovial cysts with degeneration  of the root ligament. Otherwise, no lateral meniscal tear.   3. Low grade intrasubstance tear conjoined tendon distally.   4. Tricompartmental chondromalacia, greatest in the medial compartment  with grade 4 change. Focally grade III chondromalacia in the  patellofemoral and lateral compartments.  5. Moderate sized popliteal cyst.    HALIE HUANGASHI         SYSTEM ID:  [id number]         Kajal Hernandes MD, [facility]              Again, thank you for allowing me to participate in the care of your patient.        Sincerely,        Kajal Hernandes MD

## 2022-01-21 ENCOUNTER — THERAPY VISIT (OUTPATIENT)
Dept: PHYSICAL THERAPY | Facility: CLINIC | Age: 52
End: 2022-01-21
Attending: STUDENT IN AN ORGANIZED HEALTH CARE EDUCATION/TRAINING PROGRAM
Payer: COMMERCIAL

## 2022-01-21 DIAGNOSIS — S83.231A COMPLEX TEAR OF MEDIAL MENISCUS OF RIGHT KNEE AS CURRENT INJURY, INITIAL ENCOUNTER: ICD-10-CM

## 2022-01-21 PROCEDURE — 97161 PT EVAL LOW COMPLEX 20 MIN: CPT | Mod: GP | Performed by: PHYSICAL THERAPIST

## 2022-01-21 PROCEDURE — 97110 THERAPEUTIC EXERCISES: CPT | Mod: GP | Performed by: PHYSICAL THERAPIST

## 2022-01-21 ASSESSMENT — ACTIVITIES OF DAILY LIVING (ADL)
GIVING WAY, BUCKLING OR SHIFTING OF KNEE: I HAVE THE SYMPTOM BUT IT DOES NOT AFFECT MY ACTIVITY
GO DOWN STAIRS: ACTIVITY IS SOMEWHAT DIFFICULT
LIMPING: THE SYMPTOM AFFECTS MY ACTIVITY SEVERELY
KNEEL ON THE FRONT OF YOUR KNEE: I AM UNABLE TO DO THE ACTIVITY
RISE FROM A CHAIR: ACTIVITY IS MINIMALLY DIFFICULT
AS_A_RESULT_OF_YOUR_KNEE_INJURY,_HOW_WOULD_YOU_RATE_YOUR_CURRENT_LEVEL_OF_DAILY_ACTIVITY?: ABNORMAL
SWELLING: THE SYMPTOM AFFECTS MY ACTIVITY MODERATELY
STAND: ACTIVITY IS NOT DIFFICULT
SIT WITH YOUR KNEE BENT: ACTIVITY IS FAIRLY DIFFICULT
KNEE_ACTIVITY_OF_DAILY_LIVING_SCORE: 45.71
PAIN: THE SYMPTOM AFFECTS MY ACTIVITY SEVERELY
GO UP STAIRS: ACTIVITY IS FAIRLY DIFFICULT
SQUAT: I AM UNABLE TO DO THE ACTIVITY
WALK: ACTIVITY IS MINIMALLY DIFFICULT
HOW_WOULD_YOU_RATE_THE_CURRENT_FUNCTION_OF_YOUR_KNEE_DURING_YOUR_USUAL_DAILY_ACTIVITIES_ON_A_SCALE_FROM_0_TO_100_WITH_100_BEING_YOUR_LEVEL_OF_KNEE_FUNCTION_PRIOR_TO_YOUR_INJURY_AND_0_BEING_THE_INABILITY_TO_PERFORM_ANY_OF_YOUR_USUAL_DAILY_ACTIVITIES?: 60
RAW_SCORE: 32
HOW_WOULD_YOU_RATE_THE_OVERALL_FUNCTION_OF_YOUR_KNEE_DURING_YOUR_USUAL_DAILY_ACTIVITIES?: ABNORMAL
STIFFNESS: THE SYMPTOM AFFECTS MY ACTIVITY MODERATELY
KNEE_ACTIVITY_OF_DAILY_LIVING_SUM: 32
WEAKNESS: THE SYMPTOM AFFECTS MY ACTIVITY MODERATELY

## 2022-01-21 NOTE — PROGRESS NOTES
Physical Therapy Initial Evaluation  Subjective:    Patient Health History  Pam Gaviria being seen for Right knew meniscus tear.     Problem began: 2021.   Problem occurred: vacation   Pain is reported as 6/10 on pain scale.  General health as reported by patient is good.  Pertinent medical history includes: asthma.     Medical allergies: none.   Surgeries include:  Other. Other surgery history details: Gall Bladder, Breast Reduction, .     Other medications details: allergy/ asthma.    Current occupation is .   Primary job tasks include:  Computer work and prolonged sitting.                Physical Therapy Initial Evaluation   2022   Precautions/Restrictions/MD instructions: PT eval and treat    Therapist Impression:   Pt is a 50 y/o female, with 3 month history of right knee pain. Pt presents with pain, decreased ROM, poor balance and decreased strength consistent with knee OA. These impairments limit their ability to prolonged walking, stairs, and squat. Skilled PT services necessary in order to reduce impairments and improve independent function.    Subjective:   Chief Complaint: R knee pain; fell on vacation in a pool she mis-stepped and injured it.   DOI/onset: 2021 DOS: none  Location: R knee  Quality: sharp at times, dull/achy pain  Frequency: intermittent  Radiates: none   Pain scale: Rest 0/10 Activity 9/10    Sleeping: can wake in the night due to knee pain   Exacerbated by: walking prolonged periods, stairs, and squats  Relieved by: massager/gun; NSAID's   Progression: better   Previous Treatment: PT  Effect of prior treatment: minimal improvement    PMH and/or surgical history: gall bladder, , breast reduction    Imaging: Xrays, MRI (complex medial meniscal , and OA)    Occupation:   Job duties: sitting, computer   Current HEP/exercise regimen: none  Patient's goals: painfree and stable feeling   Medications: asthma   General health as  reported by patient: good   Return to MD: 6 week follow up   Red Flags: none                        Objective:  KNEE:    Standing Posture: slight weight shift to left      SL Balance:   L: 20 sec (R hip drop)  R: 10 sec more challenged (L hip drop)    Gait: lack of push off on right; unloading the right side    Functional:   - Squat: very shallow with pain /pressure medially on right knee    Swelling: none to note; - sweep on right          AROM: (* indicates patient's pain)   PROM:(over pressure)   L  R L R   Hyperextension   0 0   5 3   Extension   0 0 0 0   Flexion   115 110* 120 110*     Strength:   L R   HIP     Flex 4-/5 3+/5*   Ext 3+/5 3+/5   ABd 3+/5 3+/5   KNEE     QS's:  Poor on right      System    Physical Exam    General     ROS    Assessment/Plan:    Patient is a 51 year old female with right side knee complaints.    Patient has the following significant findings with corresponding treatment plan.                Diagnosis 1:  R knee OA  Pain -  hot/cold therapy, manual therapy, splint/taping/bracing/orthotics, self management, education and home program  Decreased ROM/flexibility - manual therapy and therapeutic exercise  Decreased joint mobility - manual therapy and therapeutic exercise  Decreased strength - therapeutic exercise and therapeutic activities  Impaired balance - neuro re-education and therapeutic activities  Decreased proprioception - neuro re-education and therapeutic activities  Inflammation - cold therapy and self management/home program  Edema - cold therapy and self management/home program  Impaired gait - gait training  Impaired muscle performance - neuro re-education  Decreased function - therapeutic activities  Instability -  Therapeutic Activity  Therapeutic Exercise    Therapy Evaluation Codes:   Cumulative Therapy Evaluation is: Low complexity.    Previous and current functional limitations:  (See Goal Flow Sheet for this information)    Short term and Long term goals: (See  Goal Flow Sheet for this information)     Communication ability:  Patient appears to be able to clearly communicate and understand verbal and written communication and follow directions correctly.  Treatment Explanation - The following has been discussed with the patient:   RX ordered/plan of care  Anticipated outcomes  Possible risks and side effects  This patient would benefit from PT intervention to resume normal activities.   Rehab potential is good.    Frequency:  1 X week, once daily  Duration:  for 6 weeks  Discharge Plan:  Achieve all LTG.  Independent in home treatment program.  Reach maximal therapeutic benefit.    Please refer to the daily flowsheet for treatment today, total treatment time and time spent performing 1:1 timed codes.

## 2022-01-28 ENCOUNTER — THERAPY VISIT (OUTPATIENT)
Dept: PHYSICAL THERAPY | Facility: CLINIC | Age: 52
End: 2022-01-28
Payer: COMMERCIAL

## 2022-01-28 DIAGNOSIS — S83.231A COMPLEX TEAR OF MEDIAL MENISCUS OF RIGHT KNEE AS CURRENT INJURY, INITIAL ENCOUNTER: Primary | ICD-10-CM

## 2022-01-28 PROCEDURE — 97110 THERAPEUTIC EXERCISES: CPT | Mod: GP | Performed by: PHYSICAL THERAPY ASSISTANT

## 2022-01-28 PROCEDURE — 97112 NEUROMUSCULAR REEDUCATION: CPT | Mod: GP | Performed by: PHYSICAL THERAPY ASSISTANT

## 2022-02-04 ENCOUNTER — THERAPY VISIT (OUTPATIENT)
Dept: PHYSICAL THERAPY | Facility: CLINIC | Age: 52
End: 2022-02-04
Payer: COMMERCIAL

## 2022-02-04 DIAGNOSIS — S83.231A COMPLEX TEAR OF MEDIAL MENISCUS OF RIGHT KNEE AS CURRENT INJURY, INITIAL ENCOUNTER: Primary | ICD-10-CM

## 2022-02-04 PROCEDURE — 97110 THERAPEUTIC EXERCISES: CPT | Mod: GP | Performed by: PHYSICAL THERAPIST

## 2022-02-04 PROCEDURE — 97112 NEUROMUSCULAR REEDUCATION: CPT | Mod: GP | Performed by: PHYSICAL THERAPIST

## 2022-02-18 ENCOUNTER — THERAPY VISIT (OUTPATIENT)
Dept: PHYSICAL THERAPY | Facility: CLINIC | Age: 52
End: 2022-02-18
Payer: COMMERCIAL

## 2022-02-18 DIAGNOSIS — S83.231A COMPLEX TEAR OF MEDIAL MENISCUS OF RIGHT KNEE AS CURRENT INJURY, INITIAL ENCOUNTER: Primary | ICD-10-CM

## 2022-02-18 PROCEDURE — 97110 THERAPEUTIC EXERCISES: CPT | Mod: GP | Performed by: PHYSICAL THERAPIST

## 2022-02-18 PROCEDURE — 97112 NEUROMUSCULAR REEDUCATION: CPT | Mod: GP | Performed by: PHYSICAL THERAPIST

## 2022-03-11 ENCOUNTER — OFFICE VISIT (OUTPATIENT)
Dept: URGENT CARE | Facility: URGENT CARE | Age: 52
End: 2022-03-11
Payer: COMMERCIAL

## 2022-03-11 VITALS
BODY MASS INDEX: 43.26 KG/M2 | WEIGHT: 236.5 LBS | DIASTOLIC BLOOD PRESSURE: 58 MMHG | RESPIRATION RATE: 16 BRPM | HEART RATE: 86 BPM | TEMPERATURE: 98.8 F | SYSTOLIC BLOOD PRESSURE: 108 MMHG | OXYGEN SATURATION: 98 %

## 2022-03-11 DIAGNOSIS — Z20.822 SUSPECTED 2019 NOVEL CORONAVIRUS INFECTION: ICD-10-CM

## 2022-03-11 DIAGNOSIS — J45.40 MODERATE PERSISTENT ASTHMA WITHOUT COMPLICATION: ICD-10-CM

## 2022-03-11 DIAGNOSIS — R07.0 THROAT PAIN: Primary | ICD-10-CM

## 2022-03-11 LAB — DEPRECATED S PYO AG THROAT QL EIA: NEGATIVE

## 2022-03-11 PROCEDURE — 87651 STREP A DNA AMP PROBE: CPT | Performed by: FAMILY MEDICINE

## 2022-03-11 PROCEDURE — U0005 INFEC AGEN DETEC AMPLI PROBE: HCPCS | Performed by: FAMILY MEDICINE

## 2022-03-11 PROCEDURE — 99213 OFFICE O/P EST LOW 20 MIN: CPT | Performed by: FAMILY MEDICINE

## 2022-03-11 PROCEDURE — U0003 INFECTIOUS AGENT DETECTION BY NUCLEIC ACID (DNA OR RNA); SEVERE ACUTE RESPIRATORY SYNDROME CORONAVIRUS 2 (SARS-COV-2) (CORONAVIRUS DISEASE [COVID-19]), AMPLIFIED PROBE TECHNIQUE, MAKING USE OF HIGH THROUGHPUT TECHNOLOGIES AS DESCRIBED BY CMS-2020-01-R: HCPCS | Performed by: FAMILY MEDICINE

## 2022-03-11 RX ORDER — LORATADINE 10 MG/1
10 TABLET ORAL DAILY
COMMUNITY

## 2022-03-11 RX ORDER — MONTELUKAST SODIUM 10 MG/1
10 TABLET ORAL AT BEDTIME
Qty: 90 TABLET | Refills: 0 | Status: SHIPPED | OUTPATIENT
Start: 2022-03-11 | End: 2022-06-09

## 2022-03-11 NOTE — PATIENT INSTRUCTIONS
Call [phone] [place] if you don't [facility].      Take Tylenol, Ibuprofen for the throat pain    follow up if not better in 2-3 weeks.

## 2022-03-11 NOTE — PROGRESS NOTES
SUBJECTIVE:   Pam Gaviria is a 51 year old female presenting with a chief complaint of a mild sore throat, a white spot at the back of the throat.  .  Onset of symptoms was today    Patient requests a refill on Montelukast. She is leaving for [facility] in 2 days and will run out of the Montelukast during this 2-week trip.      Past Medical History:   Diagnosis Date     Arthritis      Chronic rhinitis      Heel pain      Latent tuberculosis 07/13/2015    chronically postive TB test     Obese      Uncomplicated asthma     moderate persistent asthma     Current Outpatient Medications   Medication Sig Dispense Refill     azelastine (ASTELIN) 0.1 % nasal spray USE 1 TO 2 SPRAYS IN EACH NOSTRIL TWICE DAILY 120 mL 3     cetirizine (ZYRTEC) 10 MG tablet Take 10 mg by mouth daily       fluticasone (FLONASE) 50 MCG/ACT nasal spray Spray 2 sprays into both nostrils daily 16 g 11     fluticasone-salmeterol (ADVAIR) 500-50 MCG/DOSE inhaler Inhale 1 puff into the lungs every 12 hours 1 each 11     loratadine (CLARITIN) 10 MG tablet Take 10 mg by mouth daily       Misc Natural Products (GLUCOSAMINE CHOND MSM FORMULA PO)        montelukast (SINGULAIR) 10 MG tablet TAKE 1 TABLET(10 MG) BY MOUTH AT BEDTIME 90 tablet 3     Tetrahydrozoline-Zn Sulfate (EYE DROPS ALLERGY RELIEF OP)        TURMERIC PO        UNABLE TO FIND MEDICATION NAME: Hair Skin and Nails supplement       albuterol (PROAIR RESPICLICK) 108 (90 Base) MCG/ACT inhaler Inhale 1-2 puffs into the lungs every 6 hours as needed for shortness of breath / dyspnea or wheezing Profile Rx: patient will contact pharmacy when needed (Patient not taking: Reported on 3/11/2022) 1 Inhaler 11     phentermine (ADIPEX-P) 37.5 MG capsule Take 1 capsule (37.5 mg) by mouth every morning (Patient not taking: Reported on 3/11/2022) 90 capsule 0     Social History     Tobacco Use     Smoking status: Never Smoker     Smokeless tobacco: Never Used   Substance Use Topics     Alcohol use:  Yes     Comment: 1 time evry 3 months, 1 per time       ROS:  CONSTITUTIONAL:negative for fevers.   ENT/MOUTH:  Positive for sore throat.     OBJECTIVE:  /58 (BP Location: Right arm, Patient Position: Sitting, Cuff Size: Adult Large)   Pulse 86   Temp 98.8  F (37.1  C) (Tympanic)   Resp 16   Wt 107.3 kg (236 lb 8 oz)   SpO2 98%   BMI 43.26 kg/m    GENERAL APPEARANCE: healthy, alert and no distress  HENT: TM's normal bilaterally and oropharynx is mildly erythematous with a pin-point-like white exudate just superior to the right tonsil.    NECK: supple, nontender, no lymphadenopathy    LAB:    Results for orders placed or performed in visit on 03/11/22   Streptococcus A Rapid Screen w/Reflex to PCR - Clinic Collect     Status: Normal    Specimen: Throat; Swab   Result Value Ref Range    Group A Strep antigen Negative Negative         ASSESSMENT:  Throat Pain    Moderate Persistent Asthma    PLAN:  Take Tylenol, Ibuprofen for the throat pain    follow up if not better in 2-3 weeks.      Pending lab:  COVID-19 PCR Test, Strep PCR Test.     For the asthma:  Rx:  Montelukast    Price Harper MD

## 2022-03-12 LAB
GROUP A STREP BY PCR: NOT DETECTED
SARS-COV-2 RNA RESP QL NAA+PROBE: NEGATIVE

## 2022-03-20 ENCOUNTER — HEALTH MAINTENANCE LETTER (OUTPATIENT)
Age: 52
End: 2022-03-20

## 2022-07-11 ENCOUNTER — VIRTUAL VISIT (OUTPATIENT)
Dept: PEDIATRICS | Facility: CLINIC | Age: 52
End: 2022-07-11
Payer: COMMERCIAL

## 2022-07-11 DIAGNOSIS — E66.01 MORBID OBESITY (H): ICD-10-CM

## 2022-07-11 DIAGNOSIS — J45.40 MODERATE PERSISTENT ASTHMA WITHOUT COMPLICATION: ICD-10-CM

## 2022-07-11 PROCEDURE — 99207 PR NO CHARGE LOS: CPT | Performed by: NURSE PRACTITIONER

## 2022-07-11 RX ORDER — ALBUTEROL SULFATE 90 UG/1
1-2 POWDER, METERED RESPIRATORY (INHALATION) EVERY 6 HOURS PRN
Qty: 1 EACH | Refills: 11 | Status: SHIPPED | OUTPATIENT
Start: 2022-07-11 | End: 2022-07-18

## 2022-07-11 RX ORDER — PHENTERMINE HYDROCHLORIDE 37.5 MG/1
37.5 CAPSULE ORAL EVERY MORNING
Qty: 90 CAPSULE | Refills: 0 | Status: SHIPPED | OUTPATIENT
Start: 2022-07-11 | End: 2022-07-25

## 2022-07-11 RX ORDER — MONTELUKAST SODIUM 10 MG/1
1 TABLET ORAL AT BEDTIME
Qty: 90 TABLET | Refills: 3 | Status: SHIPPED | OUTPATIENT
Start: 2022-07-11 | End: 2022-07-25

## 2022-07-11 NOTE — PROGRESS NOTES
Visit not needed. Spoke with pt and refills made. Scheduled physical for the fall    ASSESSMENT/PLAN:  (J45.40) Moderate persistent asthma without complication  Comment: Well controlled. Needs refills.   Plan: albuterol (PROAIR RESPICLICK) 108 (90 Base)         MCG/ACT inhaler, montelukast (SINGULAIR) 10 MG         tablet      (E66.01) Morbid obesity (H)  Comment: Has been out of this but wants to re-start. Effective previously. Doing [employer]ystems.   Plan: phentermine (ADIPEX-P) 37.5 MG capsule  Recheck at upcoming physical. Check BP at outside pharmacy in a few weeks        Answers for HPI/ROS submitted by the patient on 7/11/2022  Do you have a cough?: No  Are you experiencing any wheezing in your chest?: No  Do you have any shortness of breath?: No  Use of rescue inhaler:: a few times a month  Taking Asthma medication as prescribed:: 0  Asthma triggers:: animal dander, pollens  Have you had any Emergency Room visits, Urgent Care visits, or Hospital Admissions since your last office visit?: No  How many servings of fruits and vegetables do you eat daily?: 2-3  On average, how many sweetened beverages do you drink each day (Examples: soda, juice, sweet tea, etc.  Do NOT count diet or artificially sweetened beverages)?: 0  How many minutes a day do you exercise enough to make your heart beat faster?: 9 or less  How many days a week do you exercise enough to make your heart beat faster?: 3 or less  How many days per week do you miss taking your medication?: 1  What makes it hard for you to take your medication every day?: remembering to take

## 2022-07-12 DIAGNOSIS — J45.40 MODERATE PERSISTENT ASTHMA WITHOUT COMPLICATION: ICD-10-CM

## 2022-07-14 ENCOUNTER — TELEPHONE (OUTPATIENT)
Dept: PEDIATRICS | Facility: CLINIC | Age: 52
End: 2022-07-14

## 2022-07-14 NOTE — TELEPHONE ENCOUNTER
Prior Authorization Retail Medication Request    Medication/Dose: levalbuterol (XOPENEX HFA) 45 MCG/ACT inhaler   ICD code (if different than what is on RX):    Previously Tried and Failed:    Rationale:  Moderate persistent asthma without complication    Insurance Name:  [employer]  Insurance ID:  [id number]      Pharmacy Information (if different than what is on RX)  Name:  [facility]  Phone:  [phone]

## 2022-07-14 NOTE — TELEPHONE ENCOUNTER
Please see pharmacy comment: Please consider alternative. Please respond with prescription changes or obtain Prior Auth. Call [phone].   Please consider alternative. Please respond with prescription changes or obtain Prior Auth. Call [phone].      PA or alternative?     Eligio ERWIN RN

## 2022-07-15 NOTE — TELEPHONE ENCOUNTER
"I DONT SEE levalbuterol (XOPENEX HFA) 45 MCG/ACT inhaler  ON HER MED LIST. IF I DO A SEARCH I SEE ONE \"PENDING\" FROM 7/12 BUT IT DOESN'T APPEAR TO HAVE BEEN SENT TO THE PHARMACY. ALSO THERE IS NO QTY SELECTED. I WOULD NEED THIS RELEASED TO THE PHARMACY WITH A QTY. THEN WE CAN SUBMIT FOR A PA.  "

## 2022-07-18 RX ORDER — LEVALBUTEROL TARTRATE 45 UG/1
1-2 AEROSOL, METERED ORAL EVERY 6 HOURS PRN
Qty: 15 G | Refills: 1 | Status: SHIPPED | OUTPATIENT
Start: 2022-07-18 | End: 2023-08-07

## 2022-07-19 ENCOUNTER — ANCILLARY PROCEDURE (OUTPATIENT)
Dept: MAMMOGRAPHY | Facility: CLINIC | Age: 52
End: 2022-07-19
Attending: NURSE PRACTITIONER
Payer: COMMERCIAL

## 2022-07-19 DIAGNOSIS — Z00.00 ROUTINE GENERAL MEDICAL EXAMINATION AT A HEALTH CARE FACILITY: ICD-10-CM

## 2022-07-19 PROCEDURE — 77067 SCR MAMMO BI INCL CAD: CPT | Mod: TC | Performed by: RADIOLOGY

## 2022-07-20 ENCOUNTER — MYC MEDICAL ADVICE (OUTPATIENT)
Dept: PEDIATRICS | Facility: CLINIC | Age: 52
End: 2022-07-20

## 2022-07-20 DIAGNOSIS — E66.01 MORBID OBESITY (H): ICD-10-CM

## 2022-07-20 DIAGNOSIS — J45.40 MODERATE PERSISTENT ASTHMA WITHOUT COMPLICATION: ICD-10-CM

## 2022-07-25 RX ORDER — MONTELUKAST SODIUM 10 MG/1
1 TABLET ORAL AT BEDTIME
Qty: 90 TABLET | Refills: 3 | Status: SHIPPED | OUTPATIENT
Start: 2022-07-25 | End: 2023-08-07

## 2022-07-25 RX ORDER — PHENTERMINE HYDROCHLORIDE 37.5 MG/1
37.5 CAPSULE ORAL EVERY MORNING
Qty: 90 CAPSULE | Refills: 0 | Status: SHIPPED | OUTPATIENT
Start: 2022-07-25 | End: 2023-04-13

## 2022-07-25 NOTE — TELEPHONE ENCOUNTER
Routing refill request to provider for review/approval because:  Drug not on the [facility] refill protocol     Eligio ERWIN RN

## 2022-09-11 ENCOUNTER — HEALTH MAINTENANCE LETTER (OUTPATIENT)
Age: 52
End: 2022-09-11

## 2022-09-22 ASSESSMENT — ENCOUNTER SYMPTOMS
PARESTHESIAS: 0
MYALGIAS: 0
FEVER: 0
ABDOMINAL PAIN: 0
HEADACHES: 0
PALPITATIONS: 0
WEAKNESS: 0
ARTHRALGIAS: 1
CONSTIPATION: 0
HEARTBURN: 0
JOINT SWELLING: 1
SORE THROAT: 0
FREQUENCY: 0
BREAST MASS: 0
NERVOUS/ANXIOUS: 0
DYSURIA: 0
SHORTNESS OF BREATH: 0
NAUSEA: 0
EYE PAIN: 0
DIZZINESS: 0
CHILLS: 0
HEMATURIA: 0
HEMATOCHEZIA: 0
DIARRHEA: 0
COUGH: 0

## 2022-09-23 ENCOUNTER — OFFICE VISIT (OUTPATIENT)
Dept: PEDIATRICS | Facility: CLINIC | Age: 52
End: 2022-09-23
Payer: COMMERCIAL

## 2022-09-23 VITALS
WEIGHT: 231.9 LBS | RESPIRATION RATE: 16 BRPM | DIASTOLIC BLOOD PRESSURE: 66 MMHG | HEIGHT: 62 IN | TEMPERATURE: 97.5 F | SYSTOLIC BLOOD PRESSURE: 108 MMHG | HEART RATE: 82 BPM | OXYGEN SATURATION: 96 % | BODY MASS INDEX: 42.68 KG/M2

## 2022-09-23 DIAGNOSIS — E66.01 MORBID OBESITY (H): ICD-10-CM

## 2022-09-23 DIAGNOSIS — M79.674 PAIN OF TOE OF RIGHT FOOT: ICD-10-CM

## 2022-09-23 DIAGNOSIS — Z00.00 ROUTINE GENERAL MEDICAL EXAMINATION AT A HEALTH CARE FACILITY: Primary | ICD-10-CM

## 2022-09-23 DIAGNOSIS — E78.5 HYPERLIPIDEMIA, UNSPECIFIED HYPERLIPIDEMIA TYPE: ICD-10-CM

## 2022-09-23 DIAGNOSIS — J45.40 MODERATE PERSISTENT ASTHMA WITHOUT COMPLICATION: ICD-10-CM

## 2022-09-23 DIAGNOSIS — M25.561 RIGHT KNEE PAIN, UNSPECIFIED CHRONICITY: ICD-10-CM

## 2022-09-23 DIAGNOSIS — J31.0 CHRONIC RHINITIS: ICD-10-CM

## 2022-09-23 LAB
ALBUMIN SERPL-MCNC: 3.8 G/DL (ref 3.4–5)
ALP SERPL-CCNC: 78 U/L (ref 40–150)
ALT SERPL W P-5'-P-CCNC: 24 U/L (ref 0–50)
ANION GAP SERPL CALCULATED.3IONS-SCNC: 3 MMOL/L (ref 3–14)
AST SERPL W P-5'-P-CCNC: 20 U/L (ref 0–45)
BILIRUB SERPL-MCNC: 0.5 MG/DL (ref 0.2–1.3)
BUN SERPL-MCNC: 16 MG/DL (ref 7–30)
CALCIUM SERPL-MCNC: 9.6 MG/DL (ref 8.5–10.1)
CHLORIDE BLD-SCNC: 108 MMOL/L (ref 94–109)
CHOLEST SERPL-MCNC: 235 MG/DL
CO2 SERPL-SCNC: 29 MMOL/L (ref 20–32)
CREAT SERPL-MCNC: 0.88 MG/DL (ref 0.52–1.04)
FASTING STATUS PATIENT QL REPORTED: YES
GFR SERPL CREATININE-BSD FRML MDRD: 79 ML/MIN/1.73M2
GLUCOSE BLD-MCNC: 94 MG/DL (ref 70–99)
HDLC SERPL-MCNC: 62 MG/DL
LDLC SERPL CALC-MCNC: 144 MG/DL
NONHDLC SERPL-MCNC: 173 MG/DL
POTASSIUM BLD-SCNC: 4.9 MMOL/L (ref 3.4–5.3)
PROT SERPL-MCNC: 7.6 G/DL (ref 6.8–8.8)
SODIUM SERPL-SCNC: 140 MMOL/L (ref 133–144)
TRIGL SERPL-MCNC: 146 MG/DL

## 2022-09-23 PROCEDURE — 80053 COMPREHEN METABOLIC PANEL: CPT | Performed by: NURSE PRACTITIONER

## 2022-09-23 PROCEDURE — 36415 COLL VENOUS BLD VENIPUNCTURE: CPT | Performed by: NURSE PRACTITIONER

## 2022-09-23 PROCEDURE — 99396 PREV VISIT EST AGE 40-64: CPT | Performed by: NURSE PRACTITIONER

## 2022-09-23 PROCEDURE — 80061 LIPID PANEL: CPT | Performed by: NURSE PRACTITIONER

## 2022-09-23 PROCEDURE — 99214 OFFICE O/P EST MOD 30 MIN: CPT | Mod: 25 | Performed by: NURSE PRACTITIONER

## 2022-09-23 RX ORDER — MUPIROCIN 20 MG/G
OINTMENT TOPICAL 3 TIMES DAILY
Qty: 15 G | Refills: 0 | Status: SHIPPED | OUTPATIENT
Start: 2022-09-23 | End: 2022-09-28

## 2022-09-23 ASSESSMENT — ENCOUNTER SYMPTOMS
SORE THROAT: 0
WEAKNESS: 0
FREQUENCY: 0
HEADACHES: 0
HEARTBURN: 0
DYSURIA: 0
NERVOUS/ANXIOUS: 0
HEMATURIA: 0
PARESTHESIAS: 0
CONSTIPATION: 0
EYE PAIN: 0
HEMATOCHEZIA: 0
ABDOMINAL PAIN: 0
DIARRHEA: 0
ARTHRALGIAS: 1
SHORTNESS OF BREATH: 0
FEVER: 0
JOINT SWELLING: 1
PALPITATIONS: 0
CHILLS: 0
BREAST MASS: 0
NAUSEA: 0
DIZZINESS: 0
MYALGIAS: 0
COUGH: 0

## 2022-09-23 ASSESSMENT — PAIN SCALES - GENERAL: PAINLEVEL: MILD PAIN (2)

## 2022-09-23 NOTE — PROGRESS NOTES
SUBJECTIVE:   CC: Pam is an 52 year old who presents for preventive health visit.     Patient has been advised of split billing requirements and indicates understanding: Yes  Healthy Habits:     Getting at least 3 servings of Calcium per day:  Yes    Bi-annual eye exam:  Yes    Dental care twice a year:  Yes    Sleep apnea or symptoms of sleep apnea:  None    Diet:  Regular (no restrictions)    Frequency of exercise:  None    Taking medications regularly:  Yes    Medication side effects:  Not applicable and None    PHQ-2 Total Score: 0    Additional concerns today:  No    Concerns today: toe infection - ingrown nail - right foot 3rd toe  Referral to ortho surgeon  FASTING today    Middle toe infection - Hx ingrown toenail for the past few weeks, got worse after she got her nails done. She has been using bacitracin for one week. Not red/hot. No drainage.      Knees - Right torn meniscus for the past 10 months, went to PT but didn't help. Wants ortho referral to consider surgery. Also both knees painful, unsure if its arthritis. Not taking any OTC meds, topical, heat or ice.    Allergies   A lot of congestion but feels like it is controlled. Using astelin nasal spray, flonase, singulair, and Claritin. She has seen allergy in the past, not interested in seeing them again.    Asthma  Feels like it is controlled. Takes Advair twice daily. Has not needed to take albuterol inhaler.     Phentermine   Eats less on this medication, it helps to curb appetite. No formal exercise. She has a hard time get a good nights sleep on the medication and also slight constipation. She won't take it twice a week so she can get a good night sleep and to help have a full BM. Eats a lot of fruit and vegetables, trying to work on eating healthier.        Today's PHQ-2 Score:   PHQ-2 ( 1999 Pfizer) 9/22/2022   Q1: Little interest or pleasure in doing things 0   Q2: Feeling down, depressed or hopeless 0   PHQ-2 Score 0   PHQ-2 Total Score  (12-17 Years)- Positive if 3 or more points; Administer PHQ-A if positive -   Q1: Little interest or pleasure in doing things Not at all   Q2: Feeling down, depressed or hopeless Not at all   PHQ-2 Score 0     Abuse: Current or Past (Physical, Sexual or Emotional) - No  Do you feel safe in your environment? Yes    Social History     Tobacco Use     Smoking status: Never Smoker     Smokeless tobacco: Never Used   Substance Use Topics     Alcohol use: Yes     Comment: 1 time evry 3 months, 1 per time     Alcohol Use 9/22/2022   Prescreen: >3 drinks/day or >7 drinks/week? No   Prescreen: >3 drinks/day or >7 drinks/week? -     Reviewed orders with patient.  Reviewed health maintenance and updated orders accordingly - Yes      Breast Cancer Screening:    Breast CA Risk Assessment (FHS-7) 9/3/2021   Do you have a family history of breast, colon, or ovarian cancer? No / Unknown           Pertinent mammograms are reviewed under the imaging tab.    History of abnormal Pap smear: NO - age 30-65 PAP every 5 years with negative HPV co-testing recommended  PAP / HPV Latest Ref Rng & Units 9/3/2021 5/12/2017   PAP   Negative for Intraepithelial Lesion or Malignancy (NILM) -   PAP (Historical) - - OTHER-NIL, See Result   HPV16 Negative Negative Negative   HPV18 Negative Negative Negative   HRHPV Negative Negative Negative     Reviewed and updated as needed this visit by clinical staff   Tobacco  Allergies    Med Hx  Surg Hx  Fam Hx  Soc Hx          Reviewed and updated as needed this visit by Provider                       Review of Systems   Constitutional: Negative for chills and fever.   HENT: Negative for congestion, ear pain, hearing loss and sore throat.    Eyes: Negative for pain and visual disturbance.   Respiratory: Negative for cough and shortness of breath.    Cardiovascular: Negative for chest pain, palpitations and peripheral edema.   Gastrointestinal: Negative for abdominal pain, constipation, diarrhea,  "heartburn, hematochezia and nausea.   Breasts:  Negative for tenderness and breast mass.   Genitourinary: Negative for dysuria, frequency, genital sores, hematuria, pelvic pain, urgency, vaginal bleeding and vaginal discharge.   Musculoskeletal: Positive for arthralgias and joint swelling. Negative for myalgias.   Skin: Negative for rash.   Neurological: Negative for dizziness, weakness, headaches and paresthesias.   Psychiatric/Behavioral: Negative for mood changes. The patient is not nervous/anxious.      CONSTITUTIONAL: NEGATIVE for fever, chills. Some intentional weight loss  INTEGUMENTARY/SKIN: NEGATIVE for worrisome rashes, moles or lesions  EYES: NEGATIVE for vision changes or irritation  ENT: NEGATIVE for ear, mouth and throat problems. Reports some congestion  RESP: NEGATIVE for significant cough or SOB  BREAST: NEGATIVE for masses, tenderness or discharge  CV: NEGATIVE for chest pain, palpitations or peripheral edema  GI: NEGATIVE for nausea, abdominal pain, heartburn, or change in bowel habits  : NEGATIVE for unusual urinary or vaginal symptoms. No vaginal bleeding.  MUSCULOSKELETAL: POSITIVE for arthralgias bilateral knees  NEURO: NEGATIVE for weakness, dizziness or paresthesias  PSYCHIATRIC: NEGATIVE for changes in mood or affect      OBJECTIVE:   /66 (BP Location: Right arm, Cuff Size: Adult Large)   Pulse 82   Temp 97.5  F (36.4  C) (Tympanic)   Resp 16   Ht 1.572 m (5' 1.9\")   Wt 105.2 kg (231 lb 14.4 oz)   LMP  (LMP Unknown)   SpO2 96%   BMI 42.55 kg/m    Physical Exam  GENERAL APPEARANCE: healthy, alert and no distress  EYES: Eyes grossly normal to inspection, PERRL and conjunctivae and sclerae normal  HENT: ear canals and TM's normal, nose and mouth without ulcers or lesions, oropharynx clear and oral mucous membranes moist  NECK: no adenopathy, no asymmetry, masses, or scars and thyroid normal to palpation  RESP: lungs clear to auscultation - no rales, rhonchi or wheezes  BREAST: " normal without masses, tenderness or nipple discharge and no palpable axillary masses or adenopathy  CV: regular rate and rhythm, normal S1 S2, no S3 or S4, no murmur, click or rub, no peripheral edema and peripheral pulses strong  ABDOMEN: soft, nontender, no hepatosplenomegaly, no masses and bowel sounds normal  MS: no musculoskeletal defects are noted and gait is age appropriate without ataxia  SKIN: no suspicious lesions or rashes. Right 3rd toe with minimal swelling along right lateral toenail bed, no erythema, warmth or drainage.   NEURO: Normal strength and tone, sensory exam grossly normal, mentation intact and speech normal  PSYCH: mentation appears normal and affect normal/bright        ASSESSMENT/PLAN:   (J45.40) Moderate persistent asthma without complication  (primary encounter diagnosis)  Comment: Stable. On max allergy therapy for allergies.  Plan: Continue with Advair inhaler BID    (E66.01) Morbid obesity (H)  Comment: 4-5lb weight loss over the past 6 months. Taking phentermine but having trouble sleeping, so is only taking 5 days a week.  Plan: Discussed lowering dose of phentermine due to side effects but patients declines, reports it is manageable. Also discussed augmenting with another weight loss medication but patient declines. May consider switching to GLP-1 agonist next year to aid with weight loss, patient will think about it and let us know. Encouraged healthy habits.    (E78.5) Hyperlipidemia, unspecified hyperlipidemia type  Comment: Recheck  Plan: Lipid panel reflex to direct LDL Fasting    (J31.0) Chronic rhinitis  Comment: Stable. Patient taking singulair, zyrtec, claritin, astelin nasal spray and flonase nasal spray with adequate symptom management  Plan: Continue current regimen    (Z00.00) Routine general medical examination at a health care facility  Comment: Hx elevated LFTs and decreased GFR  Plan:   -Has an appt to get flu and covid. Declines today    (M25.561) Right knee  "pain, unspecified chronicity  Comment: Chronic, known torn meniscus. Patient desires ortho referral to consider surgical management  Plan: Orthopedic  Referral    (M79.644) Pain of toe of right foot  Comment: Slight ingrown toenail, no overt s/s of infection. No signs of paronychia  Plan: mupirocin (BACTROBAN) 2 % external ointment        Encourage foot soaks, bactroban ointment. If worsens can consider podiatry referral for ingrown toenail      COUNSELING:  Reviewed preventive health counseling, as reflected in patient instructions    Estimated body mass index is 42.55 kg/m  as calculated from the following:    Height as of this encounter: 1.572 m (5' 1.9\").    Weight as of this encounter: 105.2 kg (231 lb 14.4 oz).    Weight management plan: Discussed healthy diet and exercise guidelines    She reports that she has never smoked. She has never used smokeless tobacco.      Counseling Resources:  ATP IV Guidelines  Pooled Cohorts Equation Calculator  Breast Cancer Risk Calculator  BRCA-Related Cancer Risk Assessment: FHS-7 Tool  FRAX Risk Assessment  ICSI Preventive Guidelines  Dietary Guidelines for Americans, 2010  USDA's MyPlate  ASA Prophylaxis  Lung CA Screening    GRICELDA KramerN, RN, FNP Student    \"I was present with the student who participated in the visit and documentation. I've verified the history and personally performed the exam and medical decision making. I agree with the assessment and plan as documented in the note. TRELL Mace-BLADIMIR, RN.      JOSEFINA Horne [facility] BRET  "

## 2022-10-07 ENCOUNTER — OFFICE VISIT (OUTPATIENT)
Dept: ORTHOPEDICS | Facility: CLINIC | Age: 52
End: 2022-10-07
Attending: NURSE PRACTITIONER
Payer: COMMERCIAL

## 2022-10-07 VITALS
DIASTOLIC BLOOD PRESSURE: 74 MMHG | HEIGHT: 62 IN | BODY MASS INDEX: 42.51 KG/M2 | SYSTOLIC BLOOD PRESSURE: 108 MMHG | WEIGHT: 231 LBS

## 2022-10-07 DIAGNOSIS — M17.11 PRIMARY OSTEOARTHRITIS OF RIGHT KNEE: Primary | ICD-10-CM

## 2022-10-07 DIAGNOSIS — M25.561 RIGHT KNEE PAIN, UNSPECIFIED CHRONICITY: ICD-10-CM

## 2022-10-07 DIAGNOSIS — M23.203 DEGENERATIVE TEAR OF MEDIAL MENISCUS OF RIGHT KNEE: ICD-10-CM

## 2022-10-07 PROCEDURE — 99204 OFFICE O/P NEW MOD 45 MIN: CPT | Performed by: ORTHOPAEDIC SURGERY

## 2022-10-07 NOTE — LETTER
10/7/2022         RE: Pam Gaviria  [address]  [place] [address]        Dear Colleague,    Thank you for referring your patient, Pam Gaviria, to the [facility] [place]. Please see a copy of my visit note below.    CHIEF COMPLAINT: right knee pain    DIAGNOSIS: Right knee osteoarthritis, degenerative medial meniscus tear    OCCUPATION/SPORT: office setting-computer work    HPI:   Pam Gaviria is a very pleasant 52 year old female who presents for evaluation of right knee pain. Symptoms started in Novemeber of 2021. There was a precipitating event that she describes as when entering the pool she misjudged her last step and jese the right knee. She reports intermittent pain in the right knee. The pain and sensitivitity located to the anterior knee, medial boarder of patella.  Intermittent pain in the medial joint line with radiation to the shin, and nerve pain in the posterior aspect of the knee most prominent when driving.  She reports swelling in the knee at end of day.  Occasional sensation of catching in thknee causing her to ambulate with a waddle. She also reports intermittent sensation of buckling. Worst pain is rated a 9 of 10, and current pain is rated at 2 of 10. Symptoms are worsened by ambulating stairs, at end of day, prolonged walking, driving, twisting and turning over in bed. Symptoms are improved with icing, topical Voltaren gel/ solanpas, activity avoidance. Patient has tried topical Voltaren gel, topical solanpas patches, massage, Physical Therapy x4 sessions, Tumeric supplement, Glucosamine, and Advil as needed  with moderate relief. Notably, the patient has history of knee osteoarthritis, and meniscus tear. No other concerns or complaints at this time.    Hgb A1C dated 9/3/21: 5.3    PAST MEDICAL HISTORY:  Past Medical History:   Diagnosis Date     Arthritis      Chronic rhinitis      Heel pain      Latent tuberculosis 07/13/2015     chronically postive TB test     Obese      Uncomplicated asthma     moderate persistent asthma       PAST SURGICAL HISTORY:  Past Surgical History:   Procedure Laterality Date      SECTION      times 2     CHOLECYSTECTOMY       MAMMOPLASTY REDUCTION BILATERAL Bilateral 2018    Procedure: MAMMOPLASTY REDUCTION BILATERAL;  MAMMOPLASTY REDUCTION BILATERAL ;  Surgeon: Maru Nguyen MD;  Location: [facility]     UPPER GI ENDOSCOPY         CURRENT MEDICATIONS:  Current Outpatient Medications   Medication Sig Dispense Refill     montelukast (SINGULAIR) 10 MG tablet Take 1 tablet (10 mg) by mouth At Bedtime 90 tablet 3     phentermine (ADIPEX-P) 37.5 MG capsule Take 1 capsule (37.5 mg) by mouth every morning 90 capsule 0     azelastine (ASTELIN) 0.1 % nasal spray Spray 1 spray into both nostrils 2 times daily 30 mL 11     cetirizine (ZYRTEC) 10 MG tablet Take 10 mg by mouth daily       fluticasone (FLONASE) 50 MCG/ACT nasal spray Spray 2 sprays into both nostrils daily 16 g 11     fluticasone-salmeterol (ADVAIR) 500-50 MCG/ACT inhaler Inhale 1 puff into the lungs every 12 hours 1 each 11     levalbuterol (XOPENEX HFA) 45 MCG/ACT inhaler Inhale 1-2 puffs into the lungs every 6 hours as needed for shortness of breath / dyspnea or wheezing 15 g 1     loratadine (CLARITIN) 10 MG tablet Take 10 mg by mouth daily       Misc Natural Products (GLUCOSAMINE CHOND MSM FORMULA PO)        Tetrahydrozoline-Zn Sulfate (EYE DROPS ALLERGY RELIEF OP)        TURMERIC PO        UNABLE TO FIND MEDICATION NAME: Hair Skin and Nails supplement         ALLERGIES:    No Known Allergies      FAMILY HISTORY: No pertinent family history, reviewed in EMR.    SOCIAL HISTORY:   Social History     Socioeconomic History     Marital status:      Spouse name: Not on file     Number of children: Not on file     Years of education: Not on file     Highest education level: Master's degree (e.g., MA, MS, Tasia, MEd, MSW, ELOISA)   Occupational  "History     Not on file   Tobacco Use     Smoking status: Never Smoker     Smokeless tobacco: Never Used   Vaping Use     Vaping Use: Never used   Substance and Sexual Activity     Alcohol use: Yes     Comment: 1 time evry 3 months, 1 per time     Drug use: No     Sexual activity: Not Currently     Partners: Male     Birth control/protection: Inserts/Ring   Other Topics Concern     Parent/sibling w/ CABG, MI or angioplasty before 65F 55M? Not Asked   Social History Narrative     Not on file     Social Determinants of Health     Financial Resource Strain: Not on file   Food Insecurity: Not on file   Transportation Needs: Not on file   Physical Activity: Not on file   Stress: Not on file   Social Connections: Not on file   Intimate Partner Violence: Not on file   Housing Stability: Not on file         REVIEW OF SYSTEMS: Positive for that noted in past medical history and history of present illness and otherwise reviewed in EMR    PHYSICAL EXAM:  Patient is 5' 1.9\" and weighs 231 lbs 0 oz. /74   Ht 1.572 m (5' 1.9\")   Wt 104.8 kg (231 lb)   LMP  (LMP Unknown)   BMI 42.39 kg/m    Constitutional: Well-developed, well-nourished, healthy appearing female.  Skin: Warm, dry   HEENT: Normal  Cardiac: Well perfused extremities, strong 2+ peripheral pulses. No edema.   Pulmonary: Breathing room air  Musculoskeletal:     Right Knee:  PROM right knee: 0-125  medial joint line tenderness  Instability: Lachman 1A, anterior drawer negative, pivot shift negative bilaterally; No varus or valgus instability in full extension or 30 degrees of flexion, posterior drawer or posterolateral instability bilaterally.  positive patellofemoral crepitus    negative Carlos which reproduces symptoms  The neurovascular exam is intact and skin is normal.     IMAGING:   I personally reviewed the prior x-rays and MRI which demonstrate tricompartmental joint osteoarthritis, there is a degenerative tear of the posterior horn of the medial " meniscus with a flap in the gutter    IMPRESSION: 52 year old-year-old female, with tricompartmental joint osteoarthritis and degenerative medial meniscus tear    PLAN:   I discussed the treatment options with Pam. I discussed at length the condition and treatment options.  I went through the results of the MRI and the x-rays with the patient today.  We discussed that there is evidence of tricompartmental joint osteoarthritis as well as a degenerative medial meniscus tear.  We discussed the degenerative medial meniscus tear is a common occurrence that happens as a result of the tricompartmental joint osteoarthritis.  I gave recommendations for conservative treatment measures including activity modification, low impact exercise, weight loss, anti-inflammatories, Voltaren gel, physical therapy, cortisone injections as needed.  We discussed that long-term data from a knee arthroscopy with partial medial meniscectomy would show that 1 year outcomes are no different than with conservative treatment alone.  We did discuss ultimately the patient may need to consider a total knee arthroplasty in the future if persistent or worsening symptoms.  Patient to follow-up with primary care sports medicine as needed in the future for cortisone injection.    At the conclusion of the office visit, Pam verbally acknowledged that I answered all of her questions satisfactorily.    Jimena Bailey MD  Orthopedic Surgery Sports Medicine and Shoulder Surgery        Again, thank you for allowing me to participate in the care of your patient.        Sincerely,        JIMENA BAILEY MD

## 2022-10-07 NOTE — PROGRESS NOTES
CHIEF COMPLAINT: right knee pain    DIAGNOSIS: Right knee osteoarthritis, degenerative medial meniscus tear    OCCUPATION/SPORT: office setting-computer work    HPI:   Pam Gaviria is a very pleasant 52 year old female who presents for evaluation of right knee pain. Symptoms started in  of . There was a precipitating event that she describes as when entering the pool she misjudged her last step and jese the right knee. She reports intermittent pain in the right knee. The pain and sensitivitity located to the anterior knee, medial boarder of patella.  Intermittent pain in the medial joint line with radiation to the shin, and nerve pain in the posterior aspect of the knee most prominent when driving.  She reports swelling in the knee at end of day.  Occasional sensation of catching in thknee causing her to ambulate with a waddle. She also reports intermittent sensation of buckling. Worst pain is rated a 9 of 10, and current pain is rated at 2 of 10. Symptoms are worsened by ambulating stairs, at end of day, prolonged walking, driving, twisting and turning over in bed. Symptoms are improved with icing, topical Voltaren gel/ solanpas, activity avoidance. Patient has tried topical Voltaren gel, topical solanpas patches, massage, Physical Therapy x4 sessions, Tumeric supplement, Glucosamine, and Advil as needed  with moderate relief. Notably, the patient has history of knee osteoarthritis, and meniscus tear. No other concerns or complaints at this time.    Hgb A1C dated 9/3/21: 5.3    PAST MEDICAL HISTORY:  Past Medical History:   Diagnosis Date     Arthritis      Chronic rhinitis      Heel pain      Latent tuberculosis 2015    chronically postive TB test     Obese      Uncomplicated asthma     moderate persistent asthma       PAST SURGICAL HISTORY:  Past Surgical History:   Procedure Laterality Date      SECTION      times 2     CHOLECYSTECTOMY       MAMMOPLASTY REDUCTION BILATERAL  Bilateral 6/6/2018    Procedure: MAMMOPLASTY REDUCTION BILATERAL;  MAMMOPLASTY REDUCTION BILATERAL ;  Surgeon: Maru Nguyen MD;  Location: [facility]     UPPER GI ENDOSCOPY         CURRENT MEDICATIONS:  Current Outpatient Medications   Medication Sig Dispense Refill     montelukast (SINGULAIR) 10 MG tablet Take 1 tablet (10 mg) by mouth At Bedtime 90 tablet 3     phentermine (ADIPEX-P) 37.5 MG capsule Take 1 capsule (37.5 mg) by mouth every morning 90 capsule 0     azelastine (ASTELIN) 0.1 % nasal spray Spray 1 spray into both nostrils 2 times daily 30 mL 11     cetirizine (ZYRTEC) 10 MG tablet Take 10 mg by mouth daily       fluticasone (FLONASE) 50 MCG/ACT nasal spray Spray 2 sprays into both nostrils daily 16 g 11     fluticasone-salmeterol (ADVAIR) 500-50 MCG/ACT inhaler Inhale 1 puff into the lungs every 12 hours 1 each 11     levalbuterol (XOPENEX HFA) 45 MCG/ACT inhaler Inhale 1-2 puffs into the lungs every 6 hours as needed for shortness of breath / dyspnea or wheezing 15 g 1     loratadine (CLARITIN) 10 MG tablet Take 10 mg by mouth daily       Misc Natural Products (GLUCOSAMINE CHOND MSM FORMULA PO)        Tetrahydrozoline-Zn Sulfate (EYE DROPS ALLERGY RELIEF OP)        TURMERIC PO        UNABLE TO FIND MEDICATION NAME: Hair Skin and Nails supplement         ALLERGIES:    No Known Allergies      FAMILY HISTORY: No pertinent family history, reviewed in EMR.    SOCIAL HISTORY:   Social History     Socioeconomic History     Marital status:      Spouse name: Not on file     Number of children: Not on file     Years of education: Not on file     Highest education level: Master's degree (e.g., MA, MS, Tasia, MEd, MSW, ELOISA)   Occupational History     Not on file   Tobacco Use     Smoking status: Never Smoker     Smokeless tobacco: Never Used   Vaping Use     Vaping Use: Never used   Substance and Sexual Activity     Alcohol use: Yes     Comment: 1 time evry 3 months, 1 per time     Drug use: No      "Sexual activity: Not Currently     Partners: Male     Birth control/protection: Inserts/Ring   Other Topics Concern     Parent/sibling w/ CABG, MI or angioplasty before 65F 55M? Not Asked   Social History Narrative     Not on file     Social Determinants of Health     Financial Resource Strain: Not on file   Food Insecurity: Not on file   Transportation Needs: Not on file   Physical Activity: Not on file   Stress: Not on file   Social Connections: Not on file   Intimate Partner Violence: Not on file   Housing Stability: Not on file         REVIEW OF SYSTEMS: Positive for that noted in past medical history and history of present illness and otherwise reviewed in EMR    PHYSICAL EXAM:  Patient is 5' 1.9\" and weighs 231 lbs 0 oz. /74   Ht 1.572 m (5' 1.9\")   Wt 104.8 kg (231 lb)   LMP  (LMP Unknown)   BMI 42.39 kg/m    Constitutional: Well-developed, well-nourished, healthy appearing female.  Skin: Warm, dry   HEENT: Normal  Cardiac: Well perfused extremities, strong 2+ peripheral pulses. No edema.   Pulmonary: Breathing room air  Musculoskeletal:     Right Knee:  PROM right knee: 0-125  medial joint line tenderness  Instability: Lachman 1A, anterior drawer negative, pivot shift negative bilaterally; No varus or valgus instability in full extension or 30 degrees of flexion, posterior drawer or posterolateral instability bilaterally.  positive patellofemoral crepitus    negative Carlos which reproduces symptoms  The neurovascular exam is intact and skin is normal.     IMAGING:   I personally reviewed the prior x-rays and MRI which demonstrate tricompartmental joint osteoarthritis, there is a degenerative tear of the posterior horn of the medial meniscus with a flap in the gutter    IMPRESSION: 52 year old-year-old female, with tricompartmental joint osteoarthritis and degenerative medial meniscus tear    PLAN:   I discussed the treatment options with Pam. I discussed at length the condition and treatment " options.  I went through the results of the MRI and the x-rays with the patient today.  We discussed that there is evidence of tricompartmental joint osteoarthritis as well as a degenerative medial meniscus tear.  We discussed the degenerative medial meniscus tear is a common occurrence that happens as a result of the tricompartmental joint osteoarthritis.  I gave recommendations for conservative treatment measures including activity modification, low impact exercise, weight loss, anti-inflammatories, Voltaren gel, physical therapy, cortisone injections as needed.  We discussed that long-term data from a knee arthroscopy with partial medial meniscectomy would show that 1 year outcomes are no different than with conservative treatment alone.  We did discuss ultimately the patient may need to consider a total knee arthroplasty in the future if persistent or worsening symptoms.  Patient to follow-up with primary care sports medicine as needed in the future for cortisone injection.    At the conclusion of the office visit, Pam verbally acknowledged that I answered all of her questions satisfactorily.    Jimena Stockton MD  Orthopedic Surgery Sports Medicine and Shoulder Surgery

## 2023-03-14 ENCOUNTER — APPOINTMENT (OUTPATIENT)
Dept: PEDIATRICS | Facility: CLINIC | Age: 53
End: 2023-03-14
Payer: COMMERCIAL

## 2023-04-13 ENCOUNTER — TELEPHONE (OUTPATIENT)
Dept: PEDIATRICS | Facility: CLINIC | Age: 53
End: 2023-04-13

## 2023-04-13 DIAGNOSIS — E66.01 MORBID OBESITY (H): ICD-10-CM

## 2023-04-13 DIAGNOSIS — Z12.11 SPECIAL SCREENING FOR MALIGNANT NEOPLASMS, COLON: Primary | ICD-10-CM

## 2023-04-13 DIAGNOSIS — E78.5 HYPERLIPIDEMIA, UNSPECIFIED HYPERLIPIDEMIA TYPE: ICD-10-CM

## 2023-04-14 RX ORDER — SEMAGLUTIDE 0.68 MG/ML
INJECTION, SOLUTION SUBCUTANEOUS
Refills: 0 | OUTPATIENT
Start: 2023-04-14

## 2023-04-14 NOTE — TELEPHONE ENCOUNTER
Prior Authorization Retail Medication Request    Medication/Dose: semaglutide (OZEMPIC) 2 MG/3ML SOPN pen  ICD code (if different than what is on RX):    Encounter Diagnoses   Name Primary?     Morbid obesity (H)      Hyperlipidemia, unspecified hyperlipidemia type        Previously Tried and Failed:  Phentermine, topiramate   Rationale: side effects ( insomnia), poor clinical outcomes     Insurance Name:  [employer]  Insurance ID:  [id number]       Pharmacy Information (if different than what is on RX) : same

## 2023-04-18 NOTE — TELEPHONE ENCOUNTER
PA Initiation    Medication: semaglutide (OZEMPIC) 2 MG/3ML SOPN pen  Insurance Company: [employer] - Phone [phone] Fax [phone]  Pharmacy Filling the Rx: [id number] - [place], [place] - [address] AT [facility]  Filling Pharmacy Phone: [phone]  Filling Pharmacy Fax: [phone]  Start Date: 4/18/2023

## 2023-04-19 NOTE — TELEPHONE ENCOUNTER
PRIOR AUTHORIZATION DENIED    Medication: semaglutide (OZEMPIC) 2 MG/3ML SOPN pen    Denial Date: 4/19/2023    Denial Rationale: Medication is only covered for diagnosis of type 2 diabetes.          Appeal Information: If provider would like to appeal this decision please attach a detailed letter of medical necessity to the patient's chart and route the encounter back to the PA Team.

## 2023-05-10 NOTE — TELEPHONE ENCOUNTER
Recommend pt schedule with MTM to see if other meds can be covered. Prior to that visit, he should call his insurance to see if any drugs in the GLP-1 class are covered for NON diabetes purposes (for weight management)

## 2023-05-10 NOTE — TELEPHONE ENCOUNTER
The pt called back and she is aware of your message but she would like to go back on the Phentermine. Pt's preferred pharmacy has been selected in this encounter. The pt would also like a colon cancer screening test ordered for her so that she can pick it up in the lab. Thank you.   Pavithra Sanchez on 5/10/2023 at 4:16 PM

## 2023-05-11 RX ORDER — PHENTERMINE HYDROCHLORIDE 15 MG/1
15 CAPSULE ORAL EVERY MORNING
Qty: 30 CAPSULE | Refills: 0 | Status: SHIPPED | OUTPATIENT
Start: 2023-05-11 | End: 2023-06-12

## 2023-05-11 NOTE — TELEPHONE ENCOUNTER
FIT test ordered    15mg phentermine ordered. We can go higher if needed int he future. Need home or NO BP in 2 weeks after starting. Needs phone follow-up on weight in 2 months.

## 2023-05-17 NOTE — TELEPHONE ENCOUNTER
The pt is aware and scheduled for her upcoming appointments.   Pavithra Sanchez on 5/17/2023 at 9:30 AM

## 2023-05-23 NOTE — TELEPHONE ENCOUNTER
"Requested Prescriptions   Pending Prescriptions Disp Refills     azelastine (ASTELIN) 0.1 % nasal spray [Pharmacy Med Name: AZELASTINE 0.1%(137MCG) NASAL-200SP]    Last Written Prescription Date:  5/7/2018  Last Fill Quantity: 1 bottle ,  # refills: 2   Last office visit: 5/14/2018 with prescribing provider:  Jackelin Parker     Future Office Visit:   Next 5 appointments (look out 90 days)     Nov 21, 2018 12:00 PM CST   [facility] JO[facility] C[facility]    [address] [facility] [address] [address]   [phone]                  30 mL 0     Sig: USE 1 TO 2 SPRAYS IN EACH NOSTRIL TWICE DAILY    Antihistamines Protocol Passed    10/31/2018 11:09 AM       Passed - Patient is 3-64 years of age    Apply weight-based dosing for peds patients age 3 - 12 years of age.    Forward request to provider for patients under the age of 3 or over the age of 64.         Passed - Recent (12 mo) or future (30 days) visit within the authorizing provider's specialty    Patient had office visit in the last 12 months or has a visit in the next 30 days with authorizing provider or within the authorizing provider's specialty.  See \"Patient Info\" tab in inbasket, or \"Choose Columns\" in Meds & Orders section of the refill encounter.                " 1

## 2023-06-02 ENCOUNTER — ALLIED HEALTH/NURSE VISIT (OUTPATIENT)
Dept: PEDIATRICS | Facility: CLINIC | Age: 53
End: 2023-06-02
Payer: COMMERCIAL

## 2023-06-02 VITALS — DIASTOLIC BLOOD PRESSURE: 77 MMHG | SYSTOLIC BLOOD PRESSURE: 113 MMHG | OXYGEN SATURATION: 96 % | HEART RATE: 86 BPM

## 2023-06-02 DIAGNOSIS — Z12.11 SPECIAL SCREENING FOR MALIGNANT NEOPLASMS, COLON: ICD-10-CM

## 2023-06-02 PROCEDURE — 99207 PR NO CHARGE NURSE ONLY: CPT

## 2023-06-02 NOTE — PROGRESS NOTES
Pam Gaviria is a 52 year old patient who comes in today for a Blood Pressure check.  Initial BP:  /77 (BP Location: Right arm, Patient Position: Sitting, Cuff Size: Adult Large)   Pulse 86   LMP  (LMP Unknown)   SpO2 96%      86  Disposition: results routed to provider    Patient is picking up FIT test kit prior to leaving clinic today.     Holly Collins MA 3:13 PM 6/2/2023

## 2023-06-06 PROCEDURE — 82274 ASSAY TEST FOR BLOOD FECAL: CPT

## 2023-06-08 LAB — HEMOCCULT STL QL IA: NEGATIVE

## 2023-06-10 DIAGNOSIS — E66.01 MORBID OBESITY (H): ICD-10-CM

## 2023-06-12 RX ORDER — PHENTERMINE HYDROCHLORIDE 15 MG/1
CAPSULE ORAL
Qty: 30 CAPSULE | Refills: 0 | Status: SHIPPED | OUTPATIENT
Start: 2023-06-12 | End: 2023-08-07

## 2023-07-17 DIAGNOSIS — J45.40 MODERATE PERSISTENT ASTHMA WITHOUT COMPLICATION: ICD-10-CM

## 2023-07-20 RX ORDER — AZELASTINE HYDROCHLORIDE 137 UG/1
SPRAY, METERED NASAL
Qty: 30 ML | Refills: 0 | Status: SHIPPED | OUTPATIENT
Start: 2023-07-20 | End: 2023-08-29

## 2023-08-06 ASSESSMENT — ASTHMA QUESTIONNAIRES: ACT_TOTALSCORE: 22

## 2023-08-07 ENCOUNTER — HOSPITAL ENCOUNTER (OUTPATIENT)
Dept: ULTRASOUND IMAGING | Facility: CLINIC | Age: 53
Discharge: HOME | End: 2023-08-07
Attending: NURSE PRACTITIONER | Admitting: NURSE PRACTITIONER
Payer: COMMERCIAL

## 2023-08-07 ENCOUNTER — VIRTUAL VISIT (OUTPATIENT)
Dept: PEDIATRICS | Facility: CLINIC | Age: 53
End: 2023-08-07
Payer: COMMERCIAL

## 2023-08-07 DIAGNOSIS — M79.605 BILATERAL LEG PAIN: ICD-10-CM

## 2023-08-07 DIAGNOSIS — M79.604 BILATERAL LEG PAIN: ICD-10-CM

## 2023-08-07 DIAGNOSIS — M79.89 LEFT LEG SWELLING: ICD-10-CM

## 2023-08-07 DIAGNOSIS — E66.01 MORBID OBESITY (H): ICD-10-CM

## 2023-08-07 DIAGNOSIS — M79.89 LEFT LEG SWELLING: Primary | ICD-10-CM

## 2023-08-07 DIAGNOSIS — J45.40 MODERATE PERSISTENT ASTHMA WITHOUT COMPLICATION: ICD-10-CM

## 2023-08-07 PROCEDURE — 99215 OFFICE O/P EST HI 40 MIN: CPT | Mod: 93 | Performed by: NURSE PRACTITIONER

## 2023-08-07 PROCEDURE — 93970 EXTREMITY STUDY: CPT

## 2023-08-07 RX ORDER — TOPIRAMATE 25 MG/1
25 TABLET, FILM COATED ORAL 2 TIMES DAILY
Qty: 180 TABLET | Refills: 1 | Status: SHIPPED | OUTPATIENT
Start: 2023-08-07 | End: 2024-07-29

## 2023-08-07 RX ORDER — LEVALBUTEROL TARTRATE 45 UG/1
1-2 AEROSOL, METERED ORAL EVERY 6 HOURS PRN
Qty: 15 G | Refills: 1 | Status: SHIPPED | OUTPATIENT
Start: 2023-08-07 | End: 2024-07-29

## 2023-08-07 RX ORDER — PHENTERMINE HYDROCHLORIDE 15 MG/1
CAPSULE ORAL
Qty: 30 CAPSULE | Refills: 0 | Status: SHIPPED | OUTPATIENT
Start: 2023-08-07 | End: 2023-08-10

## 2023-08-07 RX ORDER — FLUTICASONE PROPIONATE AND SALMETEROL 500; 50 UG/1; UG/1
1 POWDER RESPIRATORY (INHALATION) EVERY 12 HOURS
Qty: 1 EACH | Refills: 11 | Status: SHIPPED | OUTPATIENT
Start: 2023-08-07 | End: 2024-07-29

## 2023-08-07 RX ORDER — MONTELUKAST SODIUM 10 MG/1
1 TABLET ORAL AT BEDTIME
Qty: 90 TABLET | Refills: 3 | Status: SHIPPED | OUTPATIENT
Start: 2023-08-07 | End: 2024-07-29

## 2023-08-07 NOTE — PROGRESS NOTES
Pam is a 52 year old who is being evaluated via a billable telephone visit.        Distant Location (provider location):  Off-site    Assessment & Plan     (M79.89) Left leg swelling  (primary encounter diagnosis)  Comment: Pt has chronic leg pain (likely knee related) and chronic trace pitting edema. More recently, developed significant pitting edema while in [facility] (after a flight). This was not associated with shortness of breath, chest pain, or palpitations although her baseline HR was 10 points higher than usual. She stopped her phentermine as she didn't know if it could be worsening the edema.  Edema didn't improve after stopping phentermine, but did improve a few weeks later when getting back [place] the [place]. I suspect this was due to heat, sitting, salt intake. Unlikely to be related to phentermine unless BP significantly elevated. No sx to suggest heart failure or magnolia fluid overload.   Plan: US Lower Extremity Venous Duplex Left, US Lower        Extremity Venous Duplex Right  -US to rule out DVT given swelling in the setting of flying  -See extender in person. If heart/lung exam and vitals are normal, re-start phentermine. Can monitor BP at home.   -If swelling returns/worsens, order CMP (to look at liver, kidney, albumin levels) and consider sleep study to rule out causes of the swelling.     (M79.604,  M79.605) Bilateral leg pain  Comment: Discussed at length. Pain in both knees. Has sharp and dull pain, but feels this is drastically affecting her life. Saw ortho who didn't propose many interventions per pt  Plan:   -Weight loss  -Second opinion at [facility]    (E66.01) Morbid obesity (H)  Comment: Gaining weight on phentermine. GLP-1 not covered. Previously lost weight on topamax. No known contraindications. She doesn't know why it was stopped.   Plan: phentermine (ADIPEX-P) 15 MG capsule,   -See above for plan to re-start phentermine. Consider increasing in the future if BP well controlled and  "tolerating (previously had insomnia on higher doses)  -Start topamax today. Reviewed r/b/se  -Asked her to see MTM in a month to check in on phentermine and topamax. Increase as able. In my absence, MARISOL Barrera can sign phentermine prescriptions. If MTM could also review options for possible GLP-1 coverage that would be helpful. Please also encourage her to stick to a diet tracking system (weight [employer])    (J45.40) Moderate persistent asthma without complication  Comment: Stable  Plan: fluticasone-salmeterol (ADVAIR) 500-50 MCG/ACT         inhaler, levalbuterol (XOPENEX HFA) 45 MCG/ACT         inhaler, montelukast (SINGULAIR) 10 MG tablet  :[id number]}     BMI:   Estimated body mass index is 42.39 kg/m  as calculated from the following:    Height as of 10/7/22: 1.572 m (5' 1.9\").    Weight as of 10/7/22: 104.8 kg (231 lb).   Weight management plan: Discussed healthy diet and exercise guidelines    See Patient Instructions    CAROLINE PHAN, APRN CNP  [facility] [place]   Was in [facility] until last week. Left June 22nd.  Stopped phentermine 3 weeks ago because she got swelling in her legs and didn't know the cause. Completely gone now. Swelling didn't get better after stopping, but got better when she got home    Clifford HR was a bit higher as well  Pam is a 52 year old, presenting for the following health issues:    Stiff at first then gets better with a few steps. Hurts to [place] walk a block    Did PT: Didn't help much    No chief complaint on file.    HPI     Review of Systems   Constitutional, HEENT, cardiovascular, pulmonary, gi and gu systems are negative, except as otherwise noted.      Objective           Vitals:  No vitals were obtained today due to virtual visit.    Physical Exam   N/A        Phone call duration: 30 minutes, plus an additional 10-15 minutes spent in chart review/documentation      "

## 2023-08-10 ENCOUNTER — MYC REFILL (OUTPATIENT)
Dept: PEDIATRICS | Facility: CLINIC | Age: 53
End: 2023-08-10
Payer: COMMERCIAL

## 2023-08-10 DIAGNOSIS — E66.01 MORBID OBESITY (H): ICD-10-CM

## 2023-08-11 ENCOUNTER — ANCILLARY PROCEDURE (OUTPATIENT)
Dept: MAMMOGRAPHY | Facility: CLINIC | Age: 53
End: 2023-08-11
Attending: NURSE PRACTITIONER
Payer: COMMERCIAL

## 2023-08-11 DIAGNOSIS — Z12.31 VISIT FOR SCREENING MAMMOGRAM: ICD-10-CM

## 2023-08-11 PROCEDURE — 77067 SCR MAMMO BI INCL CAD: CPT | Mod: TC | Performed by: RADIOLOGY

## 2023-08-11 RX ORDER — PHENTERMINE HYDROCHLORIDE 15 MG/1
CAPSULE ORAL
Qty: 30 CAPSULE | Refills: 0 | Status: SHIPPED | OUTPATIENT
Start: 2023-08-11 | End: 2023-09-29

## 2023-08-14 ENCOUNTER — TRANSFERRED RECORDS (OUTPATIENT)
Dept: HEALTH INFORMATION MANAGEMENT | Facility: CLINIC | Age: 53
End: 2023-08-14
Payer: COMMERCIAL

## 2023-08-26 DIAGNOSIS — J45.40 MODERATE PERSISTENT ASTHMA WITHOUT COMPLICATION: ICD-10-CM

## 2023-08-29 RX ORDER — AZELASTINE HYDROCHLORIDE 137 UG/1
SPRAY, METERED NASAL
Qty: 30 ML | Refills: 5 | Status: SHIPPED | OUTPATIENT
Start: 2023-08-29 | End: 2024-06-25

## 2023-08-29 NOTE — TELEPHONE ENCOUNTER
Prescription approved per [facility] Refill Protocol.  Lucero Lyles RN, BSN  [facility]

## 2023-09-29 DIAGNOSIS — E66.01 MORBID OBESITY (H): ICD-10-CM

## 2023-09-29 RX ORDER — PHENTERMINE HYDROCHLORIDE 15 MG/1
CAPSULE ORAL
Qty: 30 CAPSULE | Refills: 0 | Status: SHIPPED | OUTPATIENT
Start: 2023-09-29 | End: 2024-07-29

## 2023-09-29 NOTE — TELEPHONE ENCOUNTER
Please was to follow-up with [facility] pharmacy this month and doesn't have it scheduled.  Please reach out to schedule.

## 2023-10-04 NOTE — TELEPHONE ENCOUNTER
Called and spoke with patient.  Patient declines to schedule.  Patient feels phentermine is not working and does not want to pay for an appointment for a medication that she does not believe is working.  Michelle Fitzgerald, CMA

## 2023-12-10 ENCOUNTER — HEALTH MAINTENANCE LETTER (OUTPATIENT)
Age: 53
End: 2023-12-10

## 2024-01-25 NOTE — LETTER
November 4, 2021      Pam Gaviria  [address] [address]        Dear Pam,       We care about your health and have reviewed your health plan including your medical conditions, medications, and lab results.  Based on this review, it is recommended that you follow up regarding the following health topic(s):  -Breast Cancer Screening  -Colon Cancer Screening    We recommend you take the following action(s):  -schedule a MAMMOGRAM which is due. Please disregard this reminder if you have had this exam elsewhere within the last 1-2 years please let us know so we can update your records.  -schedule a COLONOSCOPY to look for colon cancer (due every 10 years or 5 years in higher risk situations.)  Colonoscopies can prevent 90-95% of colon cancer deaths.  Problem lesions can be removed before they ever become cancer.  If you do not wish to do a colonoscopy or cannot afford to do one at this time, there is another option called a Fecal Immunochemical Occult Blood Test (FIT) a take home stool sample kit.  It does not replace the colonoscopy for colorectal cancer screening, but it can detect hidden bleeding in the lower colon.  It does need to be repeated every year and if a positive result is obtained, you would be referred for a colonoscopy.  If you have completed either one of these tests at another facility, please have the records sent to our clinic for our records.     If you need to  a FIT (stool) test please stop by our lab to request the kit.    Please call us at the [facility] - [phone] (or use [employer]) to address the above recommendations.     Healthy Regards,  Your Health Care Team  [facility]     [facility]  Department of Family Medicine  Family Medicine Residency Program      Patient:  Js Martinez 77 y.o. male  Date of Service: 1/25/24      Chief complaint:   Chief Complaint   Patient presents with    Hyperlipidemia    Hypertension    Knee Pain     Right knee pain - stiffness in the AM   Still very active -        Assessment and Plan   1. Essential hypertension  Blood pressure well-controlled continue current medications, I will send refills for losartan 50 with the patient request he is on auto refill so we did not send them at this time    2. At high risk for falls  Patient no reported falls    3. Prediabetes  Patient has been treated with Farxiga from his prior PCP for prediabetes technically his A1c is manage prediabetes however if this meds stopped it is highly likely would end up in diabetic range.  We discussed A1c goals and given his high functioning status despite his age I think it is reasonable to keep his A1c under 7 at this time.    4. Mixed hyperlipidemia  Continue rosuvastatin    5. Acquired hypothyroidism  Will continue brand-name Synthroid current dose is acceptable    6. Polycythemia  Continue to follow with hematology    7. Gastroesophageal reflux disease with esophagitis without hemorrhage  Continue with PPI treatment    8. Chronic pain of right knee  Likely multifactorial with DJD as well as Pez anserine bursitis.  Stretches were given presents for infectious situs and I told him that I cannot perform an injection into this bursa if it continues to be bothersome.  Knee DJD is likely going to be chronic I suggested topical treatment such as topical distractors, capsaicin, Voltaren.    9. Attention deficit hyperactivity disorder (ADHD), predominantly inattentive type  Will refill Strattera when the patient calls to ask at this time his symptoms are stable.      Return to Office: Return in about 6 months (around 7/25/2024) for High Blood Pressure,

## 2024-05-13 ENCOUNTER — OFFICE VISIT (OUTPATIENT)
Dept: INTERNAL MEDICINE | Facility: CLINIC | Age: 54
End: 2024-05-13
Payer: COMMERCIAL

## 2024-05-13 ENCOUNTER — ANCILLARY PROCEDURE (OUTPATIENT)
Dept: GENERAL RADIOLOGY | Facility: CLINIC | Age: 54
End: 2024-05-13
Attending: INTERNAL MEDICINE
Payer: COMMERCIAL

## 2024-05-13 VITALS
DIASTOLIC BLOOD PRESSURE: 75 MMHG | RESPIRATION RATE: 18 BRPM | HEIGHT: 62 IN | BODY MASS INDEX: 47.35 KG/M2 | OXYGEN SATURATION: 95 % | WEIGHT: 257.3 LBS | TEMPERATURE: 97.7 F | SYSTOLIC BLOOD PRESSURE: 111 MMHG | HEART RATE: 87 BPM

## 2024-05-13 DIAGNOSIS — M25.572 PAIN IN JOINT INVOLVING ANKLE AND FOOT, LEFT: Primary | ICD-10-CM

## 2024-05-13 DIAGNOSIS — M25.572 PAIN IN JOINT INVOLVING ANKLE AND FOOT, LEFT: ICD-10-CM

## 2024-05-13 DIAGNOSIS — E78.5 HYPERLIPIDEMIA, UNSPECIFIED HYPERLIPIDEMIA TYPE: ICD-10-CM

## 2024-05-13 DIAGNOSIS — Z12.11 SCREEN FOR COLON CANCER: ICD-10-CM

## 2024-05-13 PROCEDURE — 73610 X-RAY EXAM OF ANKLE: CPT | Mod: TC | Performed by: RADIOLOGY

## 2024-05-13 PROCEDURE — 99213 OFFICE O/P EST LOW 20 MIN: CPT | Performed by: INTERNAL MEDICINE

## 2024-05-13 ASSESSMENT — ASTHMA QUESTIONNAIRES
ACT_TOTALSCORE: 24
ACT_TOTALSCORE: 24
QUESTION_5 LAST FOUR WEEKS HOW WOULD YOU RATE YOUR ASTHMA CONTROL: WELL CONTROLLED
QUESTION_3 LAST FOUR WEEKS HOW OFTEN DID YOUR ASTHMA SYMPTOMS (WHEEZING, COUGHING, SHORTNESS OF BREATH, CHEST TIGHTNESS OR PAIN) WAKE YOU UP AT NIGHT OR EARLIER THAN USUAL IN THE MORNING: NOT AT ALL
QUESTION_4 LAST FOUR WEEKS HOW OFTEN HAVE YOU USED YOUR RESCUE INHALER OR NEBULIZER MEDICATION (SUCH AS ALBUTEROL): NOT AT ALL
QUESTION_2 LAST FOUR WEEKS HOW OFTEN HAVE YOU HAD SHORTNESS OF BREATH: NOT AT ALL
QUESTION_1 LAST FOUR WEEKS HOW MUCH OF THE TIME DID YOUR ASTHMA KEEP YOU FROM GETTING AS MUCH DONE AT WORK, SCHOOL OR AT HOME: NONE OF THE TIME

## 2024-05-13 ASSESSMENT — PAIN SCALES - GENERAL: PAINLEVEL: MODERATE PAIN (5)

## 2024-05-13 NOTE — PROGRESS NOTES
"  Assessment & Plan       Pain in joint involving ankle and foot, left  Assess X rays - has findings of OA,   Clinically lateral ankle instability, will refer to ortho   - XR Ankle Left G/E 3 Views; Future  - Orthopedic  Referral; Future            BMI  Estimated body mass index is 47.21 kg/m  as calculated from the following:    Height as of this encounter: 1.572 m (5' 1.9\").    Weight as of this encounter: 116.7 kg (257 lb 4.8 oz).         See Patient Instructions    Tomy Orozco is a 53 year old, presenting for the following health issues:  Musculoskeletal Problem (Pt c/o left ankle swelling for a few months; ok in the a.m. but after standing or walking it is swelling and very painful)        5/13/2024     3:11 PM   Additional Questions   Roomed by Kajal HEREDIA   Accompanied by n/a     History of Present Illness       Reason for visit:  Ankle swelling and pain  Symptom onset:  3-4 weeks ago    She eats 2-3 servings of fruits and vegetables daily.She consumes 0 sweetened beverage(s) daily.She exercises with enough effort to increase her heart rate 9 or less minutes per day.  She exercises with enough effort to increase her heart rate 3 or less days per week.   She is taking medications regularly.       Presents with pain in the left ankle for the past year.   Gradually worsening symptoms.   No injury. Has swelling, no redness. Pain increases with standing and walking.   Has OA of the knees. Does not use a cane. Not on medications.           Review of Systems  Constitutional, HEENT, cardiovascular, pulmonary, gi and gu systems are negative, except as otherwise noted.      Objective    /75 (BP Location: Left arm, Cuff Size: Adult Large)   Pulse 87   Temp 97.7  F (36.5  C) (Tympanic)   Resp 18   Ht 1.572 m (5' 1.9\")   Wt 116.7 kg (257 lb 4.8 oz)   LMP  (LMP Unknown)   SpO2 95%   BMI 47.21 kg/m    Body mass index is 47.21 kg/m .  Physical Exam   GENERAL: alert and no distress, overweight "   MS: no gross musculoskeletal defects noted, no edema  Left ankle edema, no redness, pain in the lateral ankle, inframalleolar, increased joint laxity - supination   SKIN: no suspicious lesions or rashes    Allied Health/Nurse Visit on 06/02/2023   Component Date Value Ref Range Status    Occult Blood Screen FIT 06/06/2023 Negative  Negative Final           Signed Electronically by: Ray Sharma MD

## 2024-05-22 ENCOUNTER — OFFICE VISIT (OUTPATIENT)
Dept: PODIATRY | Facility: CLINIC | Age: 54
End: 2024-05-22
Attending: INTERNAL MEDICINE
Payer: COMMERCIAL

## 2024-05-22 VITALS — SYSTOLIC BLOOD PRESSURE: 118 MMHG | BODY MASS INDEX: 47.16 KG/M2 | DIASTOLIC BLOOD PRESSURE: 78 MMHG | WEIGHT: 257 LBS

## 2024-05-22 DIAGNOSIS — M76.72 PERONEAL TENDONITIS, LEFT: ICD-10-CM

## 2024-05-22 DIAGNOSIS — M25.572 PAIN IN JOINT INVOLVING ANKLE AND FOOT, LEFT: ICD-10-CM

## 2024-05-22 DIAGNOSIS — M21.42 BILATERAL PES PLANUS: ICD-10-CM

## 2024-05-22 DIAGNOSIS — M19.072 ARTHRITIS OF LEFT ANKLE: Primary | ICD-10-CM

## 2024-05-22 DIAGNOSIS — M21.41 BILATERAL PES PLANUS: ICD-10-CM

## 2024-05-22 PROCEDURE — 99203 OFFICE O/P NEW LOW 30 MIN: CPT | Performed by: PODIATRIST

## 2024-05-22 NOTE — LETTER
2024         RE: Pam Gaviria  [address]  [place] [address]        Dear Colleague,    Thank you for referring your patient, Pam Gaviria, to the [facility]. Please see a copy of my visit note below.    PATIENT HISTORY:  Dr. Sharma requested I see this patient for their foot issue.  Pam Gaviria is a 53 year old female who presents to clinic for pain and swelling to left ankle.  Notes that is been going on for about 4 and half months.  An aching shooting pain.  Comes and goes.  Worse with getting up or walking.  She did have x-rays.    Review of Systems:  Patient denies fever, chills, rash, wound, numbness, weakness, heart burn, blood in stool, chest pain with activity, calf pain when walking, shortness of breath with activity, chronic cough, easy bleeding/bruising, swelling of ankles, excessive thirst, fatigue, depression, anxiety.  Patient admits to stiffness, limping.     PAST MEDICAL HISTORY:   Past Medical History:   Diagnosis Date     Arthritis      Chronic rhinitis      Heel pain      Latent tuberculosis 2015    chronically postive TB test     Obese      Uncomplicated asthma     moderate persistent asthma        PAST SURGICAL HISTORY:   Past Surgical History:   Procedure Laterality Date      SECTION      times 2     CHOLECYSTECTOMY       MAMMOPLASTY REDUCTION BILATERAL Bilateral 2018    Procedure: MAMMOPLASTY REDUCTION BILATERAL;  MAMMOPLASTY REDUCTION BILATERAL ;  Surgeon: Maru Nguyen MD;  Location: [facility]     UPPER GI ENDOSCOPY          MEDICATIONS:   Current Outpatient Medications:      Azelastine HCl 137 MCG/SPRAY SOLN, USE 1 SPRAY IN EACH NOSTRILTWICE DAILY, Disp: 30 mL, Rfl: 5     cetirizine (ZYRTEC) 10 MG tablet, Take 10 mg by mouth daily, Disp: , Rfl:      fluticasone (FLONASE) 50 MCG/ACT nasal spray, Spray 2 sprays into both nostrils daily, Disp: 16 g, Rfl: 11     fluticasone-salmeterol (ADVAIR) 500-50  MCG/ACT inhaler, Inhale 1 puff into the lungs every 12 hours, Disp: 1 each, Rfl: 11     levalbuterol (XOPENEX HFA) 45 MCG/ACT inhaler, Inhale 1-2 puffs into the lungs every 6 hours as needed for shortness of breath or wheezing, Disp: 15 g, Rfl: 1     loratadine (CLARITIN) 10 MG tablet, Take 10 mg by mouth daily, Disp: , Rfl:      montelukast (SINGULAIR) 10 MG tablet, Take 1 tablet (10 mg) by mouth At Bedtime, Disp: 90 tablet, Rfl: 3     Tetrahydrozoline-Zn Sulfate (EYE DROPS ALLERGY RELIEF OP), , Disp: , Rfl:      Misc Natural Products (GLUCOSAMINE CHOND MSM FORMULA PO), , Disp: , Rfl:      phentermine (ADIPEX-P) 15 MG capsule, TAKE 1 CAPSULE BY MOUTH IN THE MORNING FOR 30 DAYS (Patient not taking: Reported on 5/13/2024), Disp: 30 capsule, Rfl: 0     topiramate (TOPAMAX) 25 MG tablet, Take 1 tablet (25 mg) by mouth 2 times daily (Patient not taking: Reported on 5/13/2024), Disp: 180 tablet, Rfl: 1     UNABLE TO FIND, MEDICATION NAME: Hair Skin and Nails supplement (Patient not taking: Reported on 5/13/2024), Disp: , Rfl:      ALLERGIES:  No Known Allergies     SOCIAL HISTORY:   Social History     Socioeconomic History     Marital status:      Spouse name: Not on file     Number of children: Not on file     Years of education: Not on file     Highest education level: Master's degree (e.g., MA, MS, Tasia, MEd, MSW, ELOISA)   Occupational History     Not on file   Tobacco Use     Smoking status: Never     Smokeless tobacco: Never   Vaping Use     Vaping status: Never Used   Substance and Sexual Activity     Alcohol use: Yes     Comment: 1 time evry 3 months, 1 per time     Drug use: No     Sexual activity: Not Currently     Partners: Male     Birth control/protection: Inserts/Ring   Other Topics Concern     Parent/sibling w/ CABG, MI or angioplasty before 65F 55M? Not Asked   Social History Narrative     Not on file     Social Determinants of Health     Financial Resource Strain: Low Risk  (2/17/2020)    Overall  Financial Resource Strain (CARDIA)      Difficulty of Paying Living Expenses: Not hard at all   Food Insecurity: No Food Insecurity (2/17/2020)    Hunger Vital Sign      Worried About Running Out of Food in the Last Year: Never true      Ran Out of Food in the Last Year: Never true   Transportation Needs: No Transportation Needs (2/17/2020)    PRAPARE - Transportation      Lack of Transportation (Medical): No      Lack of Transportation (Non-Medical): No   Physical Activity: Insufficiently Active (2/17/2020)    Exercise Vital Sign      Days of Exercise per Week: 1 day      Minutes of Exercise per Session: 20 min   Stress: No Stress Concern Present (2/17/2020)    [employer] - Occupational Stress Questionnaire      Feeling of Stress : Only a little   Social Connections: Moderately Integrated (2/17/2020)    Social Connection and Isolation Panel [NHANES]      Frequency of Communication with Friends and Family: Once a week      Frequency of Social Gatherings with Friends and Family: Once a week      Attends Jehov[employer]'s witness Services: More than 4 times per year      Active Member of Clubs or Organizations: Yes      Attends Club or Organization Meetings: 1 to 4 times per year      Marital Status:    Interpersonal Safety: Low Risk  (5/13/2024)    Interpersonal Safety      Do you feel physically and emotionally safe where you currently live?: Yes      Within the past 12 months, have you been hit, slapped, kicked or otherwise physically hurt by someone?: No      Within the past 12 months, have you been humiliated or emotionally abused in other ways by your partner or ex-partner?: No   Housing Stability: Not on file        FAMILY HISTORY:   Family History   Problem Relation Age of Onset     Thyroid Disease Mother      Heart Defect Mother         mitral valve prolapse        EXAM:Vitals: /78   Wt 116.6 kg (257 lb)   LMP  (LMP Unknown)   BMI 47.16 kg/m    BMI= Body mass index is 47.16  kg/m .    General appearance: Patient is alert and fully cooperative with history & exam.  No sign of distress is noted during the visit.     Psychiatric: Affect is pleasant & appropriate.  Patient appears motivated to improve health.     Respiratory: Breathing is regular & unlabored while sitting.     HEENT: Hearing is intact to spoken word.  Speech is clear.  No gross evidence of visual impairment that would impact ambulation.     Dermatologic: Skin is intact to both lower extremities without significant lesions, rash or abrasion.  No paronychia or evidence of soft tissue infection is noted.     Vascular: DP & PT pulses are intact & regular bilaterally.  No significant edema or varicosities noted.  CFT and skin temperature is normal to both lower extremities.     Neurologic: Lower extremity sensation is intact to light touch.  No evidence of weakness or contracture in the lower extremities.  No evidence of neuropathy.     Musculoskeletal: Patient is ambulatory without assistive device or brace.  Decreased arch height bilaterally.  Pain on palpation along the left peroneal tendon and with eversion and inversion of the left ankle.  Pain on the medial gutter of the left ankle joint.  Pain on palpation to the posterior tibial tendon insertion on the left foot.    Radiographs: left ankle xrays - I personally reviewed the xrays.  Moderate-sized calcaneal enthesophytes. Mild  osteoarthrosis of the ankle joint. There is no evidence of fracture or  talar dome osteochondral lesion. The ankle mortise is symmetric.      ASSESSMENT:    Pain in joint involving ankle and foot, left  Arthritis of left ankle  Peroneal tendonitis, left  Bilateral pes planus     Medical Decision Making/Plan:  Reviewed patient's chart in [facility].  Reviewed and discussed x-rays. Talked about arthritis and treatments including orthotics, injections, icing, NSAIDS, bracing, physical therapy, compounding pain cream, or surgical intervention.    Reviewed and  discussed causes of tendonitis.  We discussed treatments such as immobiliation, icing, stretching, heel lifts, orthotics, physical therapy, MRI.     Given that the pain has been going on for 5 months and she has tried different shoes and stretches and pain cream and recommend an MRI of the left ankle to assess for peroneal tendon tear.  If there is no tear and just tendinitis we could try physical therapy with iontophoresis.  If the tendon is okay and is just arthritis we could try x-ray guided cortisone injections to try to help with pain as we discussed that we cannot get rid of arthritis.  We will try an ankle brace to see if that will help with the pain by limiting the motion of the joints and the tendon.  Also recommend an insert in the shoe such as Superfeet inserts green color to help support the arch of the foot.  We will MyChart or call her with the MRI results.    All questions were answered to patient's satisfaction she will call further questions or concerns      Patient risk factor: Patient is at low risk for infection.        Linnette Garcia DPM, Podiatry/Foot and Ankle Surgery      Again, thank you for allowing me to participate in the care of your patient.        Sincerely,        Linnette Garcia DPM, Podiatry/Foot and Ankle Surgery

## 2024-05-22 NOTE — PROGRESS NOTES
PATIENT HISTORY:  Dr. Sharma requested I see this patient for their foot issue.  Pam Gaviria is a 53 year old female who presents to clinic for pain and swelling to left ankle.  Notes that is been going on for about 4 and half months.  An aching shooting pain.  Comes and goes.  Worse with getting up or walking.  She did have x-rays.    Review of Systems:  Patient denies fever, chills, rash, wound, numbness, weakness, heart burn, blood in stool, chest pain with activity, calf pain when walking, shortness of breath with activity, chronic cough, easy bleeding/bruising, swelling of ankles, excessive thirst, fatigue, depression, anxiety.  Patient admits to stiffness, limping.     PAST MEDICAL HISTORY:   Past Medical History:   Diagnosis Date    Arthritis     Chronic rhinitis     Heel pain     Latent tuberculosis 2015    chronically postive TB test    Obese     Uncomplicated asthma     moderate persistent asthma        PAST SURGICAL HISTORY:   Past Surgical History:   Procedure Laterality Date     SECTION      times 2    CHOLECYSTECTOMY      MAMMOPLASTY REDUCTION BILATERAL Bilateral 2018    Procedure: MAMMOPLASTY REDUCTION BILATERAL;  MAMMOPLASTY REDUCTION BILATERAL ;  Surgeon: Maru Nguyen MD;  [facility]: [facility]    UPPER GI ENDOSCOPY          MEDICATIONS:   Current Outpatient Medications:     Azelastine HCl 137 MCG/SPRAY SOLN, USE 1 SPRAY IN EACH NOSTRILTWICE DAILY, Disp: 30 mL, Rfl: 5    cetirizine (ZYRTEC) 10 MG tablet, Take 10 mg by mouth daily, Disp: , Rfl:     fluticasone (FLONASE) 50 MCG/ACT nasal spray, Spray 2 sprays into both nostrils daily, Disp: 16 g, Rfl: 11    fluticasone-salmeterol (ADVAIR) 500-50 MCG/ACT inhaler, Inhale 1 puff into the lungs every 12 hours, Disp: 1 each, Rfl: 11    levalbuterol (XOPENEX HFA) 45 MCG/ACT inhaler, Inhale 1-2 puffs into the lungs every 6 hours as needed for shortness of breath or wheezing, Disp: 15 g, Rfl: 1    loratadine (CLARITIN) 10 MG  tablet, Take 10 mg by mouth daily, Disp: , Rfl:     montelukast (SINGULAIR) 10 MG tablet, Take 1 tablet (10 mg) by mouth At Bedtime, Disp: 90 tablet, Rfl: 3    Tetrahydrozoline-Zn Sulfate (EYE DROPS ALLERGY RELIEF OP), , Disp: , Rfl:     Misc Natural Products (GLUCOSAMINE CHOND MSM FORMULA PO), , Disp: , Rfl:     phentermine (ADIPEX-P) 15 MG capsule, TAKE 1 CAPSULE BY MOUTH IN THE MORNING FOR 30 DAYS (Patient not taking: Reported on 5/13/2024), Disp: 30 capsule, Rfl: 0    topiramate (TOPAMAX) 25 MG tablet, Take 1 tablet (25 mg) by mouth 2 times daily (Patient not taking: Reported on 5/13/2024), Disp: 180 tablet, Rfl: 1    UNABLE TO FIND, MEDICATION NAME: Hair Skin and Nails supplement (Patient not taking: Reported on 5/13/2024), Disp: , Rfl:      ALLERGIES:  No Known Allergies     SOCIAL HISTORY:   Social History     Socioeconomic History    Marital status:      Spouse name: Not on file    Number of children: Not on file    Years of education: Not on file    Highest education level: Master's degree (e.g., MA, MS, Tasia, MEd, MSW, ELOISA)   Occupational History    Not on file   Tobacco Use    Smoking status: Never    Smokeless tobacco: Never   Vaping Use    Vaping status: Never Used   Substance and Sexual Activity    Alcohol use: Yes     Comment: 1 time evry 3 months, 1 per time    Drug use: No    Sexual activity: Not Currently     Partners: Male     Birth control/protection: Inserts/Ring   Other Topics Concern    Parent/sibling w/ CABG, MI or angioplasty before 65F 55M? Not Asked   Social History Narrative    Not on file     Social Determinants of Health     Financial Resource Strain: Low Risk  (2/17/2020)    Overall Financial Resource Strain (CARDIA)     Difficulty of Paying Living Expenses: Not hard at all   Food Insecurity: No Food Insecurity (2/17/2020)    Hunger Vital Sign     Worried About Running Out of Food in the Last Year: Never true     Ran Out of Food in the Last Year: Never true   Transportation  Needs: No Transportation Needs (2/17/2020)    PRAPARE - Transportation     Lack of Transportation (Medical): No     Lack of Transportation (Non-Medical): No   Physical Activity: Insufficiently Active (2/17/2020)    Exercise Vital Sign     Days of Exercise per Week: 1 day     Minutes of Exercise per Session: 20 min   Stress: No Stress Concern Present (2/17/2020)    [employer] - Occupational Stress Questionnaire     Feeling of Stress : Only a little   Social Connections: Moderately Integrated (2/17/2020)    Social Connection and Isolation Panel [NHANES]     Frequency of Communication with Friends and Family: Once a week     Frequency of Social Gatherings with Friends and Family: Once a week     Attends [employer] Services: More than 4 times per year     Active Member of Clubs or Organizations: Yes     Attends Club or Organization Meetings: 1 to 4 times per year     Marital Status:    Interpersonal Safety: Low Risk  (5/13/2024)    Interpersonal Safety     Do you feel physically and emotionally safe where you currently live?: Yes     Within the past 12 months, have you been hit, slapped, kicked or otherwise physically hurt by someone?: No     Within the past 12 months, have you been humiliated or emotionally abused in other ways by your partner or ex-partner?: No   Housing Stability: Not on file        FAMILY HISTORY:   Family History   Problem Relation Age of Onset    Thyroid Disease Mother     Heart Defect Mother         mitral valve prolapse        EXAM:Vitals: /78   Wt 116.6 kg (257 lb)   LMP  (LMP Unknown)   BMI 47.16 kg/m    BMI= Body mass index is 47.16 kg/m .    General appearance: Patient is alert and fully cooperative with history & exam.  No sign of distress is noted during the visit.     Psychiatric: Affect is pleasant & appropriate.  Patient appears motivated to improve health.     Respiratory: Breathing is regular & unlabored while sitting.     HEENT: Hearing is  intact to spoken word.  Speech is clear.  No gross evidence of visual impairment that would impact ambulation.     Dermatologic: Skin is intact to both lower extremities without significant lesions, rash or abrasion.  No paronychia or evidence of soft tissue infection is noted.     Vascular: DP & PT pulses are intact & regular bilaterally.  No significant edema or varicosities noted.  CFT and skin temperature is normal to both lower extremities.     Neurologic: Lower extremity sensation is intact to light touch.  No evidence of weakness or contracture in the lower extremities.  No evidence of neuropathy.     Musculoskeletal: Patient is ambulatory without assistive device or brace.  Decreased arch height bilaterally.  Pain on palpation along the left peroneal tendon and with eversion and inversion of the left ankle.  Pain on the medial gutter of the left ankle joint.  Pain on palpation to the posterior tibial tendon insertion on the left foot.    Radiographs: left ankle xrays - I personally reviewed the xrays.  Moderate-sized calcaneal enthesophytes. Mild  osteoarthrosis of the ankle joint. There is no evidence of fracture or  talar dome osteochondral lesion. The ankle mortise is symmetric.      ASSESSMENT:    Pain in joint involving ankle and foot, left  Arthritis of left ankle  Peroneal tendonitis, left  Bilateral pes planus     Medical Decision Making/Plan:  Reviewed patient's chart in [facility].  Reviewed and discussed x-rays. Talked about arthritis and treatments including orthotics, injections, icing, NSAIDS, bracing, physical therapy, compounding pain cream, or surgical intervention.    Reviewed and discussed causes of tendonitis.  We discussed treatments such as immobiliation, icing, stretching, heel lifts, orthotics, physical therapy, MRI.     Given that the pain has been going on for 5 months and she has tried different shoes and stretches and pain cream and recommend an MRI of the left ankle to assess for  peroneal tendon tear.  If there is no tear and just tendinitis we could try physical therapy with iontophoresis.  If the tendon is okay and is just arthritis we could try x-ray guided cortisone injections to try to help with pain as we discussed that we cannot get rid of arthritis.  We will try an ankle brace to see if that will help with the pain by limiting the motion of the joints and the tendon.  Also recommend an insert in the shoe such as Superfeet inserts green color to help support the arch of the foot.  We will MyChart or call her with the MRI results.    All questions were answered to patient's satisfaction she will call further questions or concerns      Patient risk factor: Patient is at low risk for infection.        Linnette Garcia DPM, Podiatry/Foot and Ankle Surgery

## 2024-05-22 NOTE — PATIENT INSTRUCTIONS
Thank you for choosing [facility] Podiatry / Foot & Ankle Surgery!    DR ECHOLS'S CLINIC:  [facility]   [address]   [place] [address]      TRIAGE LINE: [phone]  APPOINTMENTS: [phone]  RADIOLOGY: [phone]  SET UP SURGERY: [phone]  PHYSICAL THERAPY: [phone]   FAX NUMBER: [phone]  BILLING QUESTIONS: [phone]       Follow up: Will call with MRI results      TENDONITIS   Tendons are the strong fibrous portions of muscles that attach to bones and allow the muscle to move a joint when it contracts. Tendons are very strong because they have a lot of force exerted on them. Sometimes tendons can become painful because they have suffered an acute injury, in which too much force was exerted at one time, or an overuse injury, in which a normal force was exerted too frequently or over a prolonged period of time. As a result, there is damage to the tendon and its surrounding soft tissue structures and they become inflammed. Because tendons do not have a great blood supply, they do not heal rapidly and the inflammation can become chronic.   Conservative treatment for tendinitis involves rest and anti-inflammatory measures. Ice is applied 15 minutes 2-3 times daily. Anti-inflammatory medications called NSAIDs (ibuprofen, example) can be taken provided they are used with caution, as they can lead to internal bleeding and increase the risk ofstroke and heart attack. Sometimes topical nitroglycerin is prescribed to help with pain. Often your doctor will use a special shoe or removable walking cast to immobilize the tendon, allowing it to heal without further damage from use. These devices are very useful in helping tendons heal, but they may slow you down or make you feel like your hip, knee, or back are out ofalignment. This is temporary and should go away once you are out ofthe immobilization. You should not use a walking cast when showering or driving. Another  option is Platelet Rich Plasma injections. (Normally done with a Sports and Orthorapedic doctor.   If conservative measures fail, your physician may need to surgically repair the tendon by removing any chronic inflammatory tissue and sewing it back together. Sometimes it is sewn to an adjacent tendon with similar function for support and sometimes it is lengthened. . Sometimes the bones around the tendon need to be realigned or reshaped to better support the tendon or prevent further damage. Your foot and ankle surgeon will discuss the specifics of your surgery with you, should you need it.    Towel stretch: Sit on a hard surface with your injured leg stretched out in front of you. Loop a towel around your toes and the ball of your foot and pull the towel toward your body keeping your leg straight. Hold this position for 15 to 30 seconds and then relax. Repeat 3 times. Then push the towel away with the ball of your foot. Repeat 3 times.  When you don't feel much of a stretch using the towel, you can start the standing calf stretch and the following exercises.  Standing calf stretch: Stand facing a wall with your hands on the wall at about eye level. Keep your injured leg back with your heel on the floor. Keep the other leg forward with the knee bent. Turn your back foot slightly inward (as if you were pigeon-toed). Slowly lean into the wall until you feel a stretch in the back of your calf. Hold the stretch for 15 to 30 seconds. Return to the starting position. Repeat 3 times. Do this exercise several times each day.   Standing soleus stretch: Stand facing a wall with your hands on the wall at about chest height. Keep your injured leg back with your heel on the floor. Keep the other leg forward with the knee bent. Turn your back foot slightly inward (as if you were pigeon-toed). Bend your back knee slightly and gently lean into the wall until you feel a stretch in the lower calf of your injured leg. Hold the stretch  for 15 to 30 seconds. Return to the starting position. Repeat 3 times.   Achilles stretch: Stand with the ball of one foot on a stair. Reach for the step below with your heel until you feel a stretch in the arch of your foot. Hold this position for 15 to 30 seconds and then relax. Repeat 3 times.   Heel raise: Balance yourself while standing behind a chair or counter. Using the chair or counter as a support to help you, raise your body up onto your toes and hold for 5 seconds. Then slowly lower yourself down without holding onto the support. (It's OK to keep holding onto the support if you need to.) When this exercise becomes less painful, try lowering yourself down on the injured leg only. Repeat 15 times. Do 2 sets of 15. Rest 30 seconds between sets.   Step-up: Stand with the foot of your injured leg on a support 3 to 5 inches high (like a small step or block of wood). Keep your other foot flat on the floor. Shift your weight onto the injured leg on the support. Straighten your injured leg as the other leg comes off the floor. Return to the starting position by bending your injured leg and slowly lowering your uninjured leg back to the floor. Do 2 sets of 15.   Resisted ankle eversion: Sit with both legs stretched out in front of you, with your feet about a shoulder's width apart. Tie a loop in one end of elastic tubing. Put the foot of your injured leg through the loop so that the tubing goes around the arch of that foot and wraps around the outside of the other foot. Hold onto the other end of the tubing with your hand to provide tension. Turn the foot of your injured leg up and out. Make sure you keep your other foot still so that it will allow the tubing to stretch as you move the foot of your injured leg. Return to the starting position. Do 2 sets of 15.   Balance and reach exercises: Stand next to a chair with your injured leg farther from the chair. The chair will provide support if you need it. Stand on  the foot of your injured leg and bend your knee slightly. Try to raise the arch of this foot while keeping your big toe on the floor. Keep your foot in this position. With the hand that is farther away from the chair, reach forward in front of you by bending at the waist. Avoid bending your knee any more as you do this. Repeat this 10 times. To make the exercise more challenging, reach farther in front of you. Do 2 sets of 10.  the same position as above. While keeping your arch height, reach the hand that is farther away from the chair across your body toward the chair. The farther you reach, the more challenging the exercise. Do 2 sets of 10.   Resisted ankle eversion: Sit with both legs stretched out in front of you, with your feet about a shoulder's width apart. Tie a loop in one end of elastic tubing. Put the foot of your injured leg through the loop so that the tubing goes around the arch of that foot and wraps around the outside of the other foot. Hold onto the other end of the tubing with your hand to provide tension. Turn the foot of your injured leg up and out. Make sure you keep your other foot still so that it will allow the tubing to stretch as you move the foot of your injured leg. Return to the starting position. Do 2 sets of 15.      Osteoarthritis of the Foot and Ankle  What Is Osteoarthritis?  Osteoarthritis is a condition characterized by the breakdown and eventual loss of cartilage in one or more joints. Cartilage (the connective tissue found at the end of the bones in the joints) protects and cushions the bones during movement. When cartilage deteriorates or is lost, symptoms develop that can restrict one s ability to easily perform daily activities.  Osteoarthritis is also known as degenerative arthritis, reflecting its nature to develop as part of the aging process. As the most common form of arthritis, osteoarthritis affects millions of Americans. Some people refer to osteoarthritis  simply as arthritis, even though there are many different types of arthritis.  Osteoarthritis appears at various joints throughout the body, including the hands, feet, spine, hips, and knees. In the foot, the disease most frequently occurs in the big toe, although it is also often found in the midfoot and ankle.  Causes  Osteoarthritis is considered a  wear and tear  disease because the cartilage in the joint wears down with repeated stress and use over time. As the cartilage deteriorates and gets thinner, the bones lose their protective covering and eventually may rub together, causing pain and inflammation of the joint.  An injury may also lead to osteoarthritis, although it may take months or years after the injury for the condition to develop. For example, osteoarthritis in the big toe is often caused by kicking or jamming the toe, or by dropping something on the toe. Osteoarthritis in the midfoot is often caused by dropping something on it, or by a sprain or fracture. In the ankle, osteoarthritis is usually caused by a fracture and occasionally by a severe sprain.  Sometimes osteoarthritis develops as a result of abnormal foot mechanics such as flat feet or high arches. A flat foot causes less stability in the ligaments (bands of tissue that connect bones), resulting in excessive strain on the joints, which can cause arthritis. A high arch is rigid and lacks mobility, causing a jamming of joints that creates an increased risk of arthritis.  Symptoms  People with osteoarthritis in the foot or ankle experience, in varying degrees, one or more of the following:  Pain and stiffness in the joint   Swelling in or near the joint   Difficulty walking or bending the joint   Some patients with osteoarthritis also develop a bone spur (a bony protrusion) at the affected joint. Shoe pressure may cause pain at the site of a bone spur, and in some cases blisters or calluses may form over its surface. Bone spurs can also limit  the movement of the joint.  Diagnosis  In diagnosing osteoarthritis, the foot and ankle surgeon will examine the foot thoroughly, looking for swelling in the joint, limited mobility, and pain with movement. In some cases, deformity and/or enlargement (spur) of the joint may be noted. X-rays may be ordered to evaluate the extent of the disease.  Non-surgical Treatment  To help relieve symptoms, the surgeon may begin treating osteoarthritis with one or more of the following non-surgical approaches:  Oral medications. Nonsteroidal anti-inflammatory drugs (NSAIDs), such as ibuprofen, are often helpful in reducing the inflammation and pain. Occasionally a prescription for a steroid medication is needed to adequately reduce symptoms.   Orthotic devices. Custom orthotic devices (shoe inserts) are often prescribed to provide support to improve the foot s mechanics or cushioning to help minimize pain.   Bracing. Bracing, which restricts motion and supports the joint, can reduce pain during walking and help prevent further deformity.   Immobilization. Protecting the foot from movement by wearing a cast or removable cast-boot may be necessary to allow the inflammation to resolve.   Steroid injections. In some cases, steroid injections are applied to the affected joint to deliver anti-inflammatory medication.   Physical therapy. Exercises to strengthen the muscles, especially when the osteoarthritis occurs in the ankle, may give the patient greater stability and help avoid injury that might worsen the condition.   When Is Surgery Needed?  When osteoarthritis has progressed substantially or failed to improve with non-surgical treatment, surgery may be recommended. In advanced cases, surgery may be the only option. The goal of surgery is to decrease pain and improve function. The foot and ankle surgeon will consider a number of factors when selecting the procedure best suited to the patient s condition and lifestyle.     [employer]

## 2024-06-25 DIAGNOSIS — J45.40 MODERATE PERSISTENT ASTHMA WITHOUT COMPLICATION: ICD-10-CM

## 2024-06-25 RX ORDER — AZELASTINE HYDROCHLORIDE 137 UG/1
SPRAY, METERED NASAL
Qty: 30 ML | Refills: 1 | Status: SHIPPED | OUTPATIENT
Start: 2024-06-25 | End: 2024-07-29

## 2024-07-10 ENCOUNTER — HOSPITAL ENCOUNTER (OUTPATIENT)
Dept: MRI IMAGING | Facility: CLINIC | Age: 54
Discharge: HOME | End: 2024-07-10
Attending: PODIATRIST | Admitting: PODIATRIST
Payer: COMMERCIAL

## 2024-07-10 DIAGNOSIS — M21.41 BILATERAL PES PLANUS: ICD-10-CM

## 2024-07-10 DIAGNOSIS — M25.572 PAIN IN JOINT INVOLVING ANKLE AND FOOT, LEFT: ICD-10-CM

## 2024-07-10 DIAGNOSIS — M76.72 PERONEAL TENDONITIS, LEFT: ICD-10-CM

## 2024-07-10 DIAGNOSIS — M21.42 BILATERAL PES PLANUS: ICD-10-CM

## 2024-07-10 DIAGNOSIS — M19.072 ARTHRITIS OF LEFT ANKLE: ICD-10-CM

## 2024-07-10 PROCEDURE — 73721 MRI JNT OF LWR EXTRE W/O DYE: CPT | Mod: LT

## 2024-07-10 PROCEDURE — 73721 MRI JNT OF LWR EXTRE W/O DYE: CPT | Mod: 26 | Performed by: RADIOLOGY

## 2024-07-12 ENCOUNTER — MYC MEDICAL ADVICE (OUTPATIENT)
Dept: PODIATRY | Facility: CLINIC | Age: 54
End: 2024-07-12
Payer: COMMERCIAL

## 2024-07-12 NOTE — TELEPHONE ENCOUNTER
Pinky Orozco,     Your MRI results show that you have some tendinitis.  No tendon tear which you do have a tear in your plantar fascia.  I recommend following up in clinic to discuss surgical versus nonsurgical options.    Recommend continuing to wear the ankle brace at this time.    Linnette Garcia DPM

## 2024-07-20 ENCOUNTER — MYC MEDICAL ADVICE (OUTPATIENT)
Dept: PEDIATRICS | Facility: CLINIC | Age: 54
End: 2024-07-20
Payer: COMMERCIAL

## 2024-07-22 NOTE — TELEPHONE ENCOUNTER
"See patient's Arradiancehart message   - Patient requesting to start weight loss medication; preferably Wegovy   - Patient states that she discussed this with her provider in the past but did not start weight loss medications at that time due to the cost     Upon chart review:   - 8/7/2023 Virtual Visit with JOSEFINA Bustillo CNP states:     \"(E66.01) Morbid obesity (H)  Comment: Gaining weight on phentermine. GLP-1 not covered. Previously lost weight on topamax. No known contraindications. She doesn't know why it was stopped.   Plan: phentermine (ADIPEX-P) 15 MG capsule,   -See above for plan to re-start phentermine. Consider increasing in the future if BP well controlled and tolerating (previously had insomnia on higher doses)  -Start topamax today. Reviewed r/b/se  -Asked her to see MTM in a month to check in on phentermine and topamax. Increase as able. In my absence, MARISOL Barrera can sign phentermine prescriptions. If MTM could also review options for possible GLP-1 coverage that would be helpful. Please also encourage her to stick to a diet tracking system (weight watchers)\"     Sent a Arradiancehart message to the patient   - Informed the patient to submit an eVisit to request the medication from her provider     Mendy VIRGEN RN   Kno Paulding   "

## 2024-07-23 ENCOUNTER — OFFICE VISIT (OUTPATIENT)
Dept: PODIATRY | Facility: CLINIC | Age: 54
End: 2024-07-23
Payer: COMMERCIAL

## 2024-07-23 VITALS — WEIGHT: 257 LBS | DIASTOLIC BLOOD PRESSURE: 64 MMHG | SYSTOLIC BLOOD PRESSURE: 100 MMHG | BODY MASS INDEX: 47.16 KG/M2

## 2024-07-23 DIAGNOSIS — M76.72 PERONEAL TENDONITIS, LEFT: ICD-10-CM

## 2024-07-23 DIAGNOSIS — M25.572 CHRONIC PAIN OF LEFT ANKLE: Primary | ICD-10-CM

## 2024-07-23 DIAGNOSIS — G89.29 CHRONIC PAIN OF LEFT ANKLE: Primary | ICD-10-CM

## 2024-07-23 DIAGNOSIS — M19.072 ARTHRITIS OF LEFT FOOT: ICD-10-CM

## 2024-07-23 DIAGNOSIS — M62.9 NONTRAUMATIC TEAR OF PLANTAR FASCIA: ICD-10-CM

## 2024-07-23 PROCEDURE — 99214 OFFICE O/P EST MOD 30 MIN: CPT | Performed by: PODIATRIST

## 2024-07-23 NOTE — PROGRESS NOTES
Podiatry / Foot and Ankle Surgery Progress Note    July 23, 2024    Subject: Patient was seen for follow-up on MRI results.    Objective:  Vitals: /64   Wt 116.6 kg (257 lb)   LMP  (LMP Unknown)   BMI 47.16 kg/m      General:  Patient is alert and orientated.  NAD.    Dermatologic: Skin is intact to both lower extremities without significant lesions, rash or abrasion.  No paronychia or evidence of soft tissue infection is noted.     Vascular: DP & PT pulses are intact & regular bilaterally.  No significant edema or varicosities noted.  CFT and skin temperature is normal to both lower extremities.     Neurologic: Lower extremity sensation is intact to light touch.  No evidence of weakness or contracture in the lower extremities.  No evidence of neuropathy.     Musculoskeletal: Patient is ambulatory without assistive device or brace.  Decreased arch height bilaterally.  Pain on palpation along the left peroneal tendon and with eversion and inversion of the left ankle.  Pain on the medial gutter of the left ankle joint.  Pain on palpation to the posterior tibial tendon insertion on the left foot.     Radiographs: left ankle xrays - I personally reviewed the xrays.  Moderate-sized calcaneal enthesophytes. Mild  osteoarthrosis of the ankle joint. There is no evidence of fracture or  talar dome osteochondral lesion. The ankle mortise is symmetric.     MRI left ankle -    Tenosynovitis surrounding the left ankle peroneal tendons with mild  tendinosis of the peroneal longus tendon.     2. Partial thickness tearing of the central cord of the proximal  plantar fascia extending for at least 2.2 cm. There is surrounding  soft tissue edema with bone marrow edema in the adjacent plantar  calcaneus, at its attachment site.     3. The remaining tendinous and ligamentous structures about the left  ankle are intact.    Assessment:     Medical Decision Making/Plan: Reviewed and discussed MRI results.  Talked about the tearing  of the plantar fascia.  Talked about surgical versus nonsurgical options.  Discussed nonsurgically we could try a boot and physical therapy with iontophoresis but sometimes the tear remains in the pain can continue.  At this time would recommend surgically going in and completing the tear to try to help with the pain.  Also would recommend lengthening the Achilles tendon at this time to help decrease stress and tension on this area as well.    Discussed that this would be a same-day procedure under general anesthesia.  She would be nonweightbearing for 2 weeks likely using a knee roller and then minimal weightbearing in a boot for 2 to 4 weeks after that.    Talked about risks including infection, numbness, continued pain, recurrence, need for further surgery, blood loss, blood clotting. You will scar.     She would like to proceed with surgery.  All questions were answered to patient's satisfaction and she will call further questions or concerns.      Patient Risk Factor:  Patient is a low risk factor for infection.     Linnette Garcia DPM, Podiatry/Foot and Ankle Surgery

## 2024-07-23 NOTE — PATIENT INSTRUCTIONS
Thank you for choosing [facility] Podiatry / Foot & Ankle Surgery!    DR ECHOLS'S CLINIC:  [facility]   [address]   [place] [address]      TRIAGE LINE: [phone]  APPOINTMENTS: [phone]  RADIOLOGY: [phone]  SET UP SURGERY: [phone]  PHYSICAL THERAPY: [phone]   FAX NUMBER: [phone]  BILLING QUESTIONS: [phone]       Follow up: Surgery      GETTING READY FOR YOUR SURGERY  ONE-THREE WEEKS BEFORE  1. See your Family Doctor or Primary Care Doctor for a History and Physical. If you do not, we may need to change the date of your surgery.  2. Please see pre-surgical medications below to which medications need to be stopped before surgery and when.    TEN OR MORE DAYS BEFORE    1. [place] with the hospital. (For [facility])      By Phone: [phone].      By Internet: [email or web address]. Choose [facility].      If you do not register by phone or online, we will call to help you register.    SAME DAY SURGERY PATIENTS  1. You will need a family member of friend to drive you home. If you do not have one the surgery will be cancelled or rescheduled.  2. You will need a responsible adult to stay with you that night after the surgery.       We will ask this person to listen to some instructions before you leave the hospital.  * If your child is having surgery, and you would like a tour of the hospital, please call: [phone].      DAY BEFORE SURGERY  1. DO NOT EAT OR DRINK ANYTHING AFTER MIDNIGHT THE NIGHT BEFORE YOUR SURGERY!   2. DO NOT DRINK ALCOHOL.  3. Do not take over the counter drugs.  4. Some people need to have blood tests at the hospital. If you need blood tests, we will tell you in advance.  5. Take medications as directed by your doctor. You may take these with a small amount of water.  6. Do not chew gum, chew tobacco, or suck on hard candy the day of surgery.  7. Bring your insurance cards, a list of your medicines and  co-pays you might need. Leave jewelry and other valuables at home.  8. If you received papers at your doctor's office, bring these with you to the surgery.    If you have questions about these instructions, please call: [phone]  Ask to speak with a pre-admitting nurse.    PRESURGICAL MEDICATIONS:  Certain prescription, over-the-counter, and herbal medications interfere with healing after an operation. The main concern relates to medications that increase bleeding at the surgical site. Excess blood under the incision results in poor wound healing, excess pain, increased scarring, and a higher risk of infection.    Some medications slow the healing process of bone. Medications can also interfere with the anesthesia drugs that keep you asleep during the operation. It is important to ensure that these medications are out of your system prior to the operation. The list below details a number of medications that are of concern. Pay special attention to how long you should avoid these medications before your operation. Please note that this list is not complete. You should ask your surgeon or pharmacist if you are uncertain about other medications. Any herbal supplement not listed should be discontinued at least one week prior to surgery.    Aspirin: Hold for one week prior to surgery and restart the day after surgery. This over the counter medication promotes bleeding.    Motrin / Ibuprofen / Aleve / Advil / NSAIDS:  Stop one week prior to surgery. These medications affect bleeding and may cause delay in bone healing. Avoid taking these medications for six weeks after bone surgeries. Other procedures may allow you to restart sooner than 6 weeks after surgery.    Coumadin / Plavix: Your primary care provider will manage Coumadin in relation to surgery. Coumadin may result in excessive bleeding and may be adjusted before and after surgery.    Enbriel: Stop two weeks prior to surgery and restart two weeks after surgery.  This medication can effect soft tissue healing and increases the risk of infection.    Remicade: Stop 8-12 weeks before surgery and restart two weeks after surgery. This medication can affect soft tissue healing and increases the risk of infection.    Humira: Stop 4 weeks before surgery and restart two weeks after surgery. This medication can affect soft tissue healing and increases the risk of infection.    Methotrexate: Stop one dose prior to surgery. This medication will be restarted when the wound appears to be healing well. Please ask your surgeon about restarting this medication when you are being seen in the office for wound checks.    Kava: Stop at least one day prior to surgery and may restart one day after surgery. Kava may increase the sedative effect of anesthetics that are given during the operation. Kava can also increase bleeding at the surgical site.    Ephedra (ma turner): Stop at least one day prior to surgery and may restart one day after surgery.  Ephedra may increase the risk of heart attack and stroke. This medication can also increase bleeding at the surgical site.    Marsha's Wort: Stop at least five days before surgery and may restart one day after surgery. [place]'s wort may diminish the effects of several drugs that are given during surgery.    Ginseng: Stop at least one week prior to surgery and may restart one day after surgery.  Ginseng lowers blood sugar and may increase bleeding at the surgical site.    Ginkgo: Stop 36 hours before surgery and may restart one day after surgery. Ginkgo may increase bleeding at the surgical site.    Garlic: Stop at least one week prior to surgery and may restart one day after. Garlic may increase bleeding at the surgery site.    Valerian: Do a slow steady decrease in your daily dose over a period of 2-3 weeks before surgery to decrease the chance of withdrawal symptoms. Valerian may increase the sedative effect of anesthetics given during the  operation.    Echinacea: There is no data on stopping echinacea prior to surgery. This medication though can be associated with allergic reactions and suppression of your immune system.    Vitamin E, Omega-3, Flax, Fish Oil, Glucosamine and Chondroitin: Stop 2 weeks prior to surgery and may restart one day after. These herbal medications can increase risk of bleeding at surgical site.      POST OPERATIVE HOME CARE INSTRUCTIONS  Activities: You should rest today. Stay off your feet as much as possible and keep your foot elevated above the level of your heart (about two pillow height). Wear your surgical shoe at all times when up. Limit walking to 5 to 10 minutes per hour over the next few days if your doctor has previously told you that you can put some weight on the foot after surgery, although limit the weight to your heel. If you are supposed to be non-weight bearing, that means NO WEIGHT AT ALL ON THE FOOT. Use an ice pack on the ankle while awake 20 minutes per hour to help decrease pain and swelling.     Discomfort: If a prescription for pain was given, take as directed. If no pain medication was ordered, you may take a non-prescription, non-aspirin pain medication. If the pain is not relieved by pain medication, call the clinic.     Incision and Dressing: Your surgical dressing is a sterile dressing and should be left in place until removed by your physician. Keep the dressing dry by covering it with a plastic bag for showers, taking baths with the surgical foot out of the tub, or sponge bathing. Some bleeding on the dressing should be expected. If however, you notice active or excessive bleeding or a temperature over 100 degrees by mouth, call the clinic. Do not change dressing by yourself.  If the dressing becomes wet or dirty, please call the clinic as it may need a new sterile dressing applied. You may start getting the foot wet after the stitches are removed.     Do not wear regular shoes with a surgical  bandage and/or external pins in your foot. Wear loose fitting clothing that easily will slip over the bandage and/or pins. Do not cover your surgical foot with blankets as they may damage the dressing/pins. Also, remember that dogs are not aware of your surgery. Please keep them away from the bandage/pins.   If your surgeon places external pins in your foot, you must keep the foot dry until the pins are removed at 6-8 weeks after the surgery. Pins should be covered with a dressing for protection. You should examine the pins and your skin often. Check for any spreading redness or yellow drainage from the pin areas. Do not apply ointment around the pins. Do not push a loose pin back into your foot. Please call the clinic if the pin is spinning or moving in and out. If the pins are bumped or loosened they may need to be removed early. This may affect your surgical outcome.   Please call the clinic if you feel there is a problem with your pins and/or surgical bandage.    TIPS FOR SUCCESSFUL HEALING  How you care for the surgical site is critically important to achieve a successful result after surgery. Avoidance of injury, infection, excess swelling, scar tissue and stiffness are highly dependent on the care you provide over the next six weeks. Please do not hesitate to call if you have questions or concerns.   Your foot requires significant rest and elevation. Sitting for long hours with your foot elevated, however, will create its own problems. Expect muscle aches, back pain, cramps, etc. Optimal posture, lumbar support, back exercises, ice and heat may all help with your new aches and pains. Do not apply a heating pad to your foot or leg as this can cause increase swelling and pain. Rather use ice in those areas.   Pain medications cause drowsiness. You may frequently sleep during the day and then have trouble sleeping at night. Over the counter sleep aids might be more effective than narcotic pain medication to  achieve a reasonable night's sleep.    Narcotic pain medications and inactivity lead to constipation. Limiting use of narcotics will help minimize this problem. The pain medications will not completely alleviate your pain. The purpose of pain pills is to take the edge off and help you get through the first few days. You can substitute Extra Strength Tylenol if pain is mild. Please note that narcotic pain pills usually contain acetaminophen (the active ingredient in Tylenol) so be careful to avoid the maximum dose of acetaminophen. You should take measures to avoid constipation by drinking plenty of water, eating lots of fruit and vegetables and taking the recommended dose of Metamucil or a similar fiber supplement. These measures should be continued for as long as you require narcotic pain medications and are inactive.     Showering is a major challenge. Your incision requires about three days to become sealed from water. Your bandage should not get wet and should not be removed. Do not attempt showering for the first three days. A sponge bath is preferred. You may attempt to shower on the fourth day after the operation. Your foot should be covered with a bag, tape and rubber bands. Double bagging is preferred. Standing in the shower with a bag on your foot is quite hazardous. A portable shower stool would be ideal. The bandage will need to be changed in the office if it becomes moistened. A moist bandage will not dry on its own. A moist dressing may lead to infection.   Stiffness will develop after any operation due to scarring. The scar tissue begins to form immediately after the surgery. Inactivity can cause excess stiffness and may lead to blood clots in your legs. Frequent range of motion exercises will help decrease stiffness, blood clots, scar tissue and adhesions. Please call if you are unsure about these recommendations.   Good luck and best wishes on a prompt recovery. Healing is slow but an important step  in your recovery. You are in control of the final result. Please use this time wisely. Please do not hesitate to call if you have questions, concerns or comments.    * If you have any post-operative questions or concerns regarding your procedure, call our triage team at the [facility] at [phone].     GETTING READY FOR YOUR SURGERY  ONE-THREE WEEKS BEFORE  1. See your Family Doctor or Primary Care Doctor for a History and Physical. If you do not, we may need to change the date of your surgery.  2. Please see pre-surgical medications below to which medications need to be stopped before surgery and when.    TEN OR MORE DAYS BEFORE    1. [place] with the hospital. (For [facility])      By Phone: [phone].      By Internet: [email or web address]. Choose [facility].      If you do not register by phone or online, we will call to help you register.    SAME DAY SURGERY PATIENTS  1. You will need a family member of friend to drive you home. If you do not have one the surgery will be cancelled or rescheduled.  2. You will need a responsible adult to stay with you that night after the surgery.       We will ask this person to listen to some instructions before you leave the hospital.  * If your child is having surgery, and you would like a tour of the hospital, please call: [phone].      DAY BEFORE SURGERY  1. DO NOT EAT OR DRINK ANYTHING AFTER MIDNIGHT THE NIGHT BEFORE YOUR SURGERY!   2. DO NOT DRINK ALCOHOL.  3. Do not take over the counter drugs.  4. Some people need to have blood tests at the hospital. If you need blood tests, we will tell you in advance.  5. Take medications as directed by your doctor. You may take these with a small amount of water.  6. Do not chew gum, chew tobacco, or suck on hard candy the day of surgery.  7. Bring your insurance cards, a list of your medicines and co-pays you might need. Leave jewelry and other valuables at home.  8. If you  received papers at your doctor's office, bring these with you to the surgery.    If you have questions about these instructions, please call: [phone]  Ask to speak with a pre-admitting nurse.    PRESURGICAL MEDICATIONS:  Certain prescription, over-the-counter, and herbal medications interfere with healing after an operation. The main concern relates to medications that increase bleeding at the surgical site. Excess blood under the incision results in poor wound healing, excess pain, increased scarring, and a higher risk of infection.    Some medications slow the healing process of bone. Medications can also interfere with the anesthesia drugs that keep you asleep during the operation. It is important to ensure that these medications are out of your system prior to the operation. The list below details a number of medications that are of concern. Pay special attention to how long you should avoid these medications before your operation. Please note that this list is not complete. You should ask your surgeon or pharmacist if you are uncertain about other medications. Any herbal supplement not listed should be discontinued at least one week prior to surgery.    Aspirin: Hold for one week prior to surgery and restart the day after surgery. This over the counter medication promotes bleeding.    Motrin / Ibuprofen / Aleve / Advil / NSAIDS:  Stop one week prior to surgery. These medications affect bleeding and may cause delay in bone healing. Avoid taking these medications for six weeks after bone surgeries. Other procedures may allow you to restart sooner than 6 weeks after surgery.    Coumadin / Plavix: Your primary care provider will manage Coumadin in relation to surgery. Coumadin may result in excessive bleeding and may be adjusted before and after surgery.    Enbriel: Stop two weeks prior to surgery and restart two weeks after surgery. This medication can effect soft tissue healing and increases the risk of  infection.    Remicade: Stop 8-12 weeks before surgery and restart two weeks after surgery. This medication can affect soft tissue healing and increases the risk of infection.    Humira: Stop 4 weeks before surgery and restart two weeks after surgery. This medication can affect soft tissue healing and increases the risk of infection.    Methotrexate: Stop one dose prior to surgery. This medication will be restarted when the wound appears to be healing well. Please ask your surgeon about restarting this medication when you are being seen in the office for wound checks.    Kava: Stop at least one day prior to surgery and may restart one day after surgery. Kava may increase the sedative effect of anesthetics that are given during the operation. Kava can also increase bleeding at the surgical site.    Ephedra (ma turner): Stop at least one day prior to surgery and may restart one day after surgery.  Ephedra may increase the risk of heart attack and stroke. This medication can also increase bleeding at the surgical site.    [place]'s Wort: Stop at least five days before surgery and may restart one day after surgery. Marsha's wort may diminish the effects of several drugs that are given during surgery.    Ginseng: Stop at least one week prior to surgery and may restart one day after surgery.  Ginseng lowers blood sugar and may increase bleeding at the surgical site.    Ginkgo: Stop 36 hours before surgery and may restart one day after surgery. Ginkgo may increase bleeding at the surgical site.    Garlic: Stop at least one week prior to surgery and may restart one day after. Garlic may increase bleeding at the surgery site.    Valerian: Do a slow steady decrease in your daily dose over a period of 2-3 weeks before surgery to decrease the chance of withdrawal symptoms. Valerian may increase the sedative effect of anesthetics given during the operation.    Echinacea: There is no data on stopping echinacea prior to surgery.  This medication though can be associated with allergic reactions and suppression of your immune system.    Vitamin E, Omega-3, Flax, Fish Oil, Glucosamine and Chondroitin: Stop 2 weeks prior to surgery and may restart one day after. These herbal medications can increase risk of bleeding at surgical site.     POTENTIAL COMPLICATIONS OF FOOT & ANKLE SURGERY  Undergoing a surgical procedure involves a certain amount of risk. Risks of complications vary depending on the complexity of the surgery and how you take care of the surgical area during the healing process. Complications can range from minor infection to death. Some complications are temporary while others will be permanent.  Your surgeon weighs the risk of complications vs the potential benefit of undergoing surgery. You need to consider your tolerance for unexpected problems as you decide whether to undergo surgery.    Foot and Ankle surgery involves cutting skin, bone, ligaments, blood vessels and joints.  These structures heal well but not without consequence. Any break to the skin can lead to infection. A deep infection involves bones or joints which can be devastating. Deep infection can lead to amputation or could spread to other parts of your body. Most infections are minor and easily treated with oral antibiotics. Infections are often times from bacteria already present on your skin. Proper care of the surgical site is an essential component of avoiding infection. Do not get the bandage wet and take proper care of external pins to avoid these problems.     Joint stiffness is inherent to any foot or ankle surgery. Joint surgery is a major component of reconstructive foot and ankle procedures. The ligaments and tissues around the joint are cut, and later repaired. Scare tissue limits joint mobility. This can be permanent but generally improves over the course of one year.    Surgery involves dissection around nerves. Visible nerves are moved out of the  way while very small nerves are cut. Scar tissue develops around nerves and can lead to nerve pain, numbness, or neuromas. Nerve symptoms can be permanent. This can lead to numbness or sometimes hypersensitivity to touch and problems wearing shoes.    Bones do not always heal after surgery. Poor healing after a bone cut or joint fusion can lead to an extended period of casting or repeat surgery. Electronic bone stimulators are sometimes used to stimulate poor healing of bone. Nonunion is when joint fusion does not take.  This can occur as often as 10% of the time. Smoking doubles your risk of poor bone healing to 20%.    Bone grafting is sometimes necessary during the original or subsequent surgery. Bone is sometimes taken from other parts of your body or freeze dried bone from a bone bank from a bone bank or synthetic bone material might be used.    A scar is always present after foot and ankle surgery. The scar will be visible and could be sensitive. Some people develop excessive scarring, which cannot be controlled by the surgeon. Scars can be unsightly and can restrict joint mobility.    Blood clots can develop in the calf after surgery. Foot and ankle surgery is a predisposing factor for blood clots. The blood clot could break and travel to your lung.  This condition can lead to death. Early warning signs could include calf swelling and pain, chest pain or shortness of breath. This is an emergency that requires immediate attention by a medical doctor!    Surgery will not necessarily create a pain-free foot. Even normal feet hurt. Crooked toes, bunions, neuromas, flat feet and arthritis should all be considered permanent conditions.  Ankle pain commonly requires multiple surgeries over a lifetime. Do not assume that having surgery will permanently fix your condition. You may need permanent alteration in shoes and activities to accommodate your foot and ankle problem.    Careful attention to post-operative  recommendations will dramatically reduce your risk of complications. Proper dressing, wound care, elevation and rest will be essential to get the wound healed and minimize scarring. Strict attention to activity restrictions, such as non-weight bearing, or partial weight bearing is essential. Internal fixation devices may not resist the stress of walking. Some select surgeries allow the patient to walk, however this should be very minimal.    Despite these concerns, foot and ankle surgery leads to a high level of patient satisfaction. Your surgeon would not recommend surgery if he/she did not expect your foot to improve. Talk to your surgeon about any of the above issues.    POST OPERATIVE HOME CARE INSTRUCTIONS  Activities: You should rest today. Stay off your feet as much as possible and keep your foot elevated above the level of your heart (about two pillow height). Wear your surgical shoe at all times when up. Limit walking to 5 to 10 minutes per hour over the next few days if your doctor has previously told you that you can put some weight on the foot after surgery, although limit the weight to your heel. If you are supposed to be non-weight bearing, that means NO WEIGHT AT ALL ON THE FOOT. Use an ice pack on the ankle while awake 20 minutes per hour to help decrease pain and swelling.     Discomfort: If a prescription for pain was given, take as directed. If no pain medication was ordered, you may take a non-prescription, non-aspirin pain medication. If the pain is not relieved by pain medication, call the clinic.     Incision and Dressing: Your surgical dressing is a sterile dressing and should be left in place until removed by your physician. Keep the dressing dry by covering it with a plastic bag for showers, taking baths with the surgical foot out of the tub, or sponge bathing. Some bleeding on the dressing should be expected. If however, you notice active or excessive bleeding or a temperature over 100  degrees by mouth, call the clinic. Do not change dressing by yourself.  If the dressing becomes wet or dirty, please call the clinic as it may need a new sterile dressing applied. You may start getting the foot wet after the stitches are removed.     Do not wear regular shoes with a surgical bandage and/or external pins in your foot. Wear loose fitting clothing that easily will slip over the bandage and/or pins. Do not cover your surgical foot with blankets as they may damage the dressing/pins. Also, remember that dogs are not aware of your surgery. Please keep them away from the bandage/pins.   If your surgeon places external pins in your foot, you must keep the foot dry until the pins are removed at 6-8 weeks after the surgery. Pins should be covered with a dressing for protection. You should examine the pins and your skin often. Check for any spreading redness or yellow drainage from the pin areas. Do not apply ointment around the pins. Do not push a loose pin back into your foot. Please call the clinic if the pin is spinning or moving in and out. If the pins are bumped or loosened they may need to be removed early. This may affect your surgical outcome.   Please call the clinic if you feel there is a problem with your pins and/or surgical bandage.    TIPS FOR SUCCESSFUL HEALING  How you care for the surgical site is critically important to achieve a successful result after surgery. Avoidance of injury, infection, excess swelling, scar tissue and stiffness are highly dependent on the care you provide over the next six weeks. Please do not hesitate to call if you have questions or concerns.   Your foot requires significant rest and elevation. Sitting for long hours with your foot elevated, however, will create its own problems. Expect muscle aches, back pain, cramps, etc. Optimal posture, lumbar support, back exercises, ice and heat may all help with your new aches and pains. Do not apply a heating pad to your  foot or leg as this can cause increase swelling and pain. Rather use ice in those areas.   Pain medications cause drowsiness. You may frequently sleep during the day and then have trouble sleeping at night. Over the counter sleep aids might be more effective than narcotic pain medication to achieve a reasonable night's sleep.    Narcotic pain medications and inactivity lead to constipation. Limiting use of narcotics will help minimize this problem. The pain medications will not completely alleviate your pain. The purpose of pain pills is to take the edge off and help you get through the first few days. You can substitute Extra Strength Tylenol if pain is mild. Please note that narcotic pain pills usually contain acetaminophen (the active ingredient in Tylenol) so be careful to avoid the maximum dose of acetaminophen. You should take measures to avoid constipation by drinking plenty of water, eating lots of fruit and vegetables and taking the recommended dose of Metamucil or a similar fiber supplement. These measures should be continued for as long as you require narcotic pain medications and are inactive.     Showering is a major challenge. Your incision requires about three days to become sealed from water. Your bandage should not get wet and should not be removed. Do not attempt showering for the first three days. A sponge bath is preferred. You may attempt to shower on the fourth day after the operation. Your foot should be covered with a bag, tape and rubber bands. Double bagging is preferred. Standing in the shower with a bag on your foot is quite hazardous. A portable shower stool would be ideal. The bandage will need to be changed in the office if it becomes moistened. A moist bandage will not dry on its own. A moist dressing may lead to infection.   Stiffness will develop after any operation due to scarring. The scar tissue begins to form immediately after the surgery. Inactivity can cause excess stiffness  and may lead to blood clots in your legs. Frequent range of motion exercises will help decrease stiffness, blood clots, scar tissue and adhesions. Please call if you are unsure about these recommendations.   Good luck and best wishes on a prompt recovery. Healing is slow but an important step in your recovery. You are in control of the final result. Please use this time wisely. Please do not hesitate to call if you have questions, concerns or comments.    * If you have any post-operative questions or concerns regarding your procedure, call our triage team at the [facility] at [phone] (option 4).

## 2024-07-23 NOTE — LETTER
7/23/2024      Pam Gaviria  [address]  [place] [address]      Dear Colleague,    Thank you for referring your patient, Pam Gaviria, to the [facility]. Please see a copy of my visit note below.    Podiatry / Foot and Ankle Surgery Progress Note    July 23, 2024    Subject: Patient was seen for follow-up on MRI results.    Objective:  Vitals: /64   Wt 116.6 kg (257 lb)   LMP  (LMP Unknown)   BMI 47.16 kg/m      General:  Patient is alert and orientated.  NAD.    Dermatologic: Skin is intact to both lower extremities without significant lesions, rash or abrasion.  No paronychia or evidence of soft tissue infection is noted.     Vascular: DP & PT pulses are intact & regular bilaterally.  No significant edema or varicosities noted.  CFT and skin temperature is normal to both lower extremities.     Neurologic: Lower extremity sensation is intact to light touch.  No evidence of weakness or contracture in the lower extremities.  No evidence of neuropathy.     Musculoskeletal: Patient is ambulatory without assistive device or brace.  Decreased arch height bilaterally.  Pain on palpation along the left peroneal tendon and with eversion and inversion of the left ankle.  Pain on the medial gutter of the left ankle joint.  Pain on palpation to the posterior tibial tendon insertion on the left foot.     Radiographs: left ankle xrays - I personally reviewed the xrays.  Moderate-sized calcaneal enthesophytes. Mild  osteoarthrosis of the ankle joint. There is no evidence of fracture or  talar dome osteochondral lesion. The ankle mortise is symmetric.     MRI left ankle -    Tenosynovitis surrounding the left ankle peroneal tendons with mild  tendinosis of the peroneal longus tendon.     2. Partial thickness tearing of the central cord of the proximal  plantar fascia extending for at least 2.2 cm. There is surrounding  soft tissue edema with bone marrow edema in the  Monitor. adjacent plantar  calcaneus, at its attachment site.     3. The remaining tendinous and ligamentous structures about the left  ankle are intact.    Assessment:     Medical Decision Making/Plan: Reviewed and discussed MRI results.  Talked about the tearing of the plantar fascia.  Talked about surgical versus nonsurgical options.  Discussed nonsurgically we could try a boot and physical therapy with iontophoresis but sometimes the tear remains in the pain can continue.  At this time would recommend surgically going in and completing the tear to try to help with the pain.  Also would recommend lengthening the Achilles tendon at this time to help decrease stress and tension on this area as well.    Discussed that this would be a same-day procedure under general anesthesia.  She would be nonweightbearing for 2 weeks likely using a knee roller and then minimal weightbearing in a boot for 2 to 4 weeks after that.    Talked about risks including infection, numbness, continued pain, recurrence, need for further surgery, blood loss, blood clotting. You will scar.     She would like to proceed with surgery.  All questions were answered to patient's satisfaction and she will call further questions or concerns.      Patient Risk Factor:  Patient is a low risk factor for infection.     Linnette Garcia DPM, Podiatry/Foot and Ankle Surgery      Again, thank you for allowing me to participate in the care of your patient.        Sincerely,        Linnette Garcia DPM, Podiatry/Foot and Ankle Surgery

## 2024-07-24 ENCOUNTER — MYC MEDICAL ADVICE (OUTPATIENT)
Dept: PODIATRY | Facility: CLINIC | Age: 54
End: 2024-07-24
Payer: COMMERCIAL

## 2024-07-24 ENCOUNTER — TELEPHONE (OUTPATIENT)
Dept: PODIATRY | Facility: CLINIC | Age: 54
End: 2024-07-24
Payer: COMMERCIAL

## 2024-07-24 NOTE — TELEPHONE ENCOUNTER
MELINDA team-Please assist pt with scheduling appointment.     Routed to [facility].     Edilia GARCIA RN   [facility]

## 2024-07-24 NOTE — TELEPHONE ENCOUNTER
Patient has been scheduled for surgery. Details are below.    Date of Surgery: 08/05/24    Approximate Arrival Time: SURGERY CENTER WILL CALL 3/4 DAYS PRIOR TO CONFIRM A TIME   Surgeon:GINO MIGUEL PROVIDERS: Dr. Linnette Garcia    Procedure: left plantar fascial release and - Left  left achilles tendon lengthening - Left    Location: [facility] surgery locations: [facility],  [address] [place] [address]  Surgery Consult: NA  PreOp Physical: CALLING PCP  PostOp: 08/13 & 08/20/24  Packet Mailed/MyChart Sent: YES  Added to [facility]: YES    Spoke to: LIZET

## 2024-07-25 NOTE — TELEPHONE ENCOUNTER
Pinky Orozco,    I know it was a lot of information thrown at you at our visit.     You will have 2 incisions (one by the heel and one to the back of the ankle, each about 2-3 cm long.  I am the surgeon. Procedures are called:  1.  Plantar fasciotomy and 2. Gastrocnemius lengthening.  I do not have any specific videos on the surgeries but you could look on youtube or google for videos.  It has a good success rate of about 85%.      I hope this answers your questions. If you have more, it might be easier to come back in and see me or we could do a telephone visit as well.     Linnette Garcia DPM

## 2024-07-29 ENCOUNTER — TELEPHONE (OUTPATIENT)
Dept: PEDIATRICS | Facility: CLINIC | Age: 54
End: 2024-07-29

## 2024-07-29 ENCOUNTER — VIRTUAL VISIT (OUTPATIENT)
Dept: PEDIATRICS | Facility: CLINIC | Age: 54
End: 2024-07-29
Payer: COMMERCIAL

## 2024-07-29 DIAGNOSIS — Z12.11 SPECIAL SCREENING FOR MALIGNANT NEOPLASMS, COLON: ICD-10-CM

## 2024-07-29 DIAGNOSIS — Z12.31 ENCOUNTER FOR SCREENING MAMMOGRAM FOR BREAST CANCER: ICD-10-CM

## 2024-07-29 DIAGNOSIS — Z00.00 HEALTH CARE MAINTENANCE: ICD-10-CM

## 2024-07-29 DIAGNOSIS — E66.01 MORBID OBESITY (H): Primary | ICD-10-CM

## 2024-07-29 DIAGNOSIS — J45.40 MODERATE PERSISTENT ASTHMA WITHOUT COMPLICATION: ICD-10-CM

## 2024-07-29 DIAGNOSIS — E78.5 HYPERLIPIDEMIA, UNSPECIFIED HYPERLIPIDEMIA TYPE: ICD-10-CM

## 2024-07-29 PROBLEM — M25.561 RIGHT KNEE PAIN: Status: RESOLVED | Noted: 2018-05-28 | Resolved: 2024-07-29

## 2024-07-29 PROCEDURE — 99443 PR PHYSICIAN TELEPHONE EVALUATION 21-30 MIN: CPT | Mod: 93 | Performed by: NURSE PRACTITIONER

## 2024-07-29 RX ORDER — LEVALBUTEROL TARTRATE 45 UG/1
1-2 AEROSOL, METERED ORAL EVERY 6 HOURS PRN
Qty: 15 G | Refills: 3 | Status: SHIPPED | OUTPATIENT
Start: 2024-07-29 | End: 2024-07-31

## 2024-07-29 RX ORDER — FLUTICASONE PROPIONATE AND SALMETEROL 500; 50 UG/1; UG/1
1 POWDER RESPIRATORY (INHALATION) EVERY 12 HOURS
Qty: 1 EACH | Refills: 11 | Status: SHIPPED | OUTPATIENT
Start: 2024-07-29

## 2024-07-29 RX ORDER — MONTELUKAST SODIUM 10 MG/1
1 TABLET ORAL AT BEDTIME
Qty: 90 TABLET | Refills: 3 | Status: SHIPPED | OUTPATIENT
Start: 2024-07-29

## 2024-07-29 RX ORDER — AZELASTINE HYDROCHLORIDE 137 UG/1
1 SPRAY, METERED NASAL 2 TIMES DAILY
Qty: 90 ML | Refills: 3 | Status: SHIPPED | OUTPATIENT
Start: 2024-07-29 | End: 2024-09-06

## 2024-07-29 NOTE — TELEPHONE ENCOUNTER
[facility] Prior Authorization Team   Phone: [phone]      PRIOR AUTHORIZATION DENIED    Medication: ZEPBOUND 2.5 MG/0.5ML SC SOAJ  Insurance Company: [employer] Non-Specialty PA's - Phone [phone] Fax [phone]  Denial Date: 7/29/2024  Denial Reason(s):           Appeal Information: Patient insurance does offer an appeal option.  However, since the medication is excluded from coverage, obtaining an approval will be difficult.  Patient Notified: No. The [employer] PA Team does not notify of the denial outcomes as the patient often will ask what their provider will prescribe in place of the denied medication, or additional information in regards to other therapies they can take in place of the denied medication.  This is not something we can advise on in our department.

## 2024-07-29 NOTE — PROGRESS NOTES
Pam is a 53 year old who is being evaluated via a billable telephone visit.      Originating Location (pt. Location): Home    Distant Location (provider location):  Off-site    Assessment & Plan     Morbid obesity (H)  Felt phentermine was no longer effective. Did tolerate low doses of topamax but didn't feel it was strong enough to help her lose enough weight. Diet and exercise could be improved, but doesn't feel she loses weight even when being strict  - tirzepatide-Weight Management (ZEPBOUND) 2.5 MG/0.5ML prefilled pen; Inject 0.5 mLs (2.5 mg) subcutaneously every 7 days for 28 days  - Hemoglobin A1c; Future  - Lipid panel reflex to direct LDL Fasting; Future  - TSH with free T4 reflex; Future  -30g protein TID.   -Reviewed checking calories on takeout. Reduce/eliminate sauces, sour cream, cheese, etc when ordering wraps from [employer]  -Focus on 10-15 minutes walking per day. Will increase exercise as able. Upcoming foot surgery  -Will try to send zepbound in. If not covered will send compounded wegovy and pay OOP. Reviewed r/b/se and how to use  -Follow-up q 3 months. Has physical scheduled in 3 months    Hyperlipidemia, unspecified hyperlipidemia type  See above, recheck  - tirzepatide-Weight Management (ZEPBOUND) 2.5 MG/0.5ML prefilled pen; Inject 0.5 mLs (2.5 mg) subcutaneously every 7 days for 28 days    Special screening for malignant neoplasms, colon  - Fecal colorectal cancer screen (FIT); Future    Encounter for screening mammogram for breast cancer  - MA Screen Bilateral w/[facility]; Future    Moderate persistent asthma without complication  Stable. Well controlled.   - fluticasone-salmeterol (ADVAIR) 500-50 MCG/ACT inhaler; Inhale 1 puff into the lungs every 12 hours  - levalbuterol (XOPENEX HFA) 45 MCG/ACT inhaler; Inhale 1-2 puffs into the lungs every 6 hours as needed for shortness of breath or wheezing  - montelukast (SINGULAIR) 10 MG tablet; Take 1 tablet (10 mg) by mouth at bedtime  -  "azelastine 137 MCG/SPRAY SOLN; Clark 1 spray into both nostrils 2 times daily    Health care maintenance  - Basic metabolic panel  (Ca, Cl, CO2, Creat, Gluc, K, Na, BUN); Future          BMI  Estimated body mass index is 47.16 kg/m  as calculated from the following:    Height as of 5/13/24: 1.572 m (5' 1.9\").    Weight as of 7/23/24: 116.6 kg (257 lb).   Weight management plan: Discussed healthy diet and exercise guidelines          Tomy Orozco is a 53 year old, presenting for the following health issues:  No chief complaint on file.    HPI             Review of Systems  Constitutional, neuro, ENT, endocrine, pulmonary, cardiac, gastrointestinal, genitourinary, musculoskeletal, integument and psychiatric systems are negative, except as otherwise noted.      Objective    Vitals - Patient Reported  Weight (Patient Reported): 115.2 kg (254 lb)        Physical Exam   General: Alert and no distress //Respiratory: No audible wheeze, cough, or shortness of breath // Psychiatric:  Appropriate affect, tone, and pace of words            Phone call duration: 35 minutes, plus 10The longitudinal plan of care for the diagnosis(es)/condition(s) as documented were addressed during this visit. Due to the added complexity in care, I will continue to support Pam in the subsequent management and with ongoing continuity of care. spent in chart review  Signed Electronically by: JOSEFINA FLORES CNP      "

## 2024-07-31 ENCOUNTER — OFFICE VISIT (OUTPATIENT)
Dept: PEDIATRICS | Facility: CLINIC | Age: 54
End: 2024-07-31
Payer: COMMERCIAL

## 2024-07-31 VITALS
SYSTOLIC BLOOD PRESSURE: 111 MMHG | HEIGHT: 61 IN | OXYGEN SATURATION: 96 % | WEIGHT: 256.8 LBS | TEMPERATURE: 97.4 F | HEART RATE: 98 BPM | DIASTOLIC BLOOD PRESSURE: 74 MMHG | BODY MASS INDEX: 48.48 KG/M2

## 2024-07-31 DIAGNOSIS — J30.9 CHRONIC ALLERGIC RHINITIS: ICD-10-CM

## 2024-07-31 DIAGNOSIS — M25.572 CHRONIC PAIN OF LEFT ANKLE: ICD-10-CM

## 2024-07-31 DIAGNOSIS — Z12.11 SPECIAL SCREENING FOR MALIGNANT NEOPLASMS, COLON: ICD-10-CM

## 2024-07-31 DIAGNOSIS — J45.40 MODERATE PERSISTENT ASTHMA WITHOUT COMPLICATION: ICD-10-CM

## 2024-07-31 DIAGNOSIS — J35.8 TONSIL STONE: ICD-10-CM

## 2024-07-31 DIAGNOSIS — E66.01 MORBID OBESITY (H): ICD-10-CM

## 2024-07-31 DIAGNOSIS — E78.5 HYPERLIPIDEMIA, UNSPECIFIED HYPERLIPIDEMIA TYPE: ICD-10-CM

## 2024-07-31 DIAGNOSIS — Z01.818 PRE-OP EXAM: Primary | ICD-10-CM

## 2024-07-31 DIAGNOSIS — M62.9 NONTRAUMATIC TEAR OF PLANTAR FASCIA: ICD-10-CM

## 2024-07-31 DIAGNOSIS — G89.29 CHRONIC PAIN OF LEFT ANKLE: ICD-10-CM

## 2024-07-31 DIAGNOSIS — Z00.00 HEALTH CARE MAINTENANCE: ICD-10-CM

## 2024-07-31 DIAGNOSIS — Z22.7 LATENT TUBERCULOSIS: ICD-10-CM

## 2024-07-31 LAB
ANION GAP SERPL CALCULATED.3IONS-SCNC: 10 MMOL/L (ref 7–15)
BUN SERPL-MCNC: 13.2 MG/DL (ref 6–20)
CALCIUM SERPL-MCNC: 9.4 MG/DL (ref 8.8–10.4)
CHLORIDE SERPL-SCNC: 104 MMOL/L (ref 98–107)
CHOLEST SERPL-MCNC: 234 MG/DL
CREAT SERPL-MCNC: 1 MG/DL (ref 0.51–0.95)
EGFRCR SERPLBLD CKD-EPI 2021: 67 ML/MIN/1.73M2
FASTING STATUS PATIENT QL REPORTED: NO
GLUCOSE SERPL-MCNC: 83 MG/DL (ref 70–99)
HBA1C MFR BLD: 5.3 % (ref 0–5.6)
HCO3 SERPL-SCNC: 25 MMOL/L (ref 22–29)
HDLC SERPL-MCNC: 55 MG/DL
LDLC SERPL CALC-MCNC: 149 MG/DL
NONHDLC SERPL-MCNC: 179 MG/DL
POTASSIUM SERPL-SCNC: 4.5 MMOL/L (ref 3.4–5.3)
SODIUM SERPL-SCNC: 139 MMOL/L (ref 135–145)
TRIGL SERPL-MCNC: 152 MG/DL
TSH SERPL DL<=0.005 MIU/L-ACNC: 3.92 UIU/ML (ref 0.3–4.2)

## 2024-07-31 PROCEDURE — 84443 ASSAY THYROID STIM HORMONE: CPT | Performed by: NURSE PRACTITIONER

## 2024-07-31 PROCEDURE — G2211 COMPLEX E/M VISIT ADD ON: HCPCS | Performed by: NURSE PRACTITIONER

## 2024-07-31 PROCEDURE — 99214 OFFICE O/P EST MOD 30 MIN: CPT | Performed by: NURSE PRACTITIONER

## 2024-07-31 PROCEDURE — 83036 HEMOGLOBIN GLYCOSYLATED A1C: CPT | Performed by: NURSE PRACTITIONER

## 2024-07-31 PROCEDURE — 36415 COLL VENOUS BLD VENIPUNCTURE: CPT | Performed by: NURSE PRACTITIONER

## 2024-07-31 PROCEDURE — 80061 LIPID PANEL: CPT | Performed by: NURSE PRACTITIONER

## 2024-07-31 PROCEDURE — 80048 BASIC METABOLIC PNL TOTAL CA: CPT | Performed by: NURSE PRACTITIONER

## 2024-07-31 ASSESSMENT — PAIN SCALES - GENERAL: PAINLEVEL: MILD PAIN (3)

## 2024-07-31 NOTE — PATIENT INSTRUCTIONS
This is the [facility] # :[phone]   Wait to start until after surgery

## 2024-07-31 NOTE — Clinical Note
Enrrique Garcia. This is not my pt but saw her for a pre-op. BMI is 47.87. I wanted to ensure you are ok with proceeding with surgery with her hx of obesity. She is planning on starting GLP-1 after surgery.

## 2024-07-31 NOTE — PROGRESS NOTES
Preoperative Evaluation  [facility]  [address]  [address]  [place] [address]  Phone: [phone]  Fax: [phone]  Primary Provider: JOSEFINA FLORES CNP  Pre-op Performing Provider: Brissa Barrera NP  Jul 31, 2024 7/31/2024   Surgical Information   What procedure is being done? plantar fascia and extending   Facility or Hospital where procedure/surgery will be performed: [facility]   Who is doing the procedure / surgery? dr zuniga   Date of surgery / procedure: [id number]   Time of surgery / procedure: 1110   Where do you plan to recover after surgery? at home with family        Fax number for surgical facility: Note does not need to be faxed, will be available electronically in Epic.    Assessment & Plan     The proposed surgical procedure is considered INTERMEDIATE risk.    Pre-op exam  Chronic pain of left ankle  Nontraumatic tear of plantar fascia  Reason for procedure.    Moderate persistent asthma without complication  Stable.    Latent tuberculosis  Per chart review, rifamin 2020. No active s/s.    Morbid obesity (H)  Surgeon aware of current BMI. Plans to start GLP-1 after surgery per PCP.   - Semaglutide-Weight Management (WEGOVY) 0.25 MG/0.5ML pen; Inject 0.25 mg subcutaneously once a week  - Hemoglobin A1c  - TSH with free T4 reflex  - Lipid panel reflex to direct LDL Fasting    Hyperlipidemia, unspecified hyperlipidemia type  Not on statin. Due for labs. Not a contraindication to planned procedure.  The 10-year ASCVD risk score (Ad BOOGIE, et al., 2019) is: 1.2%    Values used to calculate the score:      Age: 53 years      Sex: Female      Is Non- : No      Diabetic: No      Tobacco smoker: No      Systolic Blood Pressure: 111 mmHg      Is BP treated: No      HDL Cholesterol: 62 mg/dL      Total Cholesterol: 235 mg/dL    Special screening for malignant neoplasms, colon  Health care maintenance  Labs  completed for upcoming appointment with PCP, not done for pre-op clearance.  - Fecal colorectal cancer screen (FIT)  - Basic metabolic panel  (Ca, Cl, CO2, Creat, Gluc, K, Na, BUN)    Chronic allergic rhinitis  Stable.     Tonsil stone  None noted on exam. Monitor for recurrence. Pt able to remove per self.    Risks and Recommendations  The patient has the following additional risks and recommendations for perioperative complications:   - Morbid obesity (BMI >40)  -modified Caprini score is high: >6% (Estimated VTE risk in absence of pharmacologic or mechanical prophylaxis)    Antiplatelet or Anticoagulation Medication Instructions   - Patient is on no antiplatelet or anticoagulation medications.    Additional Medication Instructions  Take all scheduled medications on the day of surgery EXCEPT for modifications listed below:  Pt recently prescribed GLP-1 but has not started and instructed by prescriber to wait to start until after surgery.    Recommendation  Approval given to proceed with proposed procedure, without further diagnostic evaluation.      Labs pending/ordered by PCP for routine health maintenance but not done as a part of pre-op clearance visit today.  The longitudinal plan of care for the diagnosis(es)/condition(s) as documented were addressed during this visit. Due to the added complexity in care, I will continue to support Pam in the subsequent management and with ongoing continuity of care along with her PCP.    Subjective   Pam is a 53 year old, presenting for the following:  Pre-Op Exam          7/31/2024     7:47 AM   Additional Questions   Roomed by Mariah LEDESMA   Accompanied by ASHLY         7/31/2024     7:47 AM   Patient Reported Additional Medications   Patient reports taking the following new medications No     HPI related to upcoming procedure: chronic L ankle pain with tearing of the plantar fascia        7/31/2024   Pre-Op Questionnaire   Have you ever had a heart attack or stroke? No    Have you ever had surgery on your heart or blood vessels, such as a stent placement, a coronary artery bypass, or surgery on an artery in your head, neck, heart, or legs? No   Do you have chest pain with activity? No   Do you have a history of heart failure? No   Do you currently have a cold, bronchitis or symptoms of other infection? No   Do you have a cough, shortness of breath, or wheezing? No   Do you or anyone in your family have previous history of blood clots? No   Do you or does anyone in your family have a serious bleeding problem such as prolonged bleeding following surgeries or cuts? No   Have you ever had problems with anemia or been told to take iron pills? No   Have you had any abnormal blood loss such as black, tarry or bloody stools, or abnormal vaginal bleeding? No   Have you ever had a blood transfusion? No   Are you willing to have a blood transfusion if it is medically needed before, during, or after your surgery? Yes   Have you or any of your relatives ever had problems with anesthesia? No   Do you have sleep apnea, excessive snoring or daytime drowsiness? No   Do you have any artifical heart valves or other implanted medical devices like a pacemaker, defibrillator, or continuous glucose monitor? No   Do you have artificial joints? No   Are you allergic to latex? No        Health Care Directive  Patient does not have a Health Care Directive or Living Will: Patient states has Advance Directive and will bring in a copy to clinic.    Preoperative Review of    reviewed - no record of controlled substances prescribed.      Status of Chronic Conditions:  See problem list for active medical problems.  Problems all longstanding and stable, except as noted/documented.  See ROS for pertinent symptoms related to these conditions.    Patient Active Problem List    Diagnosis Date Noted    Hyperlipidemia, unspecified hyperlipidemia type 09/23/2022     Priority: Medium    DUB (dysfunctional uterine  bleeding) 2020     Priority: Medium    Latent tuberculosis 2015     Priority: Medium     Diagnosed 2020. Had BCG as a child. Quant gold positive. States she is sure she has never had treatment for latent TB in the past. Started Rifampin 2020. Will monitor labs monthly until done.     CXR yearly to monitor for active TB      Chronic rhinitis 2015     Priority: Medium    Moderate persistent asthma 2015     Priority: Medium      Past Medical History:   Diagnosis Date    Arthritis     Chronic rhinitis     Heel pain     Latent tuberculosis 2015    chronically postive TB test    Obese     Uncomplicated asthma     moderate persistent asthma     Past Surgical History:   Procedure Laterality Date     SECTION      times 2    CHOLECYSTECTOMY      MAMMOPLASTY REDUCTION BILATERAL Bilateral 2018    Procedure: MAMMOPLASTY REDUCTION BILATERAL;  MAMMOPLASTY REDUCTION BILATERAL ;  Surgeon: Maru Nguyen MD;  Location: [facility]    UPPER GI ENDOSCOPY       Current Outpatient Medications   Medication Sig Dispense Refill    azelastine 137 MCG/SPRAY SOLN Spray 1 spray into both nostrils 2 times daily 90 mL 3    cetirizine (ZYRTEC) 10 MG tablet Take 10 mg by mouth daily      fluticasone (FLONASE) 50 MCG/ACT nasal spray Spray 2 sprays into both nostrils daily 16 g 11    fluticasone-salmeterol (ADVAIR) 500-50 MCG/ACT inhaler Inhale 1 puff into the lungs every 12 hours 1 each 11    loratadine (CLARITIN) 10 MG tablet Take 10 mg by mouth daily      montelukast (SINGULAIR) 10 MG tablet Take 1 tablet (10 mg) by mouth at bedtime 90 tablet 3    Semaglutide-Weight Management (WEGOVY) 0.25 MG/0.5ML pen Inject 0.25 mg subcutaneously once a week      Tetrahydrozoline-Zn Sulfate (EYE DROPS ALLERGY RELIEF OP) Place 1 drop into both eyes daily         No Known Allergies     Social History     Tobacco Use    Smoking status: Never     Passive exposure: Past    Smokeless tobacco: Never  "  Substance Use Topics    Alcohol use: Yes     Comment: 1 time evry 3 months, 1 per time     Family History   Problem Relation Age of Onset    Thyroid Disease Mother     Heart Defect Mother         mitral valve prolapse     History   Drug Use No             Review of Systems  Constitutional, neuro, ENT, endocrine, pulmonary, cardiac, gastrointestinal, genitourinary, musculoskeletal, integument and psychiatric systems are negative, except as otherwise noted.    Objective    /74 (BP Location: Right arm, Patient Position: Sitting, Cuff Size: Adult Large)   Pulse 98   Temp 97.4  F (36.3  C) (Oral)   Ht 1.56 m (5' 1.42\")   Wt 116.5 kg (256 lb 12.8 oz)   LMP  (LMP Unknown)   SpO2 96%   BMI 47.87 kg/m     Estimated body mass index is 47.87 kg/m  as calculated from the following:    Height as of this encounter: 1.56 m (5' 1.42\").    Weight as of this encounter: 116.5 kg (256 lb 12.8 oz).  Physical Exam  GENERAL: alert and no distress  EYES: Eyes grossly normal to inspection, PERRL and conjunctivae and sclerae normal  HENT: normal cephalic/atraumatic, both ears: clear effusion, nose and mouth without ulcers or lesions, oropharynx clear, and oral mucous membranes moist  NECK: no adenopathy, no asymmetry, masses, or scars  RESP: lungs clear to auscultation - no rales, rhonchi or wheezes  CV: regular rate and rhythm, normal S1 S2, no S3 or S4, no murmur, click or rub, +1 trace edema nonpitting, wearing compression sleeve to L ankle  ABDOMEN: obese abdomen, not assessed, no concerns  MS: no gross musculoskeletal defects noted, no edema  SKIN: no suspicious lesions or rashes  NEURO: Normal strength and tone, mentation intact and speech normal  PSYCH: mentation appears normal, affect normal/bright    No results for input(s): \"HGB\", \"PLT\", \"INR\", \"NA\", \"POTASSIUM\", \"CR\", \"A1C\" in the last 8760 hours.     Diagnostics  No labs were ordered during this visit.   No EKG required, no history of coronary heart disease, " significant arrhythmia, peripheral arterial disease or other structural heart disease.    Revised Cardiac Risk Index (RCRI)  The patient has the following serious cardiovascular risks for perioperative complications:   - No serious cardiac risks = 0 points     RCRI Interpretation: 0 points: Class I (very low risk - 0.4% complication rate)         Signed Electronically by: Brissa Barrera NP  A copy of this evaluation report is provided to the requesting physician.

## 2024-08-02 PROCEDURE — 82274 ASSAY TEST FOR BLOOD FECAL: CPT | Performed by: NURSE PRACTITIONER

## 2024-08-05 ENCOUNTER — ANESTHESIA EVENT (OUTPATIENT)
Dept: SURGERY | Facility: CLINIC | Age: 54
End: 2024-08-05
Payer: COMMERCIAL

## 2024-08-05 ENCOUNTER — ANESTHESIA (OUTPATIENT)
Dept: SURGERY | Facility: CLINIC | Age: 54
End: 2024-08-05
Payer: COMMERCIAL

## 2024-08-05 ENCOUNTER — HOSPITAL ENCOUNTER (OUTPATIENT)
Facility: CLINIC | Age: 54
Discharge: HOME | End: 2024-08-05
Attending: PODIATRIST | Admitting: PODIATRIST
Payer: COMMERCIAL

## 2024-08-05 VITALS
HEART RATE: 66 BPM | OXYGEN SATURATION: 97 % | DIASTOLIC BLOOD PRESSURE: 59 MMHG | TEMPERATURE: 97.2 F | RESPIRATION RATE: 16 BRPM | SYSTOLIC BLOOD PRESSURE: 98 MMHG | HEIGHT: 61 IN | BODY MASS INDEX: 48.3 KG/M2 | WEIGHT: 255.8 LBS

## 2024-08-05 DIAGNOSIS — M62.9 NONTRAUMATIC TEAR OF PLANTAR FASCIA: ICD-10-CM

## 2024-08-05 DIAGNOSIS — Z98.890 POST-OPERATIVE STATE: Primary | ICD-10-CM

## 2024-08-05 PROCEDURE — 250N000011 HC RX IP 250 OP 636: Performed by: NURSE ANESTHETIST, CERTIFIED REGISTERED

## 2024-08-05 PROCEDURE — 370N000017 HC ANESTHESIA TECHNICAL FEE, PER MIN: Performed by: PODIATRIST

## 2024-08-05 PROCEDURE — 250N000009 HC RX 250: Performed by: ANESTHESIOLOGY

## 2024-08-05 PROCEDURE — 272N000001 HC OR GENERAL SUPPLY STERILE: Performed by: PODIATRIST

## 2024-08-05 PROCEDURE — 258N000003 HC RX IP 258 OP 636: Performed by: ANESTHESIOLOGY

## 2024-08-05 PROCEDURE — 250N000011 HC RX IP 250 OP 636: Performed by: PODIATRIST

## 2024-08-05 PROCEDURE — 710N000012 HC RECOVERY PHASE 2, PER MINUTE: Performed by: PODIATRIST

## 2024-08-05 PROCEDURE — 27687 REVISION OF CALF TENDON: CPT | Mod: LT | Performed by: PODIATRIST

## 2024-08-05 PROCEDURE — 250N000009 HC RX 250: Performed by: PODIATRIST

## 2024-08-05 PROCEDURE — L4361 PNEUMA/VAC WALK BOOT PRE OTS: HCPCS

## 2024-08-05 PROCEDURE — 360N000076 HC SURGERY LEVEL 3, PER MIN: Performed by: PODIATRIST

## 2024-08-05 PROCEDURE — 710N000009 HC RECOVERY PHASE 1, LEVEL 1, PER MIN: Performed by: PODIATRIST

## 2024-08-05 PROCEDURE — 258N000003 HC RX IP 258 OP 636: Performed by: NURSE ANESTHETIST, CERTIFIED REGISTERED

## 2024-08-05 PROCEDURE — 250N000013 HC RX MED GY IP 250 OP 250 PS 637: Performed by: ANESTHESIOLOGY

## 2024-08-05 PROCEDURE — 250N000011 HC RX IP 250 OP 636: Performed by: ANESTHESIOLOGY

## 2024-08-05 PROCEDURE — 250N000009 HC RX 250: Performed by: NURSE ANESTHETIST, CERTIFIED REGISTERED

## 2024-08-05 PROCEDURE — 999N000141 HC STATISTIC PRE-PROCEDURE NURSING ASSESSMENT: Performed by: PODIATRIST

## 2024-08-05 PROCEDURE — 250N000025 HC SEVOFLURANE, PER MIN: Performed by: PODIATRIST

## 2024-08-05 PROCEDURE — 28008 INCISION OF FOOT FASCIA: CPT | Mod: 51 | Performed by: PODIATRIST

## 2024-08-05 RX ORDER — MAGNESIUM HYDROXIDE 1200 MG/15ML
LIQUID ORAL PRN
Status: DISCONTINUED | OUTPATIENT
Start: 2024-08-05 | End: 2024-08-05 | Stop reason: HOSPADM

## 2024-08-05 RX ORDER — HYDRALAZINE HYDROCHLORIDE 20 MG/ML
2.5-5 INJECTION INTRAMUSCULAR; INTRAVENOUS EVERY 10 MIN PRN
Status: DISCONTINUED | OUTPATIENT
Start: 2024-08-05 | End: 2024-08-05 | Stop reason: HOSPADM

## 2024-08-05 RX ORDER — HYDROMORPHONE HCL IN WATER/PF 6 MG/30 ML
0.2 PATIENT CONTROLLED ANALGESIA SYRINGE INTRAVENOUS EVERY 5 MIN PRN
Status: DISCONTINUED | OUTPATIENT
Start: 2024-08-05 | End: 2024-08-05 | Stop reason: HOSPADM

## 2024-08-05 RX ORDER — ONDANSETRON 4 MG/1
4 TABLET, ORALLY DISINTEGRATING ORAL EVERY 30 MIN PRN
Status: DISCONTINUED | OUTPATIENT
Start: 2024-08-05 | End: 2024-08-05 | Stop reason: HOSPADM

## 2024-08-05 RX ORDER — FENTANYL CITRATE 50 UG/ML
50 INJECTION, SOLUTION INTRAMUSCULAR; INTRAVENOUS EVERY 5 MIN PRN
Status: DISCONTINUED | OUTPATIENT
Start: 2024-08-05 | End: 2024-08-05 | Stop reason: HOSPADM

## 2024-08-05 RX ORDER — SODIUM CHLORIDE, SODIUM LACTATE, POTASSIUM CHLORIDE, CALCIUM CHLORIDE 600; 310; 30; 20 MG/100ML; MG/100ML; MG/100ML; MG/100ML
INJECTION, SOLUTION INTRAVENOUS CONTINUOUS
Status: DISCONTINUED | OUTPATIENT
Start: 2024-08-05 | End: 2024-08-05 | Stop reason: HOSPADM

## 2024-08-05 RX ORDER — OXYCODONE HYDROCHLORIDE 5 MG/1
5-10 TABLET ORAL EVERY 4 HOURS PRN
Qty: 30 TABLET | Refills: 0 | Status: SHIPPED | OUTPATIENT
Start: 2024-08-05 | End: 2024-09-06

## 2024-08-05 RX ORDER — AMOXICILLIN 250 MG
1-2 CAPSULE ORAL 2 TIMES DAILY
Qty: 30 TABLET | Refills: 0 | Status: SHIPPED | OUTPATIENT
Start: 2024-08-05 | End: 2024-09-06

## 2024-08-05 RX ORDER — DEXAMETHASONE SODIUM PHOSPHATE 4 MG/ML
4 INJECTION, SOLUTION INTRA-ARTICULAR; INTRALESIONAL; INTRAMUSCULAR; INTRAVENOUS; SOFT TISSUE
Status: DISCONTINUED | OUTPATIENT
Start: 2024-08-05 | End: 2024-08-05 | Stop reason: HOSPADM

## 2024-08-05 RX ORDER — FENTANYL CITRATE 50 UG/ML
INJECTION, SOLUTION INTRAMUSCULAR; INTRAVENOUS PRN
Status: DISCONTINUED | OUTPATIENT
Start: 2024-08-05 | End: 2024-08-05

## 2024-08-05 RX ORDER — DEXAMETHASONE SODIUM PHOSPHATE 4 MG/ML
INJECTION, SOLUTION INTRA-ARTICULAR; INTRALESIONAL; INTRAMUSCULAR; INTRAVENOUS; SOFT TISSUE PRN
Status: DISCONTINUED | OUTPATIENT
Start: 2024-08-05 | End: 2024-08-05

## 2024-08-05 RX ORDER — CEFAZOLIN SODIUM/WATER 2 G/20 ML
2 SYRINGE (ML) INTRAVENOUS SEE ADMIN INSTRUCTIONS
Status: DISCONTINUED | OUTPATIENT
Start: 2024-08-05 | End: 2024-08-05 | Stop reason: HOSPADM

## 2024-08-05 RX ORDER — PROPOFOL 10 MG/ML
INJECTION, EMULSION INTRAVENOUS PRN
Status: DISCONTINUED | OUTPATIENT
Start: 2024-08-05 | End: 2024-08-05

## 2024-08-05 RX ORDER — NALOXONE HYDROCHLORIDE 0.4 MG/ML
0.1 INJECTION, SOLUTION INTRAMUSCULAR; INTRAVENOUS; SUBCUTANEOUS
Status: DISCONTINUED | OUTPATIENT
Start: 2024-08-05 | End: 2024-08-05 | Stop reason: HOSPADM

## 2024-08-05 RX ORDER — ONDANSETRON 2 MG/ML
4 INJECTION INTRAMUSCULAR; INTRAVENOUS EVERY 30 MIN PRN
Status: DISCONTINUED | OUTPATIENT
Start: 2024-08-05 | End: 2024-08-05 | Stop reason: HOSPADM

## 2024-08-05 RX ORDER — PROPOFOL 10 MG/ML
INJECTION, EMULSION INTRAVENOUS CONTINUOUS PRN
Status: DISCONTINUED | OUTPATIENT
Start: 2024-08-05 | End: 2024-08-05

## 2024-08-05 RX ORDER — OXYCODONE HYDROCHLORIDE 5 MG/1
5 TABLET ORAL
Status: COMPLETED | OUTPATIENT
Start: 2024-08-05 | End: 2024-08-05

## 2024-08-05 RX ORDER — OXYCODONE HYDROCHLORIDE 5 MG/1
5 TABLET ORAL
Status: DISCONTINUED | OUTPATIENT
Start: 2024-08-05 | End: 2024-08-05 | Stop reason: HOSPADM

## 2024-08-05 RX ORDER — OXYCODONE HYDROCHLORIDE 5 MG/1
10 TABLET ORAL
Status: DISCONTINUED | OUTPATIENT
Start: 2024-08-05 | End: 2024-08-05 | Stop reason: HOSPADM

## 2024-08-05 RX ORDER — ONDANSETRON 4 MG/1
4 TABLET, ORALLY DISINTEGRATING ORAL EVERY 8 HOURS PRN
Qty: 4 TABLET | Refills: 0 | Status: SHIPPED | OUTPATIENT
Start: 2024-08-05 | End: 2024-09-06

## 2024-08-05 RX ORDER — ALBUTEROL SULFATE 0.83 MG/ML
2.5 SOLUTION RESPIRATORY (INHALATION) EVERY 4 HOURS PRN
Status: DISCONTINUED | OUTPATIENT
Start: 2024-08-05 | End: 2024-08-05 | Stop reason: HOSPADM

## 2024-08-05 RX ORDER — LABETALOL HYDROCHLORIDE 5 MG/ML
10 INJECTION, SOLUTION INTRAVENOUS
Status: DISCONTINUED | OUTPATIENT
Start: 2024-08-05 | End: 2024-08-05 | Stop reason: HOSPADM

## 2024-08-05 RX ORDER — SODIUM CHLORIDE, SODIUM LACTATE, POTASSIUM CHLORIDE, CALCIUM CHLORIDE 600; 310; 30; 20 MG/100ML; MG/100ML; MG/100ML; MG/100ML
INJECTION, SOLUTION INTRAVENOUS CONTINUOUS PRN
Status: DISCONTINUED | OUTPATIENT
Start: 2024-08-05 | End: 2024-08-05

## 2024-08-05 RX ORDER — FENTANYL CITRATE 50 UG/ML
25 INJECTION, SOLUTION INTRAMUSCULAR; INTRAVENOUS EVERY 5 MIN PRN
Status: DISCONTINUED | OUTPATIENT
Start: 2024-08-05 | End: 2024-08-05 | Stop reason: HOSPADM

## 2024-08-05 RX ORDER — CEFAZOLIN SODIUM/WATER 2 G/20 ML
2 SYRINGE (ML) INTRAVENOUS
Status: COMPLETED | OUTPATIENT
Start: 2024-08-05 | End: 2024-08-05

## 2024-08-05 RX ORDER — ONDANSETRON 2 MG/ML
INJECTION INTRAMUSCULAR; INTRAVENOUS PRN
Status: DISCONTINUED | OUTPATIENT
Start: 2024-08-05 | End: 2024-08-05

## 2024-08-05 RX ORDER — HYDROMORPHONE HCL IN WATER/PF 6 MG/30 ML
0.4 PATIENT CONTROLLED ANALGESIA SYRINGE INTRAVENOUS EVERY 5 MIN PRN
Status: DISCONTINUED | OUTPATIENT
Start: 2024-08-05 | End: 2024-08-05 | Stop reason: HOSPADM

## 2024-08-05 RX ORDER — BUPIVACAINE HYDROCHLORIDE AND EPINEPHRINE 5; 5 MG/ML; UG/ML
INJECTION, SOLUTION PERINEURAL
Status: COMPLETED | OUTPATIENT
Start: 2024-08-05 | End: 2024-08-05

## 2024-08-05 RX ORDER — KETOROLAC TROMETHAMINE 30 MG/ML
INJECTION, SOLUTION INTRAMUSCULAR; INTRAVENOUS PRN
Status: DISCONTINUED | OUTPATIENT
Start: 2024-08-05 | End: 2024-08-05

## 2024-08-05 RX ORDER — LIDOCAINE 40 MG/G
CREAM TOPICAL
Status: DISCONTINUED | OUTPATIENT
Start: 2024-08-05 | End: 2024-08-05 | Stop reason: HOSPADM

## 2024-08-05 RX ORDER — LIDOCAINE HYDROCHLORIDE 20 MG/ML
INJECTION, SOLUTION INFILTRATION; PERINEURAL PRN
Status: DISCONTINUED | OUTPATIENT
Start: 2024-08-05 | End: 2024-08-05

## 2024-08-05 RX ADMIN — ONDANSETRON 4 MG: 2 INJECTION INTRAMUSCULAR; INTRAVENOUS at 12:48

## 2024-08-05 RX ADMIN — SODIUM CHLORIDE, POTASSIUM CHLORIDE, SODIUM LACTATE AND CALCIUM CHLORIDE: 600; 310; 30; 20 INJECTION, SOLUTION INTRAVENOUS at 10:41

## 2024-08-05 RX ADMIN — OXYCODONE HYDROCHLORIDE 5 MG: 5 TABLET ORAL at 14:37

## 2024-08-05 RX ADMIN — BUPIVACAINE HYDROCHLORIDE AND EPINEPHRINE 30 ML: 5; 5 INJECTION, SOLUTION PERINEURAL at 11:42

## 2024-08-05 RX ADMIN — FENTANYL CITRATE 100 MCG: 50 INJECTION INTRAMUSCULAR; INTRAVENOUS at 13:14

## 2024-08-05 RX ADMIN — HYDROMORPHONE HYDROCHLORIDE 1 MG: 1 INJECTION, SOLUTION INTRAMUSCULAR; INTRAVENOUS; SUBCUTANEOUS at 13:17

## 2024-08-05 RX ADMIN — FENTANYL CITRATE 100 MCG: 50 INJECTION INTRAMUSCULAR; INTRAVENOUS at 12:12

## 2024-08-05 RX ADMIN — LIDOCAINE HYDROCHLORIDE 50 MG: 20 INJECTION, SOLUTION INFILTRATION; PERINEURAL at 12:12

## 2024-08-05 RX ADMIN — KETOROLAC TROMETHAMINE 30 MG: 30 INJECTION, SOLUTION INTRAMUSCULAR at 13:00

## 2024-08-05 RX ADMIN — ONDANSETRON 4 MG: 2 INJECTION INTRAMUSCULAR; INTRAVENOUS at 16:20

## 2024-08-05 RX ADMIN — MIDAZOLAM 2 MG: 1 INJECTION INTRAMUSCULAR; INTRAVENOUS at 11:42

## 2024-08-05 RX ADMIN — PHENYLEPHRINE HYDROCHLORIDE 100 MCG: 10 INJECTION INTRAVENOUS at 12:22

## 2024-08-05 RX ADMIN — PROPOFOL 200 MG: 10 INJECTION, EMULSION INTRAVENOUS at 12:12

## 2024-08-05 RX ADMIN — PROPOFOL 75 MCG/KG/MIN: 10 INJECTION, EMULSION INTRAVENOUS at 12:25

## 2024-08-05 RX ADMIN — BUPIVACAINE HYDROCHLORIDE AND EPINEPHRINE BITARTRATE 10 ML: 5; .005 INJECTION, SOLUTION PERINEURAL at 11:40

## 2024-08-05 RX ADMIN — Medication 2 G: at 12:06

## 2024-08-05 RX ADMIN — FENTANYL CITRATE 50 MCG: 50 INJECTION, SOLUTION INTRAMUSCULAR; INTRAVENOUS at 13:36

## 2024-08-05 RX ADMIN — PHENYLEPHRINE HYDROCHLORIDE 100 MCG: 10 INJECTION INTRAVENOUS at 12:19

## 2024-08-05 RX ADMIN — SODIUM CHLORIDE, POTASSIUM CHLORIDE, SODIUM LACTATE AND CALCIUM CHLORIDE: 600; 310; 30; 20 INJECTION, SOLUTION INTRAVENOUS at 12:06

## 2024-08-05 RX ADMIN — FENTANYL CITRATE 50 MCG: 50 INJECTION, SOLUTION INTRAMUSCULAR; INTRAVENOUS at 13:57

## 2024-08-05 RX ADMIN — DEXAMETHASONE SODIUM PHOSPHATE 8 MG: 4 INJECTION, SOLUTION INTRA-ARTICULAR; INTRALESIONAL; INTRAMUSCULAR; INTRAVENOUS; SOFT TISSUE at 12:12

## 2024-08-05 ASSESSMENT — ACTIVITIES OF DAILY LIVING (ADL)
ADLS_ACUITY_SCORE: 32

## 2024-08-05 NOTE — ANESTHESIA PROCEDURE NOTES
"Adductor canal Procedure Note    Pre-Procedure   Staff -        Anesthesiologist:  Candy Preciado MD       Performed By: anesthesiologist       Location: pre-op       Procedure Start/Stop Times: 8/5/2024 11:40 AM and 8/5/2024 11:41 AM       Pre-Anesthestic Checklist: patient identified, IV checked, site marked, risks and benefits discussed, informed consent, monitors and equipment checked, pre-op evaluation, at physician/surgeon's request and post-op pain management  Timeout:       Correct Patient: Yes        Correct Procedure: Yes        Correct Site: Yes        Correct Position: Yes        Correct Laterality: Yes        Site Marked: Yes  Procedure Documentation  Procedure: Adductor canal       Laterality: left       Patient Position: supine       Skin prep: Chloraprep       Needle Type: short bevel       Needle Gauge: 20.        Needle Length (Inches): 4        Ultrasound guided       1. Ultrasound was used to identify targeted nerve, plexus, vascular marker, or fascial plane and place a needle adjacent to it in real-time.       2. Ultrasound was used to visualize the spread of anesthetic in close proximity to the above referenced structure.       4. The visualized anatomic structures appeared normal.       5. There were no apparent abnormal pathologic findings.    Assessment/Narrative         The placement was negative for: blood aspirated and painful injection       Paresthesias: No.       Bolus given via needle..        Secured via.        Insertion/Infusion Method: Single Shot       Complications: none       Injection made incrementally with aspirations every 5 mL.    Medication(s) Administered   Bupivacaine 0.5% w/ 1:200K Epi (Other) - Other   10 mL - 8/5/2024 11:40:00 AM  Medication Administration Time: 8/5/2024 11:40 AM      FOR [facility] ([facility]) ONLY:   Pain Team Contact information: please page the Pain Team Via [employer]. Search \"Pain\". During daytime hours, please page the attending first. At night " please page the resident first.

## 2024-08-05 NOTE — ANESTHESIA POSTPROCEDURE EVALUATION
Patient: Pam Gaviria    Procedure: Procedure(s):  Left plantar fascial release  Left Achilles tendon lengthening       Anesthesia Type:  General    Note:  Disposition: Outpatient   Postop Pain Control: Uneventful            Sign Out: Well controlled pain   PONV: No   Neuro/Psych: Uneventful            Sign Out: Acceptable/Baseline neuro status   Airway/Respiratory: Uneventful            Sign Out: Acceptable/Baseline resp. status   CV/Hemodynamics: Uneventful            Sign Out: Acceptable CV status; No obvious hypovolemia; No obvious fluid overload   Other NRE:    DID A NON-ROUTINE EVENT OCCUR? No           Last vitals:  Vitals Value Taken Time   BP 96/51 08/05/24 1440   Temp 97.4  F (36.3  C) 08/05/24 1437   Pulse 70 08/05/24 1442   Resp 10 08/05/24 1442   SpO2 94 % 08/05/24 1455   Vitals shown include unfiled device data.    Electronically Signed By: Candy Preciado MD  August 5, 2024  3:12 PM

## 2024-08-05 NOTE — DISCHARGE INSTRUCTIONS
Maximum acetaminophen (Tylenol) dose from all sources should not exceed 4 grams (4000 mg) per day.    You received Toradol, an IV form of Ibuprofen (Motrin) at 1 PM.  Do not take any Ibuprofen products until 7 PM.   Maximun Ibuprofen/Motrin in 24 hours = 2,400 mg.      DR. ZELDA ECHOLS, DPM    Podiatry Triage Phone Number:  [phone], choose option #1 and then option #1 for orthopedic scheduling. They will be able to connect you to the care team.    Patient Care Nurses On Call (After Hours):  [phone]

## 2024-08-05 NOTE — BRIEF OP NOTE
[facility]    Brief Operative Note    Pre-operative diagnosis: Chronic pain of left ankle [M25.572, G89.29]  Nontraumatic tear of plantar fascia [M62.9]  Peroneal tendonitis, left [M76.72]  Arthritis of left foot [M19.072]  Post-operative diagnosis Same as pre-operative diagnosis    Procedure: Left plantar fascial release, Left - Foot  Left Achilles tendon lengthening, Left - Ankle    Surgeon: Surgeons and Role:     * Linnette Garcia DPM, Podiatry/Foot and Ankle Surgery - Primary  Anesthesia: General with Block   Estimated Blood Loss: Less than 10 ml    Drains: None  Specimens: * No specimens in log *  Findings:   None.  Complications: None.  Implants: * No implants in log *

## 2024-08-05 NOTE — ANESTHESIA PROCEDURE NOTES
Sciatic Procedure Note    Pre-Procedure   Staff -        Anesthesiologist:  Candy Preciado MD       Performed By: anesthesiologist       Location: pre-op       Procedure Start/Stop Times: 8/5/2024 11:42 AM and 8/5/2024 11:46 AM       Pre-Anesthestic Checklist: patient identified, IV checked, site marked, risks and benefits discussed, informed consent, monitors and equipment checked, pre-op evaluation, at physician/surgeon's request and post-op pain management  Timeout:       Correct Patient: Yes        Correct Procedure: Yes        Correct Site: Yes        Correct Position: Yes        Correct Laterality: Yes        Site Marked: Yes  Procedure Documentation  Procedure: Sciatic       Laterality: left       Patient Position: supine       Skin prep: Chloraprep (popliteal approach).       Needle Type: short bevel       Needle Gauge: 20.        Needle Length (Inches): 4        Ultrasound guided       1. Ultrasound was used to identify targeted nerve, plexus, vascular marker, or fascial plane and place a needle adjacent to it in real-time.       2. Ultrasound was used to visualize the spread of anesthetic in close proximity to the above referenced structure.       4. The visualized anatomic structures appeared normal.       5. There were no apparent abnormal pathologic findings.    Assessment/Narrative         The placement was negative for: blood aspirated, painful injection and site bleeding       Bolus given via needle..        Secured via.        Insertion/Infusion Method: Single Shot       Complications: none       Injection made incrementally with aspirations every 5 mL.    Medication(s) Administered   Bupivacaine 0.5% w/ 1:200K Epi (Other) - Other   30 mL - 8/5/2024 11:42:00 AM  midazolam (VERSED) injection - Intravenous   2 mg - 8/5/2024 11:42:00 AM  Medication Administration Time: 8/5/2024 11:42 AM      FOR [facility] ([facility]) ONLY:   Pain Team Contact information: please page the Pain Team Via [employer]. Search  "\"Pain\". During daytime hours, please page the attending first. At night please page the resident first.      "

## 2024-08-05 NOTE — ANESTHESIA PREPROCEDURE EVALUATION
Anesthesia Pre-Procedure Evaluation    Patient: Pam Gaviria   MRN: [id number] : 1970        Procedure : Procedure(s):  Left plantar fascial release  Left Achilles tendon lengthening          Past Medical History:   Diagnosis Date    Arthritis     Chronic rhinitis     Heel pain     Latent tuberculosis 2015    chronically postive TB test    Obese     Uncomplicated asthma     moderate persistent asthma      Past Surgical History:   Procedure Laterality Date     SECTION      times 2    CHOLECYSTECTOMY      MAMMOPLASTY REDUCTION BILATERAL Bilateral 2018    Procedure: MAMMOPLASTY REDUCTION BILATERAL;  MAMMOPLASTY REDUCTION BILATERAL ;  Surgeon: Maru Nguyen MD;  Location: [facility]    UPPER GI ENDOSCOPY        No Known Allergies   Social History     Tobacco Use    Smoking status: Never     Passive exposure: Past    Smokeless tobacco: Never   Substance Use Topics    Alcohol use: Yes     Comment: 1 time evry 3 months, 1 per time      Wt Readings from Last 1 Encounters:   24 116 kg (255 lb 12.8 oz)        Anesthesia Evaluation            ROS/MED HX  ENT/Pulmonary:     (+)                     Moderate Persistent, asthma                  Neurologic:       Cardiovascular:     (+) Dyslipidemia - -   -  - -                                      METS/Exercise Tolerance:     Hematologic:       Musculoskeletal:       GI/Hepatic:       Renal/Genitourinary:       Endo:     (+)               Obesity (BMI 47),       Psychiatric/Substance Use:       Infectious Disease:       Malignancy:       Other:            Physical Exam    Airway        Mallampati: II   TM distance: > 3 FB   Neck ROM: full   Mouth opening: > 3 cm    Respiratory Devices and Support         Dental           Cardiovascular   cardiovascular exam normal          Pulmonary   pulmonary exam normal                OUTSIDE LABS:  CBC:   Lab Results   Component Value Date    WBC 6.5 2020    WBC 7.2 10/23/2020    HGB 13.5  "11/13/2020    HGB 12.9 10/23/2020    HCT 41.7 11/13/2020    HCT 40.4 10/23/2020     11/13/2020     10/23/2020     BMP:   Lab Results   Component Value Date     07/31/2024     09/23/2022    POTASSIUM 4.5 07/31/2024    POTASSIUM 4.9 09/23/2022    CHLORIDE 104 07/31/2024    CHLORIDE 108 09/23/2022    CO2 25 07/31/2024    CO2 29 09/23/2022    BUN 13.2 07/31/2024    BUN 16 09/23/2022    CR 1.00 (H) 07/31/2024    CR 0.88 09/23/2022    GLC 83 07/31/2024    GLC 94 09/23/2022     COAGS: No results found for: \"PTT\", \"INR\", \"FIBR\"  POC:   Lab Results   Component Value Date    HCG Negative 06/06/2018     HEPATIC:   Lab Results   Component Value Date    ALBUMIN 3.8 09/23/2022    PROTTOTAL 7.6 09/23/2022    ALT 24 09/23/2022    AST 20 09/23/2022    ALKPHOS 78 09/23/2022    BILITOTAL 0.5 09/23/2022     OTHER:   Lab Results   Component Value Date    A1C 5.3 07/31/2024    MARIA INES 9.4 07/31/2024    TSH 3.92 07/31/2024       Anesthesia Plan    ASA Status:  3    NPO Status:  NPO Appropriate    Anesthesia Type: General.     - Airway: LMA   Induction: Intravenous.   Maintenance: Balanced.        Consents    Anesthesia Plan(s) and associated risks, benefits, and realistic alternatives discussed. Questions answered and patient/representative(s) expressed understanding.     - Discussed:     - Discussed with:  Patient            Postoperative Care    Pain management: IV analgesics, Oral pain medications, Peripheral nerve block (Single Shot), Multi-modal analgesia.   PONV prophylaxis: Ondansetron (or other 5HT-3), Dexamethasone or Solumedrol     Comments:               Candy Preciado MD    I have reviewed the pertinent notes and labs in the chart from the past 30 days and (re)examined the patient.  Any updates or changes from those notes are reflected in this note.              # Severe Obesity: Estimated body mass index is 47.68 kg/m  as calculated from the following:    Height as of this encounter: 1.56 m (5' " "1.42\").    Weight as of this encounter: 116 kg (255 lb 12.8 oz).      "

## 2024-08-05 NOTE — OP NOTE
[facility] Podiatry Operative Note    Patient Name:                           Pam Gaviria  Patient YOB: 1970  Date of Surgery:                       8/5/2024  CSN:                                         [id number]    Pre-operative diagnosis: Chronic pain of left ankle [M25.572, G89.29]  Nontraumatic tear of plantar fascia [M62.9]  Peroneal tendonitis, left [M76.72]  Arthritis of left foot [M19.072]   Post-operative diagnosis: Same   Procedure: 1.  Gastrocnemius recession left ankle  2.  Plantar fasciotomy left foot   Surgeon: Linnette Garcia DPM, Podiatry/Foot and Ankle Surgery   Assistant(s): None   Anesthesia: General with popliteal block.     Hemostasis:                             Thigh tourniquet with electrocautery    Estimated Blood Loss:              10 mL    Speceimens:                            None    Materials:                                  None      Indications: Ms. Gaviria is a 53-year-old female that presented to clinic with pain to the left heel and back of the calf.  Noted that I think been going on for quite a while.  On physical exam patient's pulses were palpable.  She had pain on the plantar medial aspect of the left heel as well as decreased dorsiflexion noted and pain up along the Achilles tendon.  MRI showed a plantar fascial tear and it was discussed with patient to go in and complete the tear surgically as well as to lengthen the Achilles tendon with a gastrocnemius recession to try to decrease tension on this area and try to help with the pain.  Risk benefits and complications were discussed with patient no guarantees were made.  She wishes to proceed with surgery      Procecure: Patient was brought to the operating room placed operating table supine position.  Anesthesia was administered and local was injected.  The foot was prepped and draped using sterile technique.  Tourniquet was inflated to 275 mmHg and attention was directed to the medial  aspect of the mid calf on the left leg.  A linear incision approximately 2 cm was made through skin with a #15 blade.  Blunt dissection was used down to the level of the peritenon.  Once noted this was incised and the gastroc aponeurosis was identified and bluntly dissected out.  This was then transected with the foot in a dorsiflexed position.  The dorsiflexion was noted to increase after the gastroc aponeurosis was transected.  The wound was flushed copious amounts of normal saline and the peritenon was reapproximated using 4-0 Vicryl and the skin was reapproximated using 4-0 Prolene.    Procedure #2.  Attention was directed to the plantar medial aspect of the left heel.  An oblique incision was made just distal to the heel and base of the heel with a #15 blade through skin approximately 3 cm in length.  Blunt dissection was used down to the level of the deep fascia.  All vessels that were noted were ligated with electrocautery.  Once the deep fascia was noted this was incised.  The plantar fascia was then noted to be thickened medially.  This was bluntly dissected out and transected all the way across with the Metzenbaum scissors in the foot in a dorsiflexed position.  The left great toe was noted to increase in dorsiflexion after this was cut.  The tourniquet was let down at this time and bleeding was controlled with electrocautery.  No pulsatile bleeding was noted.  The skin was reapproximated using 4-0 Prolene.  Patient's foot was placed in dry sterile dressing and patient was transferred to recovery with vital signs stable and vascular status intact.    Linnette Garcia DPM

## 2024-08-05 NOTE — PROGRESS NOTES
Fit patient with rodneyt.  I instructed patient on donning and doffing. Patient to follow as needed.  Eusebio Woods.

## 2024-08-05 NOTE — ANESTHESIA PROCEDURE NOTES
Airway       Patient location during procedure: OR       Procedure Start/Stop Times: 8/5/2024 12:16 PM  Staff -        CRNA: Rashida Serna APRN CRNA       Performed By: CRNA  Consent for Airway        Urgency: elective  Indications and Patient Condition       Indications for airway management: keira-procedural       Induction type:intravenous       Mask difficulty assessment: 1 - vent by mask    Final Airway Details       Final airway type: supraglottic airway    Supraglottic Airway Details        Type: LMA       Brand: I-Gel       LMA size: 4    Post intubation assessment        Placement verified by: capnometry        Number of attempts at approach: 1       Secured with: commercial tube torrez       Ease of procedure: easy       Dentition: Intact and Unchanged    Medication(s) Administered   Medication Administration Time: 8/5/2024 12:16 PM

## 2024-08-05 NOTE — ANESTHESIA CARE TRANSFER NOTE
Patient: Pam Gaviria    Procedure: Procedure(s):  Left plantar fascial release  Left Achilles tendon lengthening       Diagnosis: Chronic pain of left ankle [M25.572, G89.29]  Nontraumatic tear of plantar fascia [M62.9]  Peroneal tendonitis, left [M76.72]  Arthritis of left foot [M19.072]  Diagnosis Additional Information: No value filed.    Anesthesia Type:   General     Note:    Oropharynx: oropharynx clear of all foreign objects  Level of Consciousness: awake  Oxygen Supplementation: nasal cannula  Level of Supplemental Oxygen (L/min / FiO2): 3  Independent Airway: airway patency satisfactory and stable  Dentition: dentition unchanged  Vital Signs Stable: post-procedure vital signs reviewed and stable  Report to RN Given: handoff report given  Patient transferred to: PACU    Handoff Report: Identifed the Patient, Identified the Reponsible Provider, Reviewed the pertinent medical history, Discussed the surgical course, Reviewed Intra-OP anesthesia mangement and issues during anesthesia, Set expectations for post-procedure period and Allowed opportunity for questions and acknowledgement of understanding      Vitals:  Vitals Value Taken Time   /73 08/05/24 1311   Temp     Pulse 92 08/05/24 1316   Resp 12 08/05/24 1316   SpO2 98 % 08/05/24 1316   Vitals shown include unfiled device data.    Electronically Signed By: JOSEFINA Odom CRNA  August 5, 2024  1:17 PM

## 2024-08-06 LAB — HEMOCCULT STL QL IA: NEGATIVE

## 2024-08-13 ENCOUNTER — OFFICE VISIT (OUTPATIENT)
Dept: PODIATRY | Facility: CLINIC | Age: 54
End: 2024-08-13
Payer: COMMERCIAL

## 2024-08-13 VITALS — WEIGHT: 256 LBS | BODY MASS INDEX: 47.72 KG/M2 | DIASTOLIC BLOOD PRESSURE: 60 MMHG | SYSTOLIC BLOOD PRESSURE: 106 MMHG

## 2024-08-13 DIAGNOSIS — Z98.890 POST-OPERATIVE STATE: Primary | ICD-10-CM

## 2024-08-13 PROCEDURE — 99024 POSTOP FOLLOW-UP VISIT: CPT | Performed by: PODIATRIST

## 2024-08-13 NOTE — LETTER
8/13/2024      Pam Gaviria  [address]  [place] [address]      Dear Colleague,    Thank you for referring your patient, Pam Gaviria, to the [facility] POD[facility]. Please see a copy of my visit note below.    Podiatry / Foot and Ankle Surgery Progress Note    August 13, 2024    Subject: Patient was seen for 1 week status post plantar fasciotomy and gastroc anemias lengthening left foot..  Notes pain has been okay.  She is just been taking ibuprofen or Tylenol.  Is not taking the oxycodone anymore.  Here with her mom.    Objective:  Vitals: /60   Wt 116.1 kg (256 lb)   LMP  (LMP Unknown)   BMI 47.72 kg/m    BMI= Body mass index is 47.72 kg/m .    General:  Patient is alert and orientated.  NAD.    Vascular:  DP and PT pulses are palpable.  No edema or varicosities noted.  CFT's < 3secs.  Skin temp is normal.    Neuro:  Light and gross touch sensation intact to digits, dorsum, and plantar aspects of the feet.    Derm: Dressing is clean dry and intact.  Sutures are intact.  No redness, dehiscence or signs of acute infection noted.    Musculoskeletal:  No foot deformity noted.      Assessment: Post-operative state    Medical Decision Making/Plan: At this time the dressing was changed.  Patient will continue to keep foot dressing dry.  She will continue to be nonweightbearing.  She will follow-up in 1 week for suture removal.     All questions were answered to patient satisfaction and she will call further questions or concerns.      Patient Risk Factor:  Patient is a low risk factor for infection.     Linnette Garcia DPM, Podiatry/Foot and Ankle Surgery      Again, thank you for allowing me to participate in the care of your patient.        Sincerely,        Linnette Garcia DPM, Podiatry/Foot and Ankle Surgery

## 2024-08-13 NOTE — PROGRESS NOTES
Podiatry / Foot and Ankle Surgery Progress Note    August 13, 2024    Subject: Patient was seen for 1 week status post plantar fasciotomy and gastroc anemias lengthening left foot..  Notes pain has been okay.  She is just been taking ibuprofen or Tylenol.  Is not taking the oxycodone anymore.  Here with her mom.    Objective:  Vitals: /60   Wt 116.1 kg (256 lb)   LMP  (LMP Unknown)   BMI 47.72 kg/m    BMI= Body mass index is 47.72 kg/m .    General:  Patient is alert and orientated.  NAD.    Vascular:  DP and PT pulses are palpable.  No edema or varicosities noted.  CFT's < 3secs.  Skin temp is normal.    Neuro:  Light and gross touch sensation intact to digits, dorsum, and plantar aspects of the feet.    Derm: Dressing is clean dry and intact.  Sutures are intact.  No redness, dehiscence or signs of acute infection noted.    Musculoskeletal:  No foot deformity noted.      Assessment: Post-operative state    Medical Decision Making/Plan: At this time the dressing was changed.  Patient will continue to keep foot dressing dry.  She will continue to be nonweightbearing.  She will follow-up in 1 week for suture removal.     All questions were answered to patient satisfaction and she will call further questions or concerns.      Patient Risk Factor:  Patient is a low risk factor for infection.     Linnette Garcia DPM, Podiatry/Foot and Ankle Surgery

## 2024-08-14 ENCOUNTER — DOCUMENTATION ONLY (OUTPATIENT)
Dept: OTHER | Facility: CLINIC | Age: 54
End: 2024-08-14
Payer: COMMERCIAL

## 2024-08-20 ENCOUNTER — OFFICE VISIT (OUTPATIENT)
Dept: PODIATRY | Facility: CLINIC | Age: 54
End: 2024-08-20
Payer: COMMERCIAL

## 2024-08-20 VITALS — BODY MASS INDEX: 47.72 KG/M2 | DIASTOLIC BLOOD PRESSURE: 62 MMHG | WEIGHT: 256 LBS | SYSTOLIC BLOOD PRESSURE: 110 MMHG

## 2024-08-20 DIAGNOSIS — Z98.890 POST-OPERATIVE STATE: Primary | ICD-10-CM

## 2024-08-20 PROCEDURE — 99024 POSTOP FOLLOW-UP VISIT: CPT | Performed by: PODIATRIST

## 2024-08-20 NOTE — LETTER
8/20/2024      Pam Gaviria  [address]  [place] [address]      Dear Colleague,    Thank you for referring your patient, Pam Gaviria, to the [facility] PODI[facility]RY. Please see a copy of my visit note below.    Podiatry / Foot and Ankle Surgery Progress Note    August 20, 2024    Subject: Patient was seen for 2 week status post plantar fasciotomy and gastroc anemias lengthening left foot.. Notes pain has been okay. She is just been taking ibuprofen or Tylenol. Is not taking the oxycodone anymore. Here with her mom.     Objective:  Vitals: Wt 116.1 kg (256 lb)   LMP  (LMP Unknown)   BMI 47.72 kg/m    BMI= Body mass index is 47.72 kg/m .    General:  Patient is alert and orientated.  NAD.    Vascular:  DP and PT pulses are palpable.  No edema or varicosities noted.  CFT's < 3secs.  Skin temp is normal.     Neuro:  Light and gross touch sensation intact to digits, dorsum, and plantar aspects of the feet.     Derm: Dressing is clean dry and intact.  Sutures are intact.  No redness, dehiscence or signs of acute infection noted.     Musculoskeletal:  No foot deformity noted.       Assessment: Post-operative state     Medical Decision Making/Plan: At this time, the sutures were removed.  Patient can get the foot wet.  She can wear regular sock.  We will have her continue weightbearing in the boot for the next 2 weeks.  In 2 weeks she can start shoe around the house and boot outside of the house.  We will have her follow-up in 1 month.     All questions were answered to patient satisfaction and she will call further questions or concerns.    Linnette Garcia DPM, Podiatry/Foot and Ankle Surgery      Again, thank you for allowing me to participate in the care of your patient.        Sincerely,        Linnette Garcia DPM, Podiatry/Foot and Ankle Surgery

## 2024-08-20 NOTE — PATIENT INSTRUCTIONS
Thank you for choosing [facility] Podiatry / Foot & Ankle Surgery!    DR ECHOLS'S CLINIC:  [facility]   [address]   [place], [place] [address] [phone]    (Tues, Wed, Thur am, Fri pm)     [facility]  [address], [place] [address]  [phone]    (Friday mornings)     TRIAGE LINE: [phone]  APPOINTMENTS: [phone]  RADIOLOGY: [phone]  SET UP SURGERY: [phone]  PHYSICAL THERAPY: [phone]   FAX NUMBER: [phone]  BILLING QUESTIONS: [phone]       Follow up: 1 month      SCAR CARE PROTOCOL  Scarring is an unfortunate but unavoidable part of surgery.  Every person scars differently and there is no way to predict how an individual's final scar will look.  Now that the sutures have been removed one can begin taking some steps to help minimize the appearance of scarring.    WOUND HEALING  As soon as the skin is incised during surgery, the body is taking steps to prepare for healing. After about 3 days, the body has sent cells to the incision to begin the healing process. These cells, called fibroblasts, make collagen, a protein in the skin that helps provide strength. Once the skin has been sufficiently strengthened, the sutures are removed. Over the next year, the body synthesizes new collagen and breaks down old collagen to help achieve a strong scar that allows the foot/ankle to function appropriately. This is where patients can help the appearance of the scar, as it will change over the next year.    STEPS  1. Do not expose the scar to the sun for 1 year.  2. Any sun exposure may permanently darken the appearance of the scar.  3. Wear shoes/socks or cover your scar with zinc oxide.  4. Massage the scar 2-3 times per day.  -Massage the entire length of the scar with gentle to moderate pressure.  -Pressure can help flatten the scar.  5. Lotion/Vitamin E helps keep the tissue soft.  6. Try over the counter scar products such  as Mederma or Scar Zone.  -These are available at any pharmacy without a prescription.  -Patients must use these for extended periods of time (6-12 months) to see a difference.

## 2024-08-20 NOTE — PROGRESS NOTES
Podiatry / Foot and Ankle Surgery Progress Note    August 20, 2024    Subject: Patient was seen for 2 week status post plantar fasciotomy and gastroc anemias lengthening left foot.. Notes pain has been okay. She is just been taking ibuprofen or Tylenol. Is not taking the oxycodone anymore. Here with her mom.     Objective:  Vitals: Wt 116.1 kg (256 lb)   LMP  (LMP Unknown)   BMI 47.72 kg/m    BMI= Body mass index is 47.72 kg/m .    General:  Patient is alert and orientated.  NAD.    Vascular:  DP and PT pulses are palpable.  No edema or varicosities noted.  CFT's < 3secs.  Skin temp is normal.     Neuro:  Light and gross touch sensation intact to digits, dorsum, and plantar aspects of the feet.     Derm: Dressing is clean dry and intact.  Sutures are intact.  No redness, dehiscence or signs of acute infection noted.     Musculoskeletal:  No foot deformity noted.       Assessment: Post-operative state     Medical Decision Making/Plan: At this time, the sutures were removed.  Patient can get the foot wet.  She can wear regular sock.  We will have her continue weightbearing in the boot for the next 2 weeks.  In 2 weeks she can start shoe around the house and boot outside of the house.  We will have her follow-up in 1 month.     All questions were answered to patient satisfaction and she will call further questions or concerns.    Linnette Garcia DPM, Podiatry/Foot and Ankle Surgery

## 2024-09-05 ENCOUNTER — ANCILLARY PROCEDURE (OUTPATIENT)
Dept: MAMMOGRAPHY | Facility: CLINIC | Age: 54
End: 2024-09-05
Attending: NURSE PRACTITIONER
Payer: COMMERCIAL

## 2024-09-05 DIAGNOSIS — Z12.31 ENCOUNTER FOR SCREENING MAMMOGRAM FOR BREAST CANCER: ICD-10-CM

## 2024-09-05 PROCEDURE — 77067 SCR MAMMO BI INCL CAD: CPT | Mod: TC | Performed by: RADIOLOGY

## 2024-09-05 PROCEDURE — 77063 BREAST TOMOSYNTHESIS BI: CPT | Mod: TC | Performed by: RADIOLOGY

## 2024-09-06 ENCOUNTER — TELEPHONE (OUTPATIENT)
Dept: PEDIATRICS | Facility: CLINIC | Age: 54
End: 2024-09-06

## 2024-09-06 ENCOUNTER — OFFICE VISIT (OUTPATIENT)
Dept: PEDIATRICS | Facility: CLINIC | Age: 54
End: 2024-09-06
Payer: COMMERCIAL

## 2024-09-06 VITALS
RESPIRATION RATE: 20 BRPM | DIASTOLIC BLOOD PRESSURE: 74 MMHG | WEIGHT: 253.3 LBS | HEART RATE: 79 BPM | HEIGHT: 62 IN | OXYGEN SATURATION: 98 % | TEMPERATURE: 97.7 F | BODY MASS INDEX: 46.61 KG/M2 | SYSTOLIC BLOOD PRESSURE: 106 MMHG

## 2024-09-06 DIAGNOSIS — H81.10 BENIGN PAROXYSMAL POSITIONAL VERTIGO, UNSPECIFIED LATERALITY: ICD-10-CM

## 2024-09-06 DIAGNOSIS — E66.01 MORBID OBESITY (H): ICD-10-CM

## 2024-09-06 DIAGNOSIS — Z00.00 HEALTHCARE MAINTENANCE: Primary | ICD-10-CM

## 2024-09-06 DIAGNOSIS — J45.40 MODERATE PERSISTENT ASTHMA WITHOUT COMPLICATION: ICD-10-CM

## 2024-09-06 DIAGNOSIS — E78.5 HYPERLIPIDEMIA, UNSPECIFIED HYPERLIPIDEMIA TYPE: ICD-10-CM

## 2024-09-06 PROCEDURE — 99396 PREV VISIT EST AGE 40-64: CPT | Mod: 25 | Performed by: NURSE PRACTITIONER

## 2024-09-06 PROCEDURE — 99214 OFFICE O/P EST MOD 30 MIN: CPT | Mod: 25 | Performed by: NURSE PRACTITIONER

## 2024-09-06 PROCEDURE — 90677 PCV20 VACCINE IM: CPT | Performed by: NURSE PRACTITIONER

## 2024-09-06 PROCEDURE — 90472 IMMUNIZATION ADMIN EACH ADD: CPT | Performed by: NURSE PRACTITIONER

## 2024-09-06 PROCEDURE — 90673 RIV3 VACCINE NO PRESERV IM: CPT | Performed by: NURSE PRACTITIONER

## 2024-09-06 PROCEDURE — 90715 TDAP VACCINE 7 YRS/> IM: CPT | Performed by: NURSE PRACTITIONER

## 2024-09-06 PROCEDURE — 90471 IMMUNIZATION ADMIN: CPT | Performed by: NURSE PRACTITIONER

## 2024-09-06 RX ORDER — AZELASTINE HYDROCHLORIDE 137 UG/1
1 SPRAY, METERED NASAL 2 TIMES DAILY
Qty: 90 ML | Refills: 3 | Status: SHIPPED | OUTPATIENT
Start: 2024-09-06

## 2024-09-06 ASSESSMENT — PAIN SCALES - GENERAL: PAINLEVEL: MODERATE PAIN (5)

## 2024-09-06 NOTE — PROGRESS NOTES
Preventive Care Visit  [facility] JOSEFINA NOVAK CNP, Family Medicine  Sep 6, 2024      Assessment & Plan     Healthcare maintenance    Moderate persistent asthma without complication  Stable on Advair  - azelastine 137 MCG/SPRAY SOLN; Mouthcard 1 spray into both nostrils 2 times daily.    Hyperlipidemia, unspecified hyperlipidemia type  Recheck. Working on weight loss.     Morbid obesity (H)  Recently started wegovy 0.25. Is tolerating but hasn't lost weight yet  -30 g protein TID and exercise when able  -Follow-up 3 months  - Semaglutide-Weight Management (WEGOVY) 0.5 MG/0.5ML pen; Inject 0.5 mg subcutaneously once a week for 28 days.  - Semaglutide-Weight Management (WEGOVY) 1 MG/0.5ML pen; Inject 1 mg subcutaneously once a week for 28 days.            Counseling  Appropriate preventive services were addressed with this patient via screening, questionnaire, or discussion as appropriate for fall prevention, nutrition, physical activity, Tobacco-use cessation, social engagement, weight loss and cognition.  Checklist reviewing preventive services available has been given to the patient.  Reviewed patient's diet, addressing concerns and/or questions.           Tomy Orozco is a 54 year old, presenting for the following:  Physical        9/6/2024     8:20 AM   Additional Questions   Roomed by Kanika GEE   Accompanied by None         9/6/2024     8:20 AM   Patient Reported Additional Medications   Patient reports taking the following new medications None        HPI              9/6/2024   General Health   How would you rate your overall physical health? Good   Feel stress (tense, anxious, or unable to sleep) To some extent      (!) STRESS CONCERN      9/6/2024   Nutrition   Three or more servings of calcium each day? Yes   Diet: Regular (no restrictions)   How many servings of fruit and vegetables per day? (!) 0-1   How many sweetened beverages each day? 0-1             9/6/2024   Exercise   Days per week of moderate/strenous exercise 0 days      (!) EXERCISE CONCERN      9/6/2024   Social Factors   Frequency of gathering with friends or relatives Once a week   Worry food won't last until get money to buy more No   Food not last or not have enough money for food? No   Do you have housing? (Housing is defined as stable permanent housing and does not include staying ouside in a car, in a tent, in an abandoned building, in an overnight shelter, or couch-surfing.) Yes   Are you worried about losing your housing? No   Lack of transportation? No   Unable to get utilities (heat,electricity)? No            9/6/2024   Fall Risk   Fallen 2 or more times in the past year? No   Trouble with walking or balance? No             9/6/2024   Dental   Dentist two times every year? Yes            9/6/2024   Substance Use   Alcohol more than 3/day or more than 7/wk No   Do you use any other substances recreationally? No        Social History     Tobacco Use    Smoking status: Never     Passive exposure: Past    Smokeless tobacco: Never   Vaping Use    Vaping status: Never Used   Substance Use Topics    Alcohol use: Yes     Comment: 1 time evry 3 months, 1 per time    Drug use: No           9/5/2024   LAST FHS-7 RESULTS   1st degree relative breast or ovarian cancer No   Any relative bilateral breast cancer No   Any male have breast cancer No   Any ONE woman have BOTH breast AND ovarian cancer No   Any woman with breast cancer before 50yrs No   2 or more relatives with breast AND/OR ovarian cancer No   2 or more relatives with breast AND/OR bowel cancer No                   9/6/2024   STI Screening   New sexual partner(s) since last STI/HIV test? No           Latest Ref Rng & Units 9/3/2021     7:42 AM 5/12/2017    11:11 AM 5/12/2017    10:05 AM   PAP / HPV   PAP  Negative for Intraepithelial Lesion or Malignancy (NILM)      PAP (Historical)    OTHER-NIL, See Result    HPV 16 DNA Negative Negative   "Negative     HPV 18 DNA Negative Negative  Negative     Other HR HPV Negative Negative  Negative       ASCVD Risk   The 10-year ASCVD risk score (Ad BOOGIE, et al., 2019) is: 1.4%    Values used to calculate the score:      Age: 54 years      Sex: Female      Is Non- : No      Diabetic: No      Tobacco smoker: No      Systolic Blood Pressure: 106 mmHg      Is BP treated: No      HDL Cholesterol: 55 mg/dL      Total Cholesterol: 234 mg/dL           Reviewed and updated as needed this visit by Provider                          Review of Systems  Constitutional, neuro, ENT, endocrine, pulmonary, cardiac, gastrointestinal, genitourinary, musculoskeletal, integument and psychiatric systems are negative, except as otherwise noted.     Objective    Exam  /74 (BP Location: Right arm, Patient Position: Sitting, Cuff Size: Adult Large)   Pulse 79   Temp 97.7  F (36.5  C) (Tympanic)   Resp 20   Ht 1.575 m (5' 2\")   Wt 114.9 kg (253 lb 4.8 oz)   LMP  (LMP Unknown)   SpO2 98%   BMI 46.33 kg/m     Estimated body mass index is 46.33 kg/m  as calculated from the following:    Height as of this encounter: 1.575 m (5' 2\").    Weight as of this encounter: 114.9 kg (253 lb 4.8 oz).    Physical Exam  GENERAL: alert and no distress  EYES: Eyes grossly normal to inspection, PERRL and conjunctivae and sclerae normal  HENT: ear canals and TM's normal, nose and mouth without ulcers or lesions  NECK: no adenopathy, no asymmetry, masses, or scars  RESP: lungs clear to auscultation - no rales, rhonchi or wheezes  CV: regular rate and rhythm, normal S1 S2, no S3 or S4, no murmur, click or rub, no peripheral edema  ABDOMEN: soft, nontender, no hepatosplenomegaly, no masses and bowel sounds normal  MS: no gross musculoskeletal defects noted, no edema. Boot on left foot  SKIN: no suspicious lesions or rashes  NEURO: Normal strength and tone, mentation intact and speech normal  PSYCH: mentation appears " normal, affect normal/bright        Signed Electronically by: JOSEFINA FLORES CNP

## 2024-09-06 NOTE — TELEPHONE ENCOUNTER
Prior Authorization Retail Medication Request    Medication/Dose: Semaglutide-Weight Management (WEGOVY) 0.5 MG/0.5ML pen  Diagnosis and ICD code (if different than what is on RX):  Morbid obesity (H) [E66.01]   New/renewal/insurance change PA/secondary ins. PA:  Previously Tried and Failed:    Rationale:      Insurance   Primary:   Insurance ID:  [id number]    Secondary (if applicable):  Insurance ID:      Pharmacy Information (if different than what is on RX)  Name:  [facility]  Phone:    Fax:

## 2024-09-09 NOTE — TELEPHONE ENCOUNTER
Retail Pharmacy Prior Authorization Team   Phone: [phone]    PRIOR AUTHORIZATION DENIED    Medication: WEGOVY 0.5 MG/0.5ML SC SOAJ  Insurance Company: [employer] - Phone [phone] Fax [phone]  Denial Date: 9/9/2024  Denial Reason(s): DRUG NOT COVERED - PLAN EXCLUSION      Appeal Information: IF THE PROVIDER WOULD LIKE TO APPEAL THIS DECISION PLEASE PROVIDE THE PA TEAM WITH A LETTER OF MEDICAL NECESSITY      Patient Notified: NO

## 2024-09-13 ENCOUNTER — THERAPY VISIT (OUTPATIENT)
Dept: PHYSICAL THERAPY | Facility: CLINIC | Age: 54
End: 2024-09-13
Attending: PODIATRIST
Payer: COMMERCIAL

## 2024-09-13 DIAGNOSIS — R26.2 DIFFICULTY WALKING: ICD-10-CM

## 2024-09-13 DIAGNOSIS — M79.672 LEFT FOOT PAIN: Primary | ICD-10-CM

## 2024-09-13 DIAGNOSIS — Z98.890 POST-OPERATIVE STATE: ICD-10-CM

## 2024-09-13 PROCEDURE — 97110 THERAPEUTIC EXERCISES: CPT | Mod: GP | Performed by: PHYSICAL THERAPIST

## 2024-09-13 PROCEDURE — 97161 PT EVAL LOW COMPLEX 20 MIN: CPT | Mod: GP | Performed by: PHYSICAL THERAPIST

## 2024-09-13 ASSESSMENT — ACTIVITIES OF DAILY LIVING (ADL)
LEFS_RAW_SCORE: INCOMPLETE
ANY_OF_YOUR_USUAL_WORK,_HOUSEWORK_OR_SCHOOL_ACTIVITIES: QUITE A BIT OF DIFFICULTY
LEFS_SCORE(%): INCOMPLETE
PLEASE_INDICATE_YOR_PRIMARY_REASON_FOR_REFERRAL_TO_THERAPY:: FOOT AND/OR ANKLE

## 2024-09-13 NOTE — PROGRESS NOTES
PHYSICAL THERAPY EVALUATION  Type of Visit: Evaluation       Fall Risk Screen:  Fall screen completed by: PT  Have you fallen 2 or more times in the past year?: No  Have you fallen and had an injury in the past year?: No  Is patient a fall risk?: No    Subjective       Presenting condition or subjective complaint: after surgery pt    Patient underwent L plantar fasciotomy and gastroc lengthening performed by Dr. Linnette Garcia on 8/5/2024 at [facility]. Discharged home with CAM boot, is currently wearing the CAM outside of the home and soft/thick slipper in the home as of 8/24/2024. Has not been able to progress to a firm, regular shoe due to pain over the incision. In general, states her L calf has been painful, describes the pain as a brad horse/cramping. States the cramping pain is random. Swelling comes and goes, is wearing a compression sock, but continues to swell. No longer requires anything for pain as the highest the pain has been is a 3/10. Has not been icing but is elevating in the afternoon after work.    Date of onset: 08/05/24    Relevant medical history: Arthritis; Asthma; Dizziness; Overweight   Dates & types of surgery: ankle and foot surger   aug 2024    Prior diagnostic imaging/testing results: MRI; CT scan; X-ray     Prior therapy history for the same diagnosis, illness or injury:        Prior Level of Function  Transfers: Independent  Ambulation: Independent  ADL: Independent  IADL:     Living Environment  Social support: With a significant other or spouse   Type of home: House   Stairs to enter the home:         Ramp: No   Stairs inside the home: Yes 18 Is there a railing: Yes     Help at home: None  Equipment owned: Crutches     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  Hobbies/Interests:      Patient goals for therapy: walk    Pain assessment: Pain denied     Objective         Gait:    Gait Type:  Antalgic   Weight Bearing Status:  PWB   Assistive Devices:   CAM  Deviations:  General Deviations:  Nora decr, stance time decr and stride length decr    Flexibility/Screens:       Lower Extremity:  Decreased left lower extremity flexibility:Gastroc and Soleus          Ankle/Foot Evaluation  ROM:    AROM:    Dorsiflexion:  Left:   2  Right:   10  Plantarflexion:  Left:  50    Right:  62  Inversion:  Left:  16     Right:  20  Eversion:  20     Right:  30        Strength:    Dorsiflexion:  Left:  5/5      Pain:   Right:  5/5    Pain:  Plantarflexion: Left:  3+/5    Pain:+   Right:  5/5   Pain:  Inversion:Left:  3+/5   Pain:    Right:  5/5   Pain:  Eversion:Left:  4/5   Pain:  Right:  5/5   Pain:                  SPECIAL TESTS: not assessed    PALPATION:   Left ankle tenderness present at:  gastroc/soleus; incisional and plantar fascia    EDEMA: Edema ankle: L: 28.5cm, R: 27cm.  Left ankle edema present at: medial; lateral; anterior; posterior; forefoot; general and 60 cm  Right ankle edema present at:  60cm      Figure 8 left: 60 cmFigure 8 right: 60cm  MOBILITY TESTING: not assessed                                      FUNCTIONAL TESTS: not assessed                                                            General       Assessment & Plan   CLINICAL IMPRESSIONS  Medical Diagnosis: Post-operative state    Treatment Diagnosis: L foot pain, difficulty walking   Impression/Assessment: Patient is a 54 year old female with L foot and difficulty complaints.  The following significant findings have been identified: Pain, Decreased ROM/flexibility, Decreased strength, Impaired balance, Decreased proprioception, Edema, Impaired gait, Impaired muscle performance, and Decreased activity tolerance. These impairments interfere with their ability to perform self care tasks, work tasks, recreational activities, household chores, household mobility, and community mobility as compared to previous level of function.     Clinical Decision Making (Complexity):  Clinical Presentation:  Stable/Uncomplicated  Clinical Presentation Rationale: based on medical and personal factors listed in PT evaluation  Clinical Decision Making (Complexity): Low complexity    PLAN OF CARE  Treatment Interventions:  Interventions: Gait Training, Manual Therapy, Neuromuscular Re-education, Therapeutic Activity, Therapeutic Exercise, Self-Care/Home Management    Long Term Goals     PT Goal 1  Goal Identifier: walking  Goal Description: Patient will be able to walk 60 minutes w/o AD, boot or brace in order for community and household mobility.  Target Date: 12/06/24  PT Goal 2  Goal Identifier: stairs  Goal Description: Patient will be able to navigate 2 flights of stairs reciprocally in order for household and community mobility.  Target Date: 12/06/24      Frequency of Treatment: 1x/wk for 4 weeks, then every other week for 8 weeks  Duration of Treatment: 12 weeks    Recommended Referrals to Other Professionals:   Education Assessment:   Learner/Method: Patient;Listening;Demonstration;Pictures/Video  Education Comments: Educated on findings of exam,  importance of PT, icing and elevating, expected frequency/duration of PT.    Risks and benefits of evaluation/treatment have been explained.   Patient/Family/caregiver agrees with Plan of Care.     Evaluation Time:     PT Eval, Low Complexity Minutes (47785): 20       Signing Clinician: Ruth Shepard, PT

## 2024-09-17 ENCOUNTER — OFFICE VISIT (OUTPATIENT)
Dept: PODIATRY | Facility: CLINIC | Age: 54
End: 2024-09-17
Payer: COMMERCIAL

## 2024-09-17 VITALS — BODY MASS INDEX: 46.27 KG/M2 | WEIGHT: 253 LBS

## 2024-09-17 DIAGNOSIS — Z98.890 POST-OPERATIVE STATE: Primary | ICD-10-CM

## 2024-09-17 PROCEDURE — 99024 POSTOP FOLLOW-UP VISIT: CPT | Performed by: PODIATRIST

## 2024-09-17 NOTE — PROGRESS NOTES
Podiatry / Foot and Ankle Surgery Progress Note    September 17, 2024    Subject: Patient was seen for 6 weeks status post left plantar fasciotomy and gastroc recession.  Overall she is doing well.  Has been walking around issues at home the last 2 weeks and been doing some physical therapy.  She gets a little aching to the back of the tendon on the left foot and some swelling but has been wearing compression socks which helps.  Denies fever, nausea, calf pain.  No other concerns today.    Objective:  Vitals: Wt 114.8 kg (253 lb)   LMP  (LMP Unknown)   BMI 46.27 kg/m      General:  Patient is alert and orientated.  NAD.    Vascular:  DP and PT pulses are palpable.  No edema or varicosities noted.  CFT's < 3secs.  Skin temp is normal.    Neuro:  Light and gross touch sensation intact to digits, dorsum, and plantar aspects of the feet.    Derm: Incision is well-healed.  No open lesions or signs of acute infection noted.    Musculoskeletal:  No foot deformity noted.      Assessment: Post-operative state    Medical Decision Making/Plan: At this time we will have her start transitioning out of the boot into an ankle brace and shoes.  We will have her continue with physical therapy.  Recommend holding off on impact activity for the next 3 weeks that she continues to build her leg strength back up.  After that she can start increasing her activity as tolerated.  Normally at this time we would have her follow-up as needed    Questions were answered to patient satisfaction and she will call further questions or concerns.    Patient Risk Factor:  Patient is a low risk factor for infection.     Linnette Garcia DPM, Podiatry/Foot and Ankle Surgery

## 2024-09-17 NOTE — LETTER
9/17/2024      Pam Gaviria  [address]  [place] [address]      Dear Colleague,    Thank you for referring your patient, Pam Gaviria, to the [facility]. Please see a copy of my visit note below.    Podiatry / Foot and Ankle Surgery Progress Note    September 17, 2024    Subject: Patient was seen for 6 weeks status post left plantar fasciotomy and gastroc recession.  Overall she is doing well.  Has been walking around issues at home the last 2 weeks and been doing some physical therapy.  She gets a little aching to the back of the tendon on the left foot and some swelling but has been wearing compression socks which helps.  Denies fever, nausea, calf pain.  No other concerns today.    Objective:  Vitals: Wt 114.8 kg (253 lb)   LMP  (LMP Unknown)   BMI 46.27 kg/m      General:  Patient is alert and orientated.  NAD.    Vascular:  DP and PT pulses are palpable.  No edema or varicosities noted.  CFT's < 3secs.  Skin temp is normal.    Neuro:  Light and gross touch sensation intact to digits, dorsum, and plantar aspects of the feet.    Derm: Incision is well-healed.  No open lesions or signs of acute infection noted.    Musculoskeletal:  No foot deformity noted.      Assessment: Post-operative state    Medical Decision Making/Plan: At this time we will have her start transitioning out of the boot into an ankle brace and shoes.  We will have her continue with physical therapy.  Recommend holding off on impact activity for the next 3 weeks that she continues to build her leg strength back up.  After that she can start increasing her activity as tolerated.  Normally at this time we would have her follow-up as needed    Questions were answered to patient satisfaction and she will call further questions or concerns.    Patient Risk Factor:  Patient is a low risk factor for infection.     Linnette Garcia DPM, Podiatry/Foot and Ankle Surgery      Again, thank you for  allowing me to participate in the care of your patient.        Sincerely,        Linnette Garcia DPM, Podiatry/Foot and Ankle Surgery

## 2024-09-17 NOTE — PATIENT INSTRUCTIONS
Thank you for choosing [facility] Podiatry / Foot & Ankle Surgery!    DR ECHOLS'S CLINIC:  [facility]   [address]   [place], [place] [address] [phone]    (Tues, Wed, Thur am, Fri pm)     [facility]  [address], [place] [address] [phone]    (Friday mornings)     TRIAGE LINE: [phone]  APPOINTMENTS: [phone]  RADIOLOGY: [phone]  SET UP SURGERY: [phone]  PHYSICAL THERAPY: [phone]   FAX NUMBER: [phone]  BILLING QUESTIONS: [phone]       Follow up: as needed.

## 2024-09-25 ENCOUNTER — THERAPY VISIT (OUTPATIENT)
Dept: PHYSICAL THERAPY | Facility: CLINIC | Age: 54
End: 2024-09-25
Attending: PODIATRIST
Payer: COMMERCIAL

## 2024-09-25 DIAGNOSIS — M79.672 LEFT FOOT PAIN: ICD-10-CM

## 2024-09-25 DIAGNOSIS — Z98.890 POST-OPERATIVE STATE: Primary | ICD-10-CM

## 2024-09-25 DIAGNOSIS — R26.2 DIFFICULTY WALKING: ICD-10-CM

## 2024-09-25 PROCEDURE — 97110 THERAPEUTIC EXERCISES: CPT | Mod: GP | Performed by: PHYSICAL THERAPIST

## 2024-09-25 PROCEDURE — 97140 MANUAL THERAPY 1/> REGIONS: CPT | Mod: GP | Performed by: PHYSICAL THERAPIST

## 2024-10-03 ENCOUNTER — THERAPY VISIT (OUTPATIENT)
Dept: PHYSICAL THERAPY | Facility: CLINIC | Age: 54
End: 2024-10-03
Attending: PODIATRIST
Payer: COMMERCIAL

## 2024-10-03 DIAGNOSIS — Z98.890 POST-OPERATIVE STATE: Primary | ICD-10-CM

## 2024-10-03 DIAGNOSIS — R26.2 DIFFICULTY WALKING: ICD-10-CM

## 2024-10-03 DIAGNOSIS — M79.672 LEFT FOOT PAIN: ICD-10-CM

## 2024-10-03 PROCEDURE — 97110 THERAPEUTIC EXERCISES: CPT | Mod: GP | Performed by: PHYSICAL THERAPIST

## 2024-10-03 PROCEDURE — 97140 MANUAL THERAPY 1/> REGIONS: CPT | Mod: GP | Performed by: PHYSICAL THERAPIST

## 2024-10-10 ENCOUNTER — THERAPY VISIT (OUTPATIENT)
Dept: PHYSICAL THERAPY | Facility: CLINIC | Age: 54
End: 2024-10-10
Attending: PODIATRIST
Payer: COMMERCIAL

## 2024-10-10 DIAGNOSIS — Z98.890 POST-OPERATIVE STATE: Primary | ICD-10-CM

## 2024-10-10 DIAGNOSIS — M79.672 LEFT FOOT PAIN: ICD-10-CM

## 2024-10-10 DIAGNOSIS — R26.2 DIFFICULTY WALKING: ICD-10-CM

## 2024-10-10 PROCEDURE — 97110 THERAPEUTIC EXERCISES: CPT | Mod: GP | Performed by: PHYSICAL THERAPIST

## 2024-10-10 PROCEDURE — 97140 MANUAL THERAPY 1/> REGIONS: CPT | Mod: GP | Performed by: PHYSICAL THERAPIST

## 2024-10-10 PROCEDURE — 97112 NEUROMUSCULAR REEDUCATION: CPT | Mod: GP | Performed by: PHYSICAL THERAPIST

## 2024-10-17 ENCOUNTER — THERAPY VISIT (OUTPATIENT)
Dept: PHYSICAL THERAPY | Facility: CLINIC | Age: 54
End: 2024-10-17
Attending: PODIATRIST
Payer: COMMERCIAL

## 2024-10-17 DIAGNOSIS — R26.2 DIFFICULTY WALKING: ICD-10-CM

## 2024-10-17 DIAGNOSIS — Z98.890 POST-OPERATIVE STATE: Primary | ICD-10-CM

## 2024-10-17 DIAGNOSIS — M79.672 LEFT FOOT PAIN: ICD-10-CM

## 2024-10-17 PROCEDURE — 97140 MANUAL THERAPY 1/> REGIONS: CPT | Mod: GP | Performed by: PHYSICAL THERAPIST

## 2024-10-17 PROCEDURE — 97530 THERAPEUTIC ACTIVITIES: CPT | Mod: GP | Performed by: PHYSICAL THERAPIST

## 2024-10-17 PROCEDURE — 97110 THERAPEUTIC EXERCISES: CPT | Mod: GP | Performed by: PHYSICAL THERAPIST

## 2024-10-25 ENCOUNTER — THERAPY VISIT (OUTPATIENT)
Dept: PHYSICAL THERAPY | Facility: CLINIC | Age: 54
End: 2024-10-25
Payer: COMMERCIAL

## 2024-10-25 DIAGNOSIS — R26.2 DIFFICULTY WALKING: ICD-10-CM

## 2024-10-25 DIAGNOSIS — M79.672 LEFT FOOT PAIN: ICD-10-CM

## 2024-10-25 DIAGNOSIS — Z98.890 POST-OPERATIVE STATE: Primary | ICD-10-CM

## 2024-10-25 PROCEDURE — 97110 THERAPEUTIC EXERCISES: CPT | Mod: GP | Performed by: PHYSICAL THERAPIST

## 2024-10-25 PROCEDURE — 97112 NEUROMUSCULAR REEDUCATION: CPT | Mod: GP | Performed by: PHYSICAL THERAPIST

## 2024-10-25 PROCEDURE — 97140 MANUAL THERAPY 1/> REGIONS: CPT | Mod: GP | Performed by: PHYSICAL THERAPIST

## 2024-10-31 ENCOUNTER — THERAPY VISIT (OUTPATIENT)
Dept: PHYSICAL THERAPY | Facility: CLINIC | Age: 54
End: 2024-10-31
Payer: COMMERCIAL

## 2024-10-31 DIAGNOSIS — M79.672 LEFT FOOT PAIN: ICD-10-CM

## 2024-10-31 DIAGNOSIS — R26.2 DIFFICULTY WALKING: ICD-10-CM

## 2024-10-31 DIAGNOSIS — Z98.890 POST-OPERATIVE STATE: Primary | ICD-10-CM

## 2024-10-31 PROCEDURE — 97112 NEUROMUSCULAR REEDUCATION: CPT | Mod: GP | Performed by: PHYSICAL THERAPIST

## 2024-10-31 PROCEDURE — 97140 MANUAL THERAPY 1/> REGIONS: CPT | Mod: GP | Performed by: PHYSICAL THERAPIST

## 2024-10-31 PROCEDURE — 97110 THERAPEUTIC EXERCISES: CPT | Mod: GP | Performed by: PHYSICAL THERAPIST

## 2024-11-05 ENCOUNTER — VIRTUAL VISIT (OUTPATIENT)
Dept: PEDIATRICS | Facility: CLINIC | Age: 54
End: 2024-11-05
Payer: COMMERCIAL

## 2024-11-05 DIAGNOSIS — E66.01 MORBID OBESITY (H): ICD-10-CM

## 2024-11-05 DIAGNOSIS — J45.40 MODERATE PERSISTENT ASTHMA WITHOUT COMPLICATION: ICD-10-CM

## 2024-11-05 PROCEDURE — 99441 PR PHYSICIAN TELEPHONE EVALUATION 5-10 MIN: CPT | Mod: 93 | Performed by: NURSE PRACTITIONER

## 2024-11-05 RX ORDER — FLUTICASONE PROPIONATE AND SALMETEROL 500; 50 UG/1; UG/1
1 POWDER RESPIRATORY (INHALATION) EVERY 12 HOURS
Qty: 3 EACH | Refills: 3 | Status: SHIPPED | OUTPATIENT
Start: 2024-11-05 | End: 2025-02-03

## 2024-11-05 ASSESSMENT — ASTHMA QUESTIONNAIRES
QUESTION_4 LAST FOUR WEEKS HOW OFTEN HAVE YOU USED YOUR RESCUE INHALER OR NEBULIZER MEDICATION (SUCH AS ALBUTEROL): NOT AT ALL
QUESTION_1 LAST FOUR WEEKS HOW MUCH OF THE TIME DID YOUR ASTHMA KEEP YOU FROM GETTING AS MUCH DONE AT WORK, SCHOOL OR AT HOME: NONE OF THE TIME
QUESTION_2 LAST FOUR WEEKS HOW OFTEN HAVE YOU HAD SHORTNESS OF BREATH: NOT AT ALL
ACT_TOTALSCORE: 25
QUESTION_3 LAST FOUR WEEKS HOW OFTEN DID YOUR ASTHMA SYMPTOMS (WHEEZING, COUGHING, SHORTNESS OF BREATH, CHEST TIGHTNESS OR PAIN) WAKE YOU UP AT NIGHT OR EARLIER THAN USUAL IN THE MORNING: NOT AT ALL
QUESTION_5 LAST FOUR WEEKS HOW WOULD YOU RATE YOUR ASTHMA CONTROL: COMPLETELY CONTROLLED
ACT_TOTALSCORE: 25

## 2024-11-05 NOTE — PROGRESS NOTES
Pam is a 54 year old who is being evaluated via a billable telephone visit.    What phone number would you like to be contacted at? [phone]  How would you like to obtain your AVS? Mariel  Originating Location (pt. Location): Home    Distant Location (provider location):  Off-site    Assessment & Plan     Moderate persistent asthma without complication  Stable  - fluticasone-salmeterol (ADVAIR) 500-50 MCG/ACT inhaler; Inhale 1 puff into the lungs every 12 hours.    Morbid obesity (H)  Improving on Wegovy. Tolerating well  -Start tracking protein more consistently  - Semaglutide-Weight Management (WEGOVY) 1 MG/0.5ML pen; Inject 1 mg subcutaneously once a week.        Subjective   Pam is a 54 year old, presenting for the following health issues:  No chief complaint on file.      11/5/2024     8:50 AM   Additional Questions   Roomed by Michelle Fitzgerald CMA     History of Present Illness     Asthma:  She presents for follow up of asthma.  She has no cough, no wheezing, and no shortness of breath. She is not using a relief medication.  She does not miss any doses of her controller medication throughout the week. Patient is aware of the following triggers: none. The patient has not had a visit to the Emergency Room, Urgent Care or Hospital due to asthma since the last clinic visit.     She eats 2-3 servings of fruits and vegetables daily.She consumes 0 sweetened beverage(s) daily.She exercises with enough effort to increase her heart rate 10 to 19 minutes per day.  She exercises with enough effort to increase her heart rate 3 or less days per week.   She is taking medications regularly.    Discuss Wegovy     Patient got a letter from insurance - must be prescribed 90 day Rx for advair - Advair goes to [facility] must be 90 day supply - [facility]            Review of Systems  Constitutional, neuro, ENT, endocrine, pulmonary, cardiac, gastrointestinal, genitourinary, musculoskeletal,  integument and psychiatric systems are negative, except as otherwise noted.      Objective    Vitals - Patient Reported  Weight (Patient Reported): 110 kg (242 lb 6.4 oz)      Physical Exam   General: Alert and no distress //Respiratory: No audible wheeze, cough, or shortness of breath // Psychiatric:  Appropriate affect, tone, and pace of words            Phone call duration: 10 minutes  Signed Electronically by: JOSEFINA FLORES CNP

## 2024-11-07 ENCOUNTER — THERAPY VISIT (OUTPATIENT)
Dept: PHYSICAL THERAPY | Facility: CLINIC | Age: 54
End: 2024-11-07
Payer: COMMERCIAL

## 2024-11-07 DIAGNOSIS — R26.2 DIFFICULTY WALKING: ICD-10-CM

## 2024-11-07 DIAGNOSIS — Z98.890 POST-OPERATIVE STATE: Primary | ICD-10-CM

## 2024-11-07 DIAGNOSIS — M79.672 LEFT FOOT PAIN: ICD-10-CM

## 2024-11-07 PROCEDURE — 97112 NEUROMUSCULAR REEDUCATION: CPT | Mod: GP | Performed by: PHYSICAL THERAPIST

## 2024-11-07 PROCEDURE — 97110 THERAPEUTIC EXERCISES: CPT | Mod: GP | Performed by: PHYSICAL THERAPIST

## 2024-11-07 PROCEDURE — 97140 MANUAL THERAPY 1/> REGIONS: CPT | Mod: GP | Performed by: PHYSICAL THERAPIST

## 2024-11-07 NOTE — PROGRESS NOTES
Called and was able to speak with Pam and scheduled a virtual follow up with her PCP.     Agustín Means MA on 11/7/2024 at 11:02 AM

## 2024-11-14 ENCOUNTER — THERAPY VISIT (OUTPATIENT)
Dept: PHYSICAL THERAPY | Facility: CLINIC | Age: 54
End: 2024-11-14
Payer: COMMERCIAL

## 2024-11-14 DIAGNOSIS — Z98.890 POST-OPERATIVE STATE: Primary | ICD-10-CM

## 2024-11-14 DIAGNOSIS — R26.2 DIFFICULTY WALKING: ICD-10-CM

## 2024-11-14 DIAGNOSIS — M79.672 LEFT FOOT PAIN: ICD-10-CM

## 2024-11-14 PROCEDURE — 97110 THERAPEUTIC EXERCISES: CPT | Mod: GP | Performed by: PHYSICAL THERAPIST

## 2024-11-14 PROCEDURE — 97140 MANUAL THERAPY 1/> REGIONS: CPT | Mod: GP | Performed by: PHYSICAL THERAPIST

## 2024-11-15 NOTE — LETTER
My Asthma Action Plan  Name: Pam Gaviria   YOB: 1970  Date: 5/12/2017   My doctor: JOSEFINA Horne CNP   My [facility]: [facility]        My Control Medicine: Ellipta, Singulair, Flovent  My Rescue Medicine: Albuterol (Proair/Ventolin/Proventil) inhaler .   My Asthma Severity: moderate persistent  Avoid your asthma triggers: smoke, upper respiratory infections, pollens and strong odors and fumes               GREEN ZONE     Good Control    I feel good    No cough or wheeze    Can work, sleep and play without asthma symptoms       Take your asthma control medicine every day.     1. If exercise triggers your asthma, take your rescue medication    15 minutes before exercise or sports, and    During exercise if you have asthma symptoms  2. Spacer to use with inhaler: If you have a spacer, make sure to use it with your inhaler             YELLOW ZONE     Getting Worse  I have ANY of these:    I do not feel good    Cough or wheeze    Chest feels tight    Wake up at night   1. Keep taking your Green Zone medications  2. Start taking your rescue medicine:    every 20 minutes for up to 1 hour. Then every 4 hours for 24-48 hours.  3. If you stay in the Yellow Zone for more than 12-24 hours, contact your doctor.  4. If you do not return to the Green Zone in 12-24 hours or you get worse, start taking your oral steroid medicine if prescribed by your provider.           RED ZONE     Medical Alert - Get Help  I have ANY of these:    I feel awful    Medicine is not helping    Breathing getting harder    Trouble walking or talking    Nose opens wide to breathe       1. Take your rescue medicine NOW  2. If your provider has prescribed an oral steroid medicine, start taking it NOW  3. Call your doctor NOW  4. If you are still in the Red Zone after 20 minutes and you have not reached your doctor:    Take your rescue medicine again and    Call 911 or go to the emergency room right away    See your  regular doctor within 2 weeks of an Emergency Room or Urgent Care visit for follow-up treatment.        Electronically signed by: Michelle Fitzgerald, May 12, 2017    Annual Reminders:  Meet with Asthma Educator,  Flu Shot in the Fall, consider Pneumonia Vaccination for patients with asthma (aged 19 and older).    [facility]: [id number] [address]  AT [facility]                    Asthma Triggers  How To Control Things That Make Your Asthma Worse    Triggers are things that make your asthma worse.  Look at the list below to help you find your triggers and what you can do about them.  You can help prevent asthma flare-ups by staying away from your triggers.      Trigger                                                          What you can do   Cigarette Smoke  Tobacco smoke can make asthma worse. Do not allow smoking in your home, car or around you.  Be sure no one smokes at a child s day care or school.  If you smoke, ask your health care provider for ways to help you quit.  Ask family members to quit too.  Ask your health care provider for a referral to Quit Plan to help you quit smoking, or call 5-871-611-PLAN.     Colds, Flu, Bronchitis  These are common triggers of asthma. Wash your hands often.  Don t touch your eyes, nose or mouth.  Get a flu shot every year.     Dust Mites  These are tiny bugs that live in cloth or carpet. They are too small to see. Wash sheets and blankets in hot water every week.   Encase pillows and mattress in dust mite proof covers.  Avoid having carpet if you can. If you have carpet, vacuum weekly.   Use a dust mask and HEPA vacuum.   Pollen and Outdoor Mold  Some people are allergic to trees, grass, or weed pollen, or molds. Try to keep your windows closed.  Limit time out doors when pollen count is high.   Ask you health care provider about taking medicine during allergy season.     Animal Dander  Some people are allergic to skin flakes,  urine or saliva from pets with fur or feathers. Keep pets with fur or feathers out of your home.    If you can t keep the pet outdoors, then keep the pet out of your bedroom.  Keep the bedroom door closed.  Keep pets off cloth furniture and away from stuffed toys.     Mice, Rats, and Cockroaches  Some people are allergic to the waste from these pests.   Cover food and garbage.  Clean up spills and food crumbs.  Store grease in the refrigerator.   Keep food out of the bedroom.   Indoor Mold  This can be a trigger if your home has high moisture. Fix leaking faucets, pipes, or other sources of water.   Clean moldy surfaces.  Dehumidify basement if it is damp and smelly.   Smoke, Strong Odors, and Sprays  These can reduce air quality. Stay away from strong odors and sprays, such as perfume, powder, hair spray, paints, smoke incense, paint, cleaning products, candles and new carpet.   Exercise or Sports  Some people with asthma have this trigger. Be active!  Ask your doctor about taking medicine before sports or exercise to prevent symptoms.    Warm up for 5-10 minutes before and after sports or exercise.     Other Triggers of Asthma  Cold air:  Cover your nose and mouth with a scarf.  Sometimes laughing or crying can be a trigger.  Some medicines and food can trigger asthma.      177.8

## 2024-12-05 ENCOUNTER — MYC MEDICAL ADVICE (OUTPATIENT)
Dept: PEDIATRICS | Facility: CLINIC | Age: 54
End: 2024-12-05
Payer: COMMERCIAL

## 2024-12-05 NOTE — TELEPHONE ENCOUNTER
Per chart review:    VV 11/5/24:  No mention of injections or vein specialist    OV 9/6/24:  No mention of injections or vein specialist  - PT placed for vertigo    Waiting on response from patient.     Gill Barfield RN

## 2025-02-03 ENCOUNTER — VIRTUAL VISIT (OUTPATIENT)
Dept: PEDIATRICS | Facility: CLINIC | Age: 55
End: 2025-02-03
Payer: COMMERCIAL

## 2025-02-03 DIAGNOSIS — M25.511 RIGHT SHOULDER PAIN, UNSPECIFIED CHRONICITY: ICD-10-CM

## 2025-02-03 DIAGNOSIS — E66.01 MORBID OBESITY (H): ICD-10-CM

## 2025-02-03 DIAGNOSIS — M25.561 RIGHT KNEE PAIN, UNSPECIFIED CHRONICITY: Primary | ICD-10-CM

## 2025-02-03 PROCEDURE — 98014 SYNCH AUDIO-ONLY EST MOD 30: CPT | Performed by: NURSE PRACTITIONER

## 2025-02-03 NOTE — PROGRESS NOTES
"Pam is a 54 year old who is being evaluated via a billable telephone visit.      Originating Location (pt. Location): Home    Distant Location (provider location):  Off-site  Telephone visit completed due to the patient did not have access to video, while the distant provider did.    Assessment & Plan     Right knee pain, unspecified chronicity  Hx meniscus tear and arthritis. Ok to ask for referral without a visit when she has better insurance    Right shoulder pain, unspecified chronicity  Chronic. Ok to ask for referral without a visit when she has better insurance    Morbid obesity (H)  Has lost weight with wegovy 1mg but would like to increase, as she has hit a plateau.   -Increase to 1.7  -Currently getting 60-90g protein. Shoot for closer to 90  -Continue PT exercises and walking. Add as much exercise as possible.  - Semaglutide-Weight Management (WEGOVY) 1.7 MG/0.75ML pen; Inject 1.7 mg subcutaneously once a week.  -Follow-up 4 months, sooner if needed    BMI  Estimated body mass index is 46.27 kg/m  as calculated from the following:    Height as of 9/6/24: 1.575 m (5' 2\").    Weight as of 9/17/24: 114.8 kg (253 lb).   Weight management plan: Discussed healthy diet and exercise guidelines          Subjective     R shoulder   R knee  Pam is a 54 year old, presenting for the following health issues:  No chief complaint on file.    HPI       Review of Systems  Constitutional, HEENT, cardiovascular, pulmonary, gi and gu systems are negative, except as otherwise noted.      Objective           Vitals:  No vitals were obtained today due to virtual visit.    Physical Exam   General: Alert and no distress //Respiratory: No audible wheeze, cough, or shortness of breath // Psychiatric:  Appropriate affect, tone, and pace of words            Phone call duration: 11The longitudinal plan of care for the diagnosis(es)/condition(s) as documented were addressed during this visit. Due to the added complexity in care, " I will continue to support Pam in the subsequent management and with ongoing continuity of care. minutes  Signed Electronically by: JOSEFINA FLORES CNP

## 2025-04-11 PROBLEM — R26.2 DIFFICULTY WALKING: Status: RESOLVED | Noted: 2024-09-13 | Resolved: 2025-04-11

## 2025-04-11 PROBLEM — Z98.890 POST-OPERATIVE STATE: Status: RESOLVED | Noted: 2024-09-13 | Resolved: 2025-04-11

## 2025-04-11 PROBLEM — M79.672 LEFT FOOT PAIN: Status: RESOLVED | Noted: 2024-09-13 | Resolved: 2025-04-11

## 2025-04-21 ENCOUNTER — ANCILLARY PROCEDURE (OUTPATIENT)
Dept: GENERAL RADIOLOGY | Facility: CLINIC | Age: 55
End: 2025-04-21
Attending: PHYSICIAN ASSISTANT
Payer: COMMERCIAL

## 2025-04-21 ENCOUNTER — OFFICE VISIT (OUTPATIENT)
Dept: PEDIATRICS | Facility: CLINIC | Age: 55
End: 2025-04-21
Payer: COMMERCIAL

## 2025-04-21 VITALS
HEART RATE: 75 BPM | OXYGEN SATURATION: 96 % | WEIGHT: 226 LBS | RESPIRATION RATE: 18 BRPM | BODY MASS INDEX: 41.59 KG/M2 | SYSTOLIC BLOOD PRESSURE: 107 MMHG | DIASTOLIC BLOOD PRESSURE: 75 MMHG | TEMPERATURE: 98 F | HEIGHT: 62 IN

## 2025-04-21 DIAGNOSIS — M25.562 ACUTE PAIN OF LEFT KNEE: Primary | ICD-10-CM

## 2025-04-21 DIAGNOSIS — M25.562 ACUTE PAIN OF LEFT KNEE: ICD-10-CM

## 2025-04-21 PROCEDURE — 3074F SYST BP LT 130 MM HG: CPT | Performed by: PHYSICIAN ASSISTANT

## 2025-04-21 PROCEDURE — 99213 OFFICE O/P EST LOW 20 MIN: CPT | Performed by: PHYSICIAN ASSISTANT

## 2025-04-21 PROCEDURE — 1125F AMNT PAIN NOTED PAIN PRSNT: CPT | Performed by: PHYSICIAN ASSISTANT

## 2025-04-21 PROCEDURE — 3078F DIAST BP <80 MM HG: CPT | Performed by: PHYSICIAN ASSISTANT

## 2025-04-21 PROCEDURE — 73562 X-RAY EXAM OF KNEE 3: CPT | Mod: TC | Performed by: RADIOLOGY

## 2025-04-21 ASSESSMENT — PAIN SCALES - GENERAL: PAINLEVEL_OUTOF10: MODERATE PAIN (5)

## 2025-04-21 NOTE — PROGRESS NOTES
"  Assessment & Plan     Acute pain of left knee  Wrapped with ACE and encourage RICE. Patient referred to [facility] given history of OA knees bilaterally  - XR Knee Left 3 Views; Future  - Orthopedic  Referral; Future    Subjective   Pam is a 54 year old, presenting for the following health issues:  Knee Pain (Friday something popped Left Knee severe pain )      4/21/2025    10:29 AM   Additional Questions   Roomed by NM         4/21/2025    10:29 AM   Patient Reported Additional Medications   Patient reports taking the following new medications None     HPI    Pain History:  When did you first notice your pain? Friday   Have you seen anyone else for your pain? Yes - [facility] and [email or web address] have not been seen for this in 3 yrs   How has your pain affected your ability to work? Able to work as long as I am sitting   Where in your body do you have pain? Musculoskeletal problem/pain  Onset/Duration: Left Knee   Description  Location: knee - left  Joint Swelling:  laterally  Redness: No  Pain: YES  Felt immediate pain and then painful with movement and weight bearing  Warmth: No  Intensity:  moderate  Progression of Symptoms:  intermittent  Accompanying signs and symptoms:   Locking: no  Giving way: no   Fevers: No  Numbness/tingling/weakness: YES- weakness   History  Trauma to the area: No  Recent illness:  No  Previous similar problem: No  Previous evaluation:  No  Precipitating or alleviating factors:  Aggravating factors include: standing, walking, climbing stairs, lifting, and exercise; electric scooter, metal brace  Therapies tried and outcome: rest/inactivity and Ibuprofen    Right knee: OA and \"no meniscus:   Left knee: \"no ACL\"    Review of Systems  Constitutional, HEENT, cardiovascular, pulmonary, gi and gu systems are negative, except as otherwise noted.      Objective    /75 (BP Location: Right arm, Patient Position: Sitting, Cuff Size: Adult Large)   Pulse 75   Temp 98  F (36.7  C) " "(Temporal)   Resp 18   Ht 1.57 m (5' 1.81\")   Wt 102.5 kg (226 lb)   LMP  (LMP Unknown)   SpO2 96%   BMI 41.59 kg/m    Body mass index is 41.59 kg/m .  Physical Exam   GENERAL: alert and no distress  ORTHO: Knee Exam: Inspection: small effusion  Tender: medial and lateral joint line  Active Range of Motion: pain with full extension. Crepitus with ROM  Strength: full  Special tests: positive Carlos's    Xray reveals osteophytes.   No results found for any visits on 04/21/25.        Signed Electronically by: Rashmi Lebron PA-C    "

## 2025-04-22 ENCOUNTER — PATIENT OUTREACH (OUTPATIENT)
Dept: CARE COORDINATION | Facility: CLINIC | Age: 55
End: 2025-04-22
Payer: COMMERCIAL

## 2025-04-22 NOTE — PROGRESS NOTES
ASSESSMENT & PLAN    Pam was seen today for pain.    Diagnoses and all orders for this visit:    Acute pain of left knee  -     Orthopedic  Referral  -     MR Knee Left w/o Contrast; Future  -     naproxen (NAPROSYN) 500 MG tablet; Take 1 tablet (500 mg) by mouth 2 times daily (with meals).    Internal derangement of left knee  -     MR Knee Left w/o Contrast; Future  -     naproxen (NAPROSYN) 500 MG tablet; Take 1 tablet (500 mg) by mouth 2 times daily (with meals).      This issue is acute on chronic and Unchanged. Pam presents our clinic today to discuss her acute left knee pain.  We reviewed her previous radiographs which did show mild to moderate degenerative changes throughout the knee.  She reports chronic knee pain that was acutely worsened 2 weeks ago while walking, she is unclear if she had a twisting injury at that time.  Since then she has had significant crepitus and feelings of catching in the knee, as well as an episode where she states the knee did lock, feeling like there was an internal blockage preventing her from flexing or extending the knee.  She is able to move the knee to get it unlocked, and then rested the knee for about 2 hours afterwards.  On examination today, she does have  significant tenderness to palpation of both the medial and lateral joint line as well as pain and clicking present with Carlos's testing, these findings would be concerning for an underlying meniscus tear causing her mechanical symptoms.  We did discuss that while she may have an underlying meniscus tear, given her evidence of osteoarthritis on radiographs it is possible that her cartilage loss would be significant enough that an arthroscopic meniscus repair would not be an option for her.  We discussed obtaining advanced imaging to formally evaluate the cartilage of the meniscus versus beginning with conservative management in the form of a corticosteroid injection and physical therapy referral.   After this discussion, the patient wished to proceed with advanced imaging which is reasonable as there is a possibility she may be a candidate for surgical intervention depending on these findings.  We determined the following plan:  - MRI of the left knee ordered today  - Naprosyn 500 mg twice daily for 5 days, as needed afterwards up to pharmacy  - She will follow-up with me after her MRI is complete to determine the next steps in treatment    The longitudinal plan of care for the diagnosis(es)/condition(s) as documented were addressed during this visit. Due to the added complexity in care, I will continue to support Pam in the subsequent management and with ongoing continuity of care.      Brian Bravo [facility] [place]    -----  Chief Complaint   Patient presents with    Left Knee - Pain       SUBJECTIVE  Pam BRYSON Everette is a/an 54 year old female who is seen in consultation at the request of  Rashmi Aguilera PA-C for evaluation of left knee.     The patient is seen with their mother.      Onset: 2 week(s) ago. Patient describes injury as walking and and cracked and painful  Location of Pain: left anterior lateral and medial  Worsened by: stairs, bending  Better with: nothing  Treatments tried: heat, ibuprofen, casting/splinting/bracing, and x-ray 04/21/25  Associated symptoms: weakness of knee, locking or catching, and feeling of instability.  Reports an episode of mechanical locking of the left knee that caused severe pain    Orthopedic/Surgical history: YES - Date: NO ACL 14-15  years ago,plantar surgery  left foot 2024 surgery  Social History/Occupation: computer      REVIEW OF SYSTEMS:  Review of systems negative unless mentioned in HPI     OBJECTIVE:  LMP  (LMP Unknown)    General: healthy, alert and in no distress  Skin: no suspicious lesions or rash.  CV: distal perfusion intact   Resp: normal respiratory effort without conversational dyspnea    Psych: normal mood and affect  Gait: NORMAL  Neuro: Normal light sensory exam of LL extremity     Left Knee exam  Gait: Normal  Alignment:  [x] Normal  [] Anatomic valgus  [] Anatomic varus  Inspection: [x] Normal  [] Ecchymosis present []Other   Palpation:  Joint Tenderness: [] No Tenderness  [x] MJL [x] LJL [] MCL Margin [] LCL Margin [] Pes Anserine [] Distal Quadricep  [] Other   Peripatellar Tenderness: [] None [] Lateral pole [] Medial pole [] Superior pole [x] Inferior pole    Patellar tendon pain: [] None [x] Present    Patellar apprehension: [x] None [] Present    Patellar motion: [x] Normal [] Abnormal  Crepitus: [] None [x] Present  Effusion: [] None [x] Trace [] 1+ [] 2+ [] 3+  Range of motion:  Flexion: 135, Extension: 0  Strength:  [x] Full in all planes, including intact extensor mechanism [] Limited as described  Neurologic: Normal sensation   Vascular: Normal pulses   Special tests:   Lachman: Negative  Anterior Drawer: Negative  Sag/quad Activation: NP  Posterior Drawer: Negative  Valgus Stress: Negative at 0/30  Varus Stress: Negative at 0/30  Carlos's: POSITIVE  Thessaly: NP     Other notable findings/comments: None       RADIOLOGY:  Final results and radiologist's interpretation, available in the [facility] health record.  Images were reviewed with the patient in the office today.  My personal interpretation of the performed imaging: Radiographs from/21/25 reviewed and show mild osteophytosis of the medial and lateral compartment of the left knee with moderate joint space narrowing osteophytosis of the patellofemoral compartment.  No acute fractures or bony abnormalities.

## 2025-04-29 ENCOUNTER — OFFICE VISIT (OUTPATIENT)
Dept: ORTHOPEDICS | Facility: CLINIC | Age: 55
End: 2025-04-29
Attending: PHYSICIAN ASSISTANT
Payer: COMMERCIAL

## 2025-04-29 DIAGNOSIS — M25.562 ACUTE PAIN OF LEFT KNEE: Primary | ICD-10-CM

## 2025-04-29 DIAGNOSIS — M23.92 INTERNAL DERANGEMENT OF LEFT KNEE: ICD-10-CM

## 2025-04-29 PROCEDURE — 99204 OFFICE O/P NEW MOD 45 MIN: CPT | Performed by: STUDENT IN AN ORGANIZED HEALTH CARE EDUCATION/TRAINING PROGRAM

## 2025-04-29 PROCEDURE — G2211 COMPLEX E/M VISIT ADD ON: HCPCS | Performed by: STUDENT IN AN ORGANIZED HEALTH CARE EDUCATION/TRAINING PROGRAM

## 2025-04-29 RX ORDER — NAPROXEN 500 MG/1
500 TABLET ORAL 2 TIMES DAILY WITH MEALS
Qty: 60 TABLET | Refills: 0 | Status: SHIPPED | OUTPATIENT
Start: 2025-04-29

## 2025-04-29 NOTE — LETTER
4/29/2025      Pam Gaviria  [address]  [place] [address]      Dear Colleague,    Thank you for referring your patient, Pam Gaviria, to the [facility] [place]. Please see a copy of my visit note below.    ASSESSMENT & PLAN    Pam was seen today for pain.    Diagnoses and all orders for this visit:    Acute pain of left knee  -     Orthopedic  Referral  -     MR Knee Left w/o Contrast; Future  -     naproxen (NAPROSYN) 500 MG tablet; Take 1 tablet (500 mg) by mouth 2 times daily (with meals).    Internal derangement of left knee  -     MR Knee Left w/o Contrast; Future  -     naproxen (NAPROSYN) 500 MG tablet; Take 1 tablet (500 mg) by mouth 2 times daily (with meals).      This issue is acute on chronic and Unchanged. Pam presents our clinic today to discuss her acute left knee pain.  We reviewed her previous radiographs which did show mild to moderate degenerative changes throughout the knee.  She reports chronic knee pain that was acutely worsened 2 weeks ago while walking, she is unclear if she had a twisting injury at that time.  Since then she has had significant crepitus and feelings of catching in the knee, as well as an episode where she states the knee did lock, feeling like there was an internal blockage preventing her from flexing or extending the knee.  She is able to move the knee to get it unlocked, and then rested the knee for about 2 hours afterwards.  On examination today, she does have  significant tenderness to palpation of both the medial and lateral joint line as well as pain and clicking present with Carlos's testing, these findings would be concerning for an underlying meniscus tear causing her mechanical symptoms.  We did discuss that while she may have an underlying meniscus tear, given her evidence of osteoarthritis on radiographs it is possible that her cartilage loss would be significant enough that an arthroscopic  meniscus repair would not be an option for her.  We discussed obtaining advanced imaging to formally evaluate the cartilage of the meniscus versus beginning with conservative management in the form of a corticosteroid injection and physical therapy referral.  After this discussion, the patient wished to proceed with advanced imaging which is reasonable as there is a possibility she may be a candidate for surgical intervention depending on these findings.  We determined the following plan:  - MRI of the left knee ordered today  - Naprosyn 500 mg twice daily for 5 days, as needed afterwards up to pharmacy  - She will follow-up with me after her MRI is complete to determine the next steps in treatment    The longitudinal plan of care for the diagnosis(es)/condition(s) as documented were addressed during this visit. Due to the added complexity in care, I will continue to support Pam in the subsequent management and with ongoing continuity of care.      Brian Bravo [facility] [place]    -----  Chief Complaint   Patient presents with     Left Knee - Pain       SUBJECTIVE  Pam BRYSON Everette is a/an 54 year old female who is seen in consultation at the request of  Rashmi Aguilera PA-C for evaluation of left knee.     The patient is seen with their mother.      Onset: 2 week(s) ago. Patient describes injury as walking and and cracked and painful  Location of Pain: left anterior lateral and medial  Worsened by: stairs, bending  Better with: nothing  Treatments tried: heat, ibuprofen, casting/splinting/bracing, and x-ray 04/21/25  Associated symptoms: weakness of knee, locking or catching, and feeling of instability.  Reports an episode of mechanical locking of the left knee that caused severe pain    Orthopedic/Surgical history: YES - Date: NO ACL 14-15  years ago,plantar surgery  left foot 2024 surgery  Social History/Occupation: computer      REVIEW OF SYSTEMS:  Review  of systems negative unless mentioned in HPI     OBJECTIVE:  LMP  (LMP Unknown)    General: healthy, alert and in no distress  Skin: no suspicious lesions or rash.  CV: distal perfusion intact   Resp: normal respiratory effort without conversational dyspnea   Psych: normal mood and affect  Gait: NORMAL  Neuro: Normal light sensory exam of LL extremity     Left Knee exam  Gait: Normal  Alignment:  [x] Normal  [] Anatomic valgus  [] Anatomic varus  Inspection: [x] Normal  [] Ecchymosis present []Other   Palpation:  Joint Tenderness: [] No Tenderness  [x] MJL [x] LJL [] MCL Margin [] LCL Margin [] Pes Anserine [] Distal Quadricep  [] Other   Peripatellar Tenderness: [] None [] Lateral pole [] Medial pole [] Superior pole [x] Inferior pole    Patellar tendon pain: [] None [x] Present    Patellar apprehension: [x] None [] Present    Patellar motion: [x] Normal [] Abnormal  Crepitus: [] None [x] Present  Effusion: [] None [x] Trace [] 1+ [] 2+ [] 3+  Range of motion:  Flexion: 135, Extension: 0  Strength:  [x] Full in all planes, including intact extensor mechanism [] Limited as described  Neurologic: Normal sensation   Vascular: Normal pulses   Special tests:   Lachman: Negative  Anterior Drawer: Negative  Sag/quad Activation: NP  Posterior Drawer: Negative  Valgus Stress: Negative at 0/30  Varus Stress: Negative at 0/30  Carlos's: POSITIVE  Thessaly: NP     Other notable findings/comments: None       RADIOLOGY:  Final results and radiologist's interpretation, available in the [facility] health record.  Images were reviewed with the patient in the office today.  My personal interpretation of the performed imaging: Radiographs from/21/25 reviewed and show mild osteophytosis of the medial and lateral compartment of the left knee with moderate joint space narrowing osteophytosis of the patellofemoral compartment.  No acute fractures or bony abnormalities.        Again, thank you for allowing me to participate in the care of your  patient.        Sincerely,        Brian Bravo, DO    Electronically signed

## 2025-05-05 ENCOUNTER — TELEPHONE (OUTPATIENT)
Dept: PEDIATRICS | Facility: CLINIC | Age: 55
End: 2025-05-05

## 2025-05-05 NOTE — TELEPHONE ENCOUNTER
Prior Authorization Retail Medication Request    Medication/Dose: Semaglutide-Weight Management (WEGOVY) 1.7 MG/0.75ML pen  Diagnosis and ICD code (if different than what is on RX):  see chart  New/renewal/insurance change PA/secondary ins. PA:  Previously Tried and Failed:  see chart  Rationale:      Insurance   Primary: MEDICA   Insurance ID:  [id number]     Secondary (if applicable):[employer]   Insurance ID:  [id number]     Pharmacy Information (if different than what is on RX)  Name:    [facility] [address] [place] [address] [address]     Phone:    Fax:    Clinic Information  Preferred routing pool for dept communication:     Jessica Callaway MA on 5/5/2025 at 1:01 PM

## 2025-05-07 NOTE — TELEPHONE ENCOUNTER
PA Initiation    Medication: WEGOVY 1.7 MG/0.75ML SC SOAJ  Insurance Company: [employer] ([facility]) - Phone [phone] Fax [phone]  Pharmacy Filling the Rx: [facility] [address] [place] [address]  Filling Pharmacy Phone: [phone]  Filling Pharmacy Fax: [phone]  Start Date: 5/7/2025

## 2025-05-08 ENCOUNTER — MYC REFILL (OUTPATIENT)
Dept: PEDIATRICS | Facility: CLINIC | Age: 55
End: 2025-05-08
Payer: COMMERCIAL

## 2025-05-08 ENCOUNTER — HOSPITAL ENCOUNTER (OUTPATIENT)
Dept: MRI IMAGING | Facility: CLINIC | Age: 55
Discharge: HOME | End: 2025-05-08
Attending: STUDENT IN AN ORGANIZED HEALTH CARE EDUCATION/TRAINING PROGRAM
Payer: COMMERCIAL

## 2025-05-08 DIAGNOSIS — E66.01 MORBID OBESITY (H): ICD-10-CM

## 2025-05-08 DIAGNOSIS — M25.562 ACUTE PAIN OF LEFT KNEE: ICD-10-CM

## 2025-05-08 DIAGNOSIS — M23.92 INTERNAL DERANGEMENT OF LEFT KNEE: ICD-10-CM

## 2025-05-08 PROCEDURE — 73721 MRI JNT OF LWR EXTRE W/O DYE: CPT | Mod: LT

## 2025-05-08 NOTE — TELEPHONE ENCOUNTER
Clinic RN: Please investigate patient's chart or contact patient if the information cannot be found because the encounter is indicating need for prior authorization. Please change to a telephone call with pended medication and route to appropriate PA team.    Jo CARDENAS RN  [facility]

## 2025-05-09 NOTE — TELEPHONE ENCOUNTER
PRIOR AUTHORIZATION DENIED    Medication: WEGOVY 1.7 MG/0.75ML SC SOAJ  Insurance Company: [employer] ([facility]) - Phone [phone] Fax [phone]  Denial Date: 5/7/2025  Denial Reason(s):       Appeal Information:       Patient Notified: NO

## 2025-05-14 NOTE — PROGRESS NOTES
ASSESSMENT & PLAN    Pam was seen today for pain and follow up.    Diagnoses and all orders for this visit:    Complex tear of medial meniscus of left knee as current injury, initial encounter  -     Large Joint Injection/Arthocentesis: L knee joint  -     Physical Therapy  Referral; Future  -     Orthopedic  Referral; Future    Complex tear of lateral meniscus of left knee as current injury, initial encounter  -     Large Joint Injection/Arthocentesis: L knee joint  -     Physical Therapy  Referral; Future  -     Orthopedic  Referral; Future    Primary osteoarthritis of left knee  -     Large Joint Injection/Arthocentesis: L knee joint  -     Physical Therapy  Referral; Future  -     Orthopedic  Referral; Future    Old complete ACL tear, left  -     Large Joint Injection/Arthocentesis: L knee joint  -     Physical Therapy  Referral; Future  -     Orthopedic  Referral; Future      This issue is acute on chronic and Unchanged. Pam presents to our clinic today to follow-up on her acute on chronic left knee pain and MRI results.  We reviewed her MRI which showed complex tears of both the medial and lateral meniscus with meniscal flaps in both medial and lateral meniscus tears.  It also showed a chronic appearing ACL injury and chondromalacia of the knee, grade 4 in the patellofemoral compartment and grade 2 when the medial lateral compartment.  These findings would certainly be consistent with her episode of knee locking that occurred several weeks ago.  Discussed with the patient that while she does have meniscus tears that would predispose her to mechanical symptoms, given the cartilage loss in the medial, lateral compartment of the knee it is unlikely that a surgeon would perform an arthroscopic meniscus repair, and her only surgical option would potentially be a knee replacement .  We discussed that she could have a conversation with  the orthopedic surgery team prior to any further intervention to discuss her surgical options. The patient states she had similar MRI findings on the right knee in the past, and was told she would need a knee replacement, and does not wish to have  Surgery for at least 1 year as she is currently taking care of her father who was recovering from cancer treatment.  We discussed the initial steps in conservative management including physical therapy to help rehab the knee and a corticosteroid injection for pain relief, the patient was amenable to proceeding with this.  We determined the following plan:  - CSI to the left knee performed today under ultrasound guidance, see procedure note below for details  -Physical therapy referral placed  -Referral to orthopedic surgery team placed, patient can schedule this at her convenience   -She can follow up with me in 6-8 weeks if not improving for further evaluation and treatment.       Brian Bravo [facility] [place]    SUBJECTIVE- Interim History May 14, 2025    Chief Complaint   Patient presents with    Left Knee - Pain, Follow Up       Pam Gaviria is a 54 year old female who is seen in f/u up for MRI results of left knee. Since last visit on 04/29/2025  patient has had pain sometimes depending what she is doing example walking .  - Now ~  5 weeks from initial onset    Worsened by: walking, sitting  Better with: nothing  Treatments tried: ice, ibuprofen, previous imaging (MRI 05/08/2025 and xray 04/21/2025), and   Associated symptoms:  weakness of knee, locking or catching, and feeling of instability and naproxen.    The patient is seen with their mother.      Orthopedic/Surgical history: YES - Date: ES - Date: NO ACL 14-15  years ago,plantar surgery  left foot 2024 surgery   Social History/Occupation: computer      REVIEW OF SYSTEMS:  Negative unless mentioned above    OBJECTIVE:  LMP  (LMP Unknown)    General: healthy, alert  and in no distress  Skin: no suspicious lesions or rash.  CV: distal perfusion intact   Resp: normal respiratory effort without conversational dyspnea   Psych: normal mood and affect  Gait: NORMAL  Neuro: Normal light sensory exam of LL extremity     RADIOLOGY:  Final results and radiologist's interpretation, available in the [facility] health record.  Images were reviewed with the patient in the office today.    Results for orders placed or performed during the hospital encounter of 05/08/25   MR Knee Left w/o Contrast    Narrative    MR left knee without contrast 5/9/2025 10:07 AM    Techniques: Multiplanar multisequence imaging of the left knee was  obtained without administration of intra-articular or intravenous  contrast using routing protocol.    History: L knee pain, positive mcmurrays testing and medial joint line  palpation, hx of mechanical locking of knee, concern for meniscus  tear.; Acute pain of left knee; Internal derangement of left knee    Comparison: Radiograph 4/21/2025    Findings:    MENISCI:  Medial meniscus: Predominantly horizontal tear of the medial meniscus  body and posterior horn with 5 mm meniscal flap in the medial femoral  gutter and peripheral extrusion of the meniscal body.  Lateral meniscus: Predominantly horizontal tear of the lateral  meniscus body and anterior horn with marked truncation of the anterior  horn with abnormal signal extension into the anterior root ligament,  and 12 mm meniscal flap in the lateral femoral gutter.    LIGAMENTS  Cruciate ligaments: Essentially complete/near-complete tear of the  anterior cruciate ligament with tiny strand of residual fiber, and  diffuse intermediate signal of remaining fibers without associated  bone contusion pattern. No anterior translation. Posterior cruciate  ligament is intact.  Medial supporting structures: Peripheral bowing of the tibial  collateral ligament, moderate sprain meniscofemoral and meniscotibial  ligaments are likely  secondary to underlying meniscal pathology.  Lateral supporting structures: Intact. Popliteus tendinosis.    EXTENSOR MECHANISM  Intact. Distal patellar tendinosis.    FLUID  Trace joint effusion. 12 mm and 7 mm mediolateral dimension  hypointense, radiographically osteophyte joint body posterior  medially, likely embedded within the posterior parameniscal  fat/synovial lining. Small Baker's cyst.     A multiloculated/septated cystic-appearing mass measuring  approximately 3.2 cm in craniocaudal dimension extending along the  medial margin of the patellar tendon, communicating with deep  infrapatellar bursa, consistent with ganglion/synovial cyst.     A multiloculated/septated cystic-appearing mass measuring  approximately 3.2 cm in mediolateral dimension intermittent with  lateral head of gastrocnemius proximal attachment, consistent with  additional area of ganglion/synovial cyst with a mild pericystic  edema.    OSSEOUS and ARTICULAR STRUCTURES  Bones: No fracture, contusion, or osseous lesion is seen.  Osteophytosis. Subcortical cystlike changes at the anterior crucial  ligament proximal and distal attachments.    Patellofemoral compartment: Grade IV chondromalacia with  full-thickness chondral loss with subchondral edema, marrow signal  intensity at the lateral trochlea. Diffuse moderate to high-grade  chondral loss in the patellar articular cartilage.    Medial compartment: Grade II chondromalacia with low-grade chondral  loss at the central weightbearing surface of medial condyle.    Lateral compartment: Grade II chondromalacia with low-grade chondral  loss of the lateral femoral condyle central weightbearing surface.    ANCILLARY FINDINGS  Anatomic variation with high origin of the anterior tibial artery,  which courses deep to popliteus muscle belly. Patchy muscle edema  especially posterior compartment calf muscles, quality low-grade  muscle strain. Mild nonspecific focal subcutaneous edema  anteriorly  over the tibial tuberosity area.      Impression    Impression:  1. Predominantly horizontal tear of the medial meniscus body and  posterior horn with 5 mm meniscal flap in the medial femoral gutter  and peripheral extrusion of the meniscal body.  2. Predominantly horizontal tear of the lateral meniscus body and  anterior horn with marked truncation of the anterior horn with  abnormal signal extension into the anterior root ligament, and 12 mm  meniscal flap in the lateral femoral gutter.  3. Essentially complete/near-complete tear of the anterior cruciate  ligament with tiny strand of residual fiber, possibly chronic.  *  No associated bone contusion pattern.   *  No anterior translation.   4. Ossified hypointense joint bodies posteromedially, likely embedded  within synovium/posterior parameniscal fat.  5. Tricompartmental chondromalacia with grade 4 change in  patellofemoral and grade 2 in medial and lateral compartments.  6. Ganglion/synovial cysts arising at the lateral head of  gastrocnemius attachment and deep infrapatellar bursa.  7. Anatomic variation with high origin of the anterior tibial artery,  which courses deep to popliteus muscle belly.     [employer]         SYSTEM ID:  [id number]     Large Joint Injection/Arthocentesis: L knee joint    Date/Time: 5/20/2025 4:11 PM    Performed by: Brian Bravo DO  Authorized by: Brian Bravo DO    Indications:  Pain and osteoarthritis  Needle Size:  22 G  Guidance: ultrasound    Approach:  Superolateral  Location:  Knee      Medications:  6 mg betamethasone acet & sod phos 6 (3-3) MG/ML; 5 mL lidocaine 1 %; 2 mL ROPivacaine 5 MG/ML  Medications comment:  1ml of 8.4% Sodium Bicarbonate solution was used to buffer the local numbing agent for today's injection    Outcome:  Tolerated well, no immediate complications  Procedure discussed: discussed risks, benefits, and alternatives    Consent Given by:  Patient  Timeout: timeout called  immediately prior to procedure    Prep: patient was prepped and draped in usual sterile fashion     Ultrasound was used to ensure safe and accurate needle placement and injection. Ultrasound images of the procedure were permanently stored.

## 2025-05-20 ENCOUNTER — OFFICE VISIT (OUTPATIENT)
Dept: ORTHOPEDICS | Facility: CLINIC | Age: 55
End: 2025-05-20
Payer: COMMERCIAL

## 2025-05-20 DIAGNOSIS — M23.52 OLD COMPLETE ACL TEAR, LEFT: ICD-10-CM

## 2025-05-20 DIAGNOSIS — M17.12 PRIMARY OSTEOARTHRITIS OF LEFT KNEE: ICD-10-CM

## 2025-05-20 DIAGNOSIS — S83.272A COMPLEX TEAR OF LATERAL MENISCUS OF LEFT KNEE AS CURRENT INJURY, INITIAL ENCOUNTER: ICD-10-CM

## 2025-05-20 DIAGNOSIS — S83.232A COMPLEX TEAR OF MEDIAL MENISCUS OF LEFT KNEE AS CURRENT INJURY, INITIAL ENCOUNTER: Primary | ICD-10-CM

## 2025-05-20 PROCEDURE — 99214 OFFICE O/P EST MOD 30 MIN: CPT | Mod: 25 | Performed by: STUDENT IN AN ORGANIZED HEALTH CARE EDUCATION/TRAINING PROGRAM

## 2025-05-20 PROCEDURE — 20611 DRAIN/INJ JOINT/BURSA W/US: CPT | Mod: LT | Performed by: STUDENT IN AN ORGANIZED HEALTH CARE EDUCATION/TRAINING PROGRAM

## 2025-05-20 RX ORDER — BETAMETHASONE SODIUM PHOSPHATE AND BETAMETHASONE ACETATE 3; 3 MG/ML; MG/ML
6 INJECTION, SUSPENSION INTRA-ARTICULAR; INTRALESIONAL; INTRAMUSCULAR; SOFT TISSUE
Status: COMPLETED | OUTPATIENT
Start: 2025-05-20 | End: 2025-05-20

## 2025-05-20 RX ORDER — LIDOCAINE HYDROCHLORIDE 10 MG/ML
5 INJECTION, SOLUTION INFILTRATION; PERINEURAL
Status: COMPLETED | OUTPATIENT
Start: 2025-05-20 | End: 2025-05-20

## 2025-05-20 RX ORDER — ROPIVACAINE HYDROCHLORIDE 5 MG/ML
2 INJECTION, SOLUTION EPIDURAL; INFILTRATION; PERINEURAL
Status: COMPLETED | OUTPATIENT
Start: 2025-05-20 | End: 2025-05-20

## 2025-05-20 RX ADMIN — LIDOCAINE HYDROCHLORIDE 5 ML: 10 INJECTION, SOLUTION INFILTRATION; PERINEURAL at 16:11

## 2025-05-20 RX ADMIN — BETAMETHASONE SODIUM PHOSPHATE AND BETAMETHASONE ACETATE 6 MG: 3; 3 INJECTION, SUSPENSION INTRA-ARTICULAR; INTRALESIONAL; INTRAMUSCULAR; SOFT TISSUE at 16:11

## 2025-05-20 RX ADMIN — ROPIVACAINE HYDROCHLORIDE 2 ML: 5 INJECTION, SOLUTION EPIDURAL; INFILTRATION; PERINEURAL at 16:11

## 2025-05-20 NOTE — LETTER
5/20/2025      Pam Gaviria  [address]  [place] [address]      Dear Colleague,    Thank you for referring your patient, Pam Gaviria, to the [facility] [place]. Please see a copy of my visit note below.    ASSESSMENT & PLAN    Pam was seen today for pain and follow up.    Diagnoses and all orders for this visit:    Complex tear of medial meniscus of left knee as current injury, initial encounter  -     Large Joint Injection/Arthocentesis: L knee joint  -     Physical Therapy  Referral; Future  -     Orthopedic  Referral; Future    Complex tear of lateral meniscus of left knee as current injury, initial encounter  -     Large Joint Injection/Arthocentesis: L knee joint  -     Physical Therapy  Referral; Future  -     Orthopedic  Referral; Future    Primary osteoarthritis of left knee  -     Large Joint Injection/Arthocentesis: L knee joint  -     Physical Therapy  Referral; Future  -     Orthopedic  Referral; Future    Old complete ACL tear, left  -     Large Joint Injection/Arthocentesis: L knee joint  -     Physical Therapy  Referral; Future  -     Orthopedic  Referral; Future      This issue is acute on chronic and Unchanged. Pam presents to our clinic today to follow-up on her acute on chronic left knee pain and MRI results.  We reviewed her MRI which showed complex tears of both the medial and lateral meniscus with meniscal flaps in both medial and lateral meniscus tears.  It also showed a chronic appearing ACL injury and chondromalacia of the knee, grade 4 in the patellofemoral compartment and grade 2 when the medial lateral compartment.  These findings would certainly be consistent with her episode of knee locking that occurred several weeks ago.  Discussed with the patient that while she does have meniscus tears that would predispose her to mechanical symptoms, given the  cartilage loss in the medial, lateral compartment of the knee it is unlikely that a surgeon would perform an arthroscopic meniscus repair, and her only surgical option would potentially be a knee replacement .  We discussed that she could have a conversation with the orthopedic surgery team prior to any further intervention to discuss her surgical options. The patient states she had similar MRI findings on the right knee in the past, and was told she would need a knee replacement, and does not wish to have  Surgery for at least 1 year as she is currently taking care of her father who was recovering from cancer treatment.  We discussed the initial steps in conservative management including physical therapy to help rehab the knee and a corticosteroid injection for pain relief, the patient was amenable to proceeding with this.  We determined the following plan:  - CSI to the left knee performed today under ultrasound guidance, see procedure note below for details  -Physical therapy referral placed  -Referral to orthopedic surgery team placed, patient can schedule this at her convenience   -She can follow up with me in 6-8 weeks if not improving for further evaluation and treatment.       Brian Bravo, [facility] [place]    SUBJECTIVE- Interim History May 14, 2025    Chief Complaint   Patient presents with     Left Knee - Pain, Follow Up       Pam Gaviria is a 54 year old female who is seen in f/u up for MRI results of left knee. Since last visit on 04/29/2025  patient has had pain sometimes depending what she is doing example walking .  - Now ~  5 weeks from initial onset    Worsened by: walking, sitting  Better with: nothing  Treatments tried: ice, ibuprofen, previous imaging (MRI 05/08/2025 and xray 04/21/2025), and   Associated symptoms:  weakness of knee, locking or catching, and feeling of instability and naproxen.    The patient is seen with their  mother.      Orthopedic/Surgical history: YES - Date: ES - Date: NO ACL 14-15  years ago,plantar surgery  left foot 2024 surgery   Social History/Occupation: computer      REVIEW OF SYSTEMS:  Negative unless mentioned above    OBJECTIVE:  LMP  (LMP Unknown)    General: healthy, alert and in no distress  Skin: no suspicious lesions or rash.  CV: distal perfusion intact   Resp: normal respiratory effort without conversational dyspnea   Psych: normal mood and affect  Gait: NORMAL  Neuro: Normal light sensory exam of LL extremity     RADIOLOGY:  Final results and radiologist's interpretation, available in the [facility] health record.  Images were reviewed with the patient in the office today.    Results for orders placed or performed during the hospital encounter of 05/08/25   MR Knee Left w/o Contrast    Narrative    MR left knee without contrast 5/9/2025 10:07 AM    Techniques: Multiplanar multisequence imaging of the left knee was  obtained without administration of intra-articular or intravenous  contrast using routing protocol.    History: L knee pain, positive mcmurrays testing and medial joint line  palpation, hx of mechanical locking of knee, concern for meniscus  tear.; Acute pain of left knee; Internal derangement of left knee    Comparison: Radiograph 4/21/2025    Findings:    MENISCI:  Medial meniscus: Predominantly horizontal tear of the medial meniscus  body and posterior horn with 5 mm meniscal flap in the medial femoral  gutter and peripheral extrusion of the meniscal body.  Lateral meniscus: Predominantly horizontal tear of the lateral  meniscus body and anterior horn with marked truncation of the anterior  horn with abnormal signal extension into the anterior root ligament,  and 12 mm meniscal flap in the lateral femoral gutter.    LIGAMENTS  Cruciate ligaments: Essentially complete/near-complete tear of the  anterior cruciate ligament with tiny strand of residual fiber, and  diffuse intermediate signal of  remaining fibers without associated  bone contusion pattern. No anterior translation. Posterior cruciate  ligament is intact.  Medial supporting structures: Peripheral bowing of the tibial  collateral ligament, moderate sprain meniscofemoral and meniscotibial  ligaments are likely secondary to underlying meniscal pathology.  Lateral supporting structures: Intact. Popliteus tendinosis.    EXTENSOR MECHANISM  Intact. Distal patellar tendinosis.    FLUID  Trace joint effusion. 12 mm and 7 mm mediolateral dimension  hypointense, radiographically osteophyte joint body posterior  medially, likely embedded within the posterior parameniscal  fat/synovial lining. Small Baker's cyst.     A multiloculated/septated cystic-appearing mass measuring  approximately 3.2 cm in craniocaudal dimension extending along the  medial margin of the patellar tendon, communicating with deep  infrapatellar bursa, consistent with ganglion/synovial cyst.     A multiloculated/septated cystic-appearing mass measuring  approximately 3.2 cm in mediolateral dimension intermittent with  lateral head of gastrocnemius proximal attachment, consistent with  additional area of ganglion/synovial cyst with a mild pericystic  edema.    OSSEOUS and ARTICULAR STRUCTURES  Bones: No fracture, contusion, or osseous lesion is seen.  Osteophytosis. Subcortical cystlike changes at the anterior crucial  ligament proximal and distal attachments.    Patellofemoral compartment: Grade IV chondromalacia with  full-thickness chondral loss with subchondral edema, marrow signal  intensity at the lateral trochlea. Diffuse moderate to high-grade  chondral loss in the patellar articular cartilage.    Medial compartment: Grade II chondromalacia with low-grade chondral  loss at the central weightbearing surface of medial condyle.    Lateral compartment: Grade II chondromalacia with low-grade chondral  loss of the lateral femoral condyle central weightbearing surface.    ANCILLARY  FINDINGS  Anatomic variation with high origin of the anterior tibial artery,  which courses deep to popliteus muscle belly. Patchy muscle edema  especially posterior compartment calf muscles, quality low-grade  muscle strain. Mild nonspecific focal subcutaneous edema anteriorly  over the tibial tuberosity area.      Impression    Impression:  1. Predominantly horizontal tear of the medial meniscus body and  posterior horn with 5 mm meniscal flap in the medial femoral gutter  and peripheral extrusion of the meniscal body.  2. Predominantly horizontal tear of the lateral meniscus body and  anterior horn with marked truncation of the anterior horn with  abnormal signal extension into the anterior root ligament, and 12 mm  meniscal flap in the lateral femoral gutter.  3. Essentially complete/near-complete tear of the anterior cruciate  ligament with tiny strand of residual fiber, possibly chronic.  *  No associated bone contusion pattern.   *  No anterior translation.   4. Ossified hypointense joint bodies posteromedially, likely embedded  within synovium/posterior parameniscal fat.  5. Tricompartmental chondromalacia with grade 4 change in  patellofemoral and grade 2 in medial and lateral compartments.  6. Ganglion/synovial cysts arising at the lateral head of  gastrocnemius attachment and deep infrapatellar bursa.  7. Anatomic variation with high origin of the anterior tibial artery,  which courses deep to popliteus muscle belly.     [employer]         SYSTEM ID:  [id number]     Large Joint Injection/Arthocentesis: L knee joint    Date/Time: 5/20/2025 4:11 PM    Performed by: Brian Bravo DO  Authorized by: Brian Bravo DO    Indications:  Pain and osteoarthritis  Needle Size:  22 G  Guidance: ultrasound    Approach:  Superolateral  Location:  Knee      Medications:  6 mg betamethasone acet & sod phos 6 (3-3) MG/ML; 5 mL lidocaine 1 %; 2 mL ROPivacaine 5 MG/ML  Medications comment:  1ml of 8.4% Sodium  Bicarbonate solution was used to buffer the local numbing agent for today's injection    Outcome:  Tolerated well, no immediate complications  Procedure discussed: discussed risks, benefits, and alternatives    Consent Given by:  Patient  Timeout: timeout called immediately prior to procedure    Prep: patient was prepped and draped in usual sterile fashion     Ultrasound was used to ensure safe and accurate needle placement and injection. Ultrasound images of the procedure were permanently stored.               Again, thank you for allowing me to participate in the care of your patient.        Sincerely,        Brian Bravo DO    Electronically signed

## 2025-05-21 ENCOUNTER — PATIENT OUTREACH (OUTPATIENT)
Dept: CARE COORDINATION | Facility: CLINIC | Age: 55
End: 2025-05-21
Payer: COMMERCIAL

## 2025-05-29 ENCOUNTER — TELEPHONE (OUTPATIENT)
Dept: PEDIATRICS | Facility: CLINIC | Age: 55
End: 2025-05-29
Payer: COMMERCIAL

## 2025-05-29 DIAGNOSIS — E66.01 MORBID OBESITY (H): ICD-10-CM

## 2025-05-29 NOTE — TELEPHONE ENCOUNTER
Prior Authorization Retail Medication Request    Medication/Dose: semaglutide-weight management (WEGOVY) 0.25 MG/0.5ML pen  Diagnosis and ICD code (if different than what is on RX):    New/renewal/insurance change PA/secondary ins. PA:  Previously Tried and Failed:    Rationale:      Insurance   Primary: [facility] [id number]  Insurance ID:  [id number]    Secondary (if applicable):  Insurance ID:      Pharmacy Information (if different than what is on RX)  Name:  [facility]  Phone:    Fax:    Clinic Information  Preferred routing pool for dept communication:

## 2025-06-02 NOTE — TELEPHONE ENCOUNTER
Milana Starr to [facility] (Selected Message)  AT    6/2/25 11:12 AM  DAILY Osuna for this medication was submitted and denied in encounter dated 5/5/25 due to plan exclusion. Since it's an excluded drug, an appeal most like won't change the outcome. If you would like to appeal please provide a letter of medical necessity with clinical reason and route the original encounter back to the PA team. Thank you.  Silvina Rodriguez Anh N  LH    5/31/25  8:13 AM  Denial on file for other strength due to plan exclusion

## 2025-06-03 ENCOUNTER — OFFICE VISIT (OUTPATIENT)
Dept: ORTHOPEDICS | Facility: CLINIC | Age: 55
End: 2025-06-03
Payer: COMMERCIAL

## 2025-06-03 ENCOUNTER — ANCILLARY PROCEDURE (OUTPATIENT)
Dept: GENERAL RADIOLOGY | Facility: CLINIC | Age: 55
End: 2025-06-03
Attending: STUDENT IN AN ORGANIZED HEALTH CARE EDUCATION/TRAINING PROGRAM
Payer: COMMERCIAL

## 2025-06-03 DIAGNOSIS — M17.11 PRIMARY OSTEOARTHRITIS OF RIGHT KNEE: Primary | ICD-10-CM

## 2025-06-03 DIAGNOSIS — G89.29 CHRONIC PAIN OF RIGHT KNEE: ICD-10-CM

## 2025-06-03 DIAGNOSIS — M25.561 CHRONIC PAIN OF RIGHT KNEE: ICD-10-CM

## 2025-06-03 PROCEDURE — 99214 OFFICE O/P EST MOD 30 MIN: CPT | Mod: 25 | Performed by: STUDENT IN AN ORGANIZED HEALTH CARE EDUCATION/TRAINING PROGRAM

## 2025-06-03 PROCEDURE — 20611 DRAIN/INJ JOINT/BURSA W/US: CPT | Mod: RT | Performed by: STUDENT IN AN ORGANIZED HEALTH CARE EDUCATION/TRAINING PROGRAM

## 2025-06-03 PROCEDURE — 73562 X-RAY EXAM OF KNEE 3: CPT | Mod: TC | Performed by: RADIOLOGY

## 2025-06-03 RX ADMIN — BETAMETHASONE SODIUM PHOSPHATE AND BETAMETHASONE ACETATE 6 MG: 3; 3 INJECTION, SUSPENSION INTRA-ARTICULAR; INTRALESIONAL; INTRAMUSCULAR; SOFT TISSUE at 16:41

## 2025-06-03 RX ADMIN — LIDOCAINE HYDROCHLORIDE 5 ML: 10 INJECTION, SOLUTION INFILTRATION; PERINEURAL at 16:41

## 2025-06-03 RX ADMIN — ROPIVACAINE HYDROCHLORIDE 2 ML: 5 INJECTION, SOLUTION EPIDURAL; INFILTRATION; PERINEURAL at 16:41

## 2025-06-03 NOTE — TELEPHONE ENCOUNTER
1st attempt: sent [employer] message to patient.    Thank you kindly,  Jovon MASSEY

## 2025-06-03 NOTE — LETTER
6/3/2025      Pam Gaviria  [address]  [place] [address]      Dear Colleague,    Thank you for referring your patient, Pam Gaviria, to the [facility]. Please see a copy of my visit note below.    ASSESSMENT & PLAN    Pam was seen today for pain.    Diagnoses and all orders for this visit:    Primary osteoarthritis of right knee  -     XR Knee Right 3 Views; Future  -     Physical Therapy  Referral; Future  -     Large Joint Injection/Arthocentesis: R knee joint      This issue is chronic and Unchanged. Pam presents our clinic today to discuss her chronic right knee pain.  Her history, exam findings, and imaging findings are consistent with a radiographically advanced medial and patellofemoral compartment osteoarthritis as well as mild to moderate lateral compartment osteoarthritis is the main  of her symptoms.  We discussed these findings and the treatment options including anti-inflammatory medicines, physical therapy, corticosteroid injections, hyaluronic acid injections, and surgical intervention in the form of a knee replacement.  The patient previously got good response to corticosteroid injection in the left knee, she would like to trial this again today.  She also has PT scheduled to begin strengthening both knees.  We determined the following plan:  - CSI to the right knee performed today under ultrasound guidance, see procedure note below for details  - Physical therapy referral placed so she can have both knees addressed at her upcoming PT appointment  - She can follow-up with me in 6 to 8 weeks for further evaluation and treatment if not improving    Brian Bravo,   [facility]    -----  Chief Complaint   Patient presents with     Right Knee - Pain       SUBJECTIVE  Pam Gaviria is a/an 54 year old female who is seen as a self referral for evaluation of right knee.     The  patient is seen with their mother.    Onset: 2 years(s) ago. Patient describes injury as she was walking down stairs in a pool when she missed the last step and landed really hard on the right foot / leg. She felt a pop in her right knee.  Location of Pain: right medial and posterior knee  Worsened by: stairs, walking  Better with: rest  Treatments tried: rest/activity avoidance, heat, ibuprofen, and previous imaging (MRI 1/8/22 and xray 1/5/22)  Associated symptoms: tightness, swelling and locking or catching, feeling of instability    Orthopedic/Surgical history: NO  Social History/Occupation: computer work      REVIEW OF SYSTEMS:  Review of systems negative unless mentioned in HPI     OBJECTIVE:  LMP  (LMP Unknown)    General: healthy, alert and in no distress  Skin: no suspicious lesions or rash.  CV: distal perfusion intact   Resp: normal respiratory effort without conversational dyspnea   Psych: normal mood and affect  Gait: NORMAL  Neuro: Normal light sensory exam of RL extremity     Right Knee exam  Gait: Normal  Alignment:  [x] Normal  [] Anatomic valgus  [] Anatomic varus  Inspection: [x] Normal  [] Ecchymosis present []Other   Palpation:  Joint Tenderness: [x] No Tenderness  [x] MJL [] LJL [] MCL Margin [] LCL Margin [] Pes Anserine [] Distal Quadricep  [] Other   Peripatellar Tenderness: [] None [x] Lateral pole [x] Medial pole [x] Superior pole [] Inferior pole    Patellar tendon pain: [x] None [] Present    Patellar apprehension: [x] None [] Present    Patellar motion: [x] Normal [] Abnormal  Crepitus: [x] None [] Present  Effusion: [x] None [] Trace [] 1+ [] 2+ [] 3+  Range of motion:  Flexion: 135, Extension: 0  Strength:  [x] Full in all planes, including intact extensor mechanism [] Limited as described  Neurologic: Normal sensation   Vascular: Normal pulses   Special tests:   Lachman: Negative  Anterior Drawer: Negative  Sag/quad Activation: NP  Posterior Drawer: Negative  Valgus Stress: Negative  at 0/30  Varus Stress: Negative at 0/30  Carlos's: painful   Thessaly: NP     Other notable findings/comments: None       RADIOLOGY:  Final results and radiologist's interpretation, available in the [facility] health record.  Images were reviewed with the patient in the office today.  My personal interpretation of the performed imaging: Advanced degenerative changes in the medial and patellofemoral compartment where there is near bone-on-bone articulation and osteophytosis.  There is mild to moderate osteophytosis of the lateral compartment.  No acute fractures or bony abnormalities.    Large Joint Injection/Arthocentesis: R knee joint    Date/Time: 6/3/2025 4:41 PM    Performed by: Brian Bravo DO  Authorized by: Brian Bravo DO    Indications:  Pain and osteoarthritis  Needle Size:  22 G  Guidance: ultrasound    Approach:  Superolateral  Location:  Knee      Medications:  6 mg betamethasone acet & sod phos 6 (3-3) MG/ML; 5 mL lidocaine 1 %; 2 mL ROPivacaine 5 MG/ML  Medications comment:  1ml of 8.4% Sodium Bicarbonate solution was used to buffer the local numbing agent for today's injection    Outcome:  Tolerated well, no immediate complications  Procedure discussed: discussed risks, benefits, and alternatives    Consent Given by:  Patient  Timeout: timeout called immediately prior to procedure    Prep: patient was prepped and draped in usual sterile fashion     Ultrasound was used to ensure safe and accurate needle placement and injection. Ultrasound images of the procedure were permanently stored.               Again, thank you for allowing me to participate in the care of your patient.        Sincerely,        Brian Bravo DO    Electronically signed

## 2025-06-03 NOTE — PROGRESS NOTES
ASSESSMENT & PLAN    Pam was seen today for pain.    Diagnoses and all orders for this visit:    Primary osteoarthritis of right knee  -     XR Knee Right 3 Views; Future  -     Physical Therapy  Referral; Future  -     Large Joint Injection/Arthocentesis: R knee joint      This issue is chronic and Unchanged. Pam presents our clinic today to discuss her chronic right knee pain.  Her history, exam findings, and imaging findings are consistent with a radiographically advanced medial and patellofemoral compartment osteoarthritis as well as mild to moderate lateral compartment osteoarthritis is the main  of her symptoms.  We discussed these findings and the treatment options including anti-inflammatory medicines, physical therapy, corticosteroid injections, hyaluronic acid injections, and surgical intervention in the form of a knee replacement.  The patient previously got good response to corticosteroid injection in the left knee, she would like to trial this again today.  She also has PT scheduled to begin strengthening both knees.  We determined the following plan:  - CSI to the right knee performed today under ultrasound guidance, see procedure note below for details  - Physical therapy referral placed so she can have both knees addressed at her upcoming PT appointment  - She can follow-up with me in 6 to 8 weeks for further evaluation and treatment if not improving    Brian Bravo [facility] [place]    -----  Chief Complaint   Patient presents with    Right Knee - Pain       SUBJECTIVE  Pam Gaviria is a/an 54 year old female who is seen as a self referral for evaluation of right knee.     The patient is seen with their mother.    Onset: 2 years(s) ago. Patient describes injury as she was walking down stairs in a pool when she missed the last step and landed really hard on the right foot / leg. She felt a pop in her right knee.  Location of Pain:  right medial and posterior knee  Worsened by: stairs, walking  Better with: rest  Treatments tried: rest/activity avoidance, heat, ibuprofen, and previous imaging (MRI 1/8/22 and xray 1/5/22)  Associated symptoms: tightness, swelling and locking or catching, feeling of instability    Orthopedic/Surgical history: NO  Social History/Occupation: computer work      REVIEW OF SYSTEMS:  Review of systems negative unless mentioned in HPI     OBJECTIVE:  LMP  (LMP Unknown)    General: healthy, alert and in no distress  Skin: no suspicious lesions or rash.  CV: distal perfusion intact   Resp: normal respiratory effort without conversational dyspnea   Psych: normal mood and affect  Gait: NORMAL  Neuro: Normal light sensory exam of RL extremity     Right Knee exam  Gait: Normal  Alignment:  [x] Normal  [] Anatomic valgus  [] Anatomic varus  Inspection: [x] Normal  [] Ecchymosis present []Other   Palpation:  Joint Tenderness: [x] No Tenderness  [x] MJL [] LJL [] MCL Margin [] LCL Margin [] Pes Anserine [] Distal Quadricep  [] Other   Peripatellar Tenderness: [] None [x] Lateral pole [x] Medial pole [x] Superior pole [] Inferior pole    Patellar tendon pain: [x] None [] Present    Patellar apprehension: [x] None [] Present    Patellar motion: [x] Normal [] Abnormal  Crepitus: [x] None [] Present  Effusion: [x] None [] Trace [] 1+ [] 2+ [] 3+  Range of motion:  Flexion: 135, Extension: 0  Strength:  [x] Full in all planes, including intact extensor mechanism [] Limited as described  Neurologic: Normal sensation   Vascular: Normal pulses   Special tests:   Lachman: Negative  Anterior Drawer: Negative  Sag/quad Activation: NP  Posterior Drawer: Negative  Valgus Stress: Negative at 0/30  Varus Stress: Negative at 0/30  Carlos's: painful   Thessaly: NP     Other notable findings/comments: None       RADIOLOGY:  Final results and radiologist's interpretation, available in the [facility] health record.  Images were reviewed with the  patient in the office today.  My personal interpretation of the performed imaging: Advanced degenerative changes in the medial and patellofemoral compartment where there is near bone-on-bone articulation and osteophytosis.  There is mild to moderate osteophytosis of the lateral compartment.  No acute fractures or bony abnormalities.    Large Joint Injection/Arthocentesis: R knee joint    Date/Time: 6/3/2025 4:41 PM    Performed by: Brian Bravo DO  Authorized by: Brian Bravo DO    Indications:  Pain and osteoarthritis  Needle Size:  22 G  Guidance: ultrasound    Approach:  Superolateral  Location:  Knee      Medications:  6 mg betamethasone acet & sod phos 6 (3-3) MG/ML; 5 mL lidocaine 1 %; 2 mL ROPivacaine 5 MG/ML  Medications comment:  1ml of 8.4% Sodium Bicarbonate solution was used to buffer the local numbing agent for today's injection    Outcome:  Tolerated well, no immediate complications  Procedure discussed: discussed risks, benefits, and alternatives    Consent Given by:  Patient  Timeout: timeout called immediately prior to procedure    Prep: patient was prepped and draped in usual sterile fashion     Ultrasound was used to ensure safe and accurate needle placement and injection. Ultrasound images of the procedure were permanently stored.

## 2025-06-03 NOTE — TELEPHONE ENCOUNTER
Please schedule virtual weight follow-up. PA denied. Need follow-up to hear how it's going on sublingual semaglutide. Ok to use yair, acute, etc. Also ok to use a 1pm personal slot for this one

## 2025-06-04 RX ORDER — LIDOCAINE HYDROCHLORIDE 10 MG/ML
5 INJECTION, SOLUTION INFILTRATION; PERINEURAL
Status: COMPLETED | OUTPATIENT
Start: 2025-06-03 | End: 2025-06-03

## 2025-06-04 RX ORDER — BETAMETHASONE SODIUM PHOSPHATE AND BETAMETHASONE ACETATE 3; 3 MG/ML; MG/ML
6 INJECTION, SUSPENSION INTRA-ARTICULAR; INTRALESIONAL; INTRAMUSCULAR; SOFT TISSUE
Status: COMPLETED | OUTPATIENT
Start: 2025-06-03 | End: 2025-06-03

## 2025-06-04 RX ORDER — ROPIVACAINE HYDROCHLORIDE 5 MG/ML
2 INJECTION, SOLUTION EPIDURAL; INFILTRATION; PERINEURAL
Status: COMPLETED | OUTPATIENT
Start: 2025-06-03 | End: 2025-06-03

## 2025-06-05 ENCOUNTER — VIRTUAL VISIT (OUTPATIENT)
Dept: PEDIATRICS | Facility: CLINIC | Age: 55
End: 2025-06-05
Payer: COMMERCIAL

## 2025-06-05 DIAGNOSIS — E66.01 MORBID OBESITY (H): Primary | ICD-10-CM

## 2025-06-05 NOTE — PROGRESS NOTES
Pam is a 54 year old who is being evaluated via a billable video visit.    How would you like to obtain your AVS? MyChart  If the video visit is dropped, the invitation should be resent by: Text to cell phone: [phone]  Will anyone else be joining your video visit? No      Assessment & Plan     Morbid obesity (H)  Has neither gained nor lost weight since switching to the sublingual. Doesn't feel the fullness or satiety that she felt with the injection.   -Will ask RN to call the pharmacy to clarify what does she is currently on, then we will increase dose now as opposed to waiting another few weeks.   -Start calorie tracking asap  -In the future, could add/switch to  phentermine and topiramate          Subjective   Sublingual dose. 0.75mg. Has not lost or gained. No side effects.    228#  Pam is a 54 year old, presenting for the following health issues:  Weight Management      6/5/2025    11:49 AM   Additional Questions   Roomed by Michelle Fitzgerald CMA     HPI    Weight management                Objective    Vitals - Patient Reported  Weight (Patient Reported): 103.4 kg (228 lb)        Physical Exam   GENERAL: alert and no distress  EYES: Eyes grossly normal to inspection.  No discharge or erythema, or obvious scleral/conjunctival abnormalities.  RESP: No audible wheeze, cough, or visible cyanosis.    SKIN: Visible skin clear. No significant rash, abnormal pigmentation or lesions.  NEURO: Cranial nerves grossly intact.  Mentation and speech appropriate for age.  PSYCH: Appropriate affect, tone, and pace of words          Video-Visit Details    Type of service:  Video Visit   Originating Location (pt. Location): Home    Distant Location (provider location):  Off-site  Platform used for Video Visit: Lien  Signed Electronically by: JOSEFINA FLORES CNP

## 2025-06-05 NOTE — PROGRESS NOTES
Called  [facility] at [phone] and spoke with staff Khai to inquire about last dose and date patient filled of sublingual semaglutide.    States the last fill date was 5/13/25 by mail and received around 5/17/25 and dose was 0.75 ml 1.5 mg. States is she is to go up in dose, has new concentration that is 5mg/1ml which is less medication to ingest. States is in 10 ml increments.    Jackelin Cintron please review and advise.     Mendy López, RN

## 2025-06-08 SDOH — HEALTH STABILITY: PHYSICAL HEALTH: ON AVERAGE, HOW MANY MINUTES DO YOU ENGAGE IN EXERCISE AT THIS LEVEL?: 0 MIN

## 2025-06-08 SDOH — HEALTH STABILITY: PHYSICAL HEALTH: ON AVERAGE, HOW MANY DAYS PER WEEK DO YOU ENGAGE IN MODERATE TO STRENUOUS EXERCISE (LIKE A BRISK WALK)?: 0 DAYS

## 2025-06-09 ENCOUNTER — OFFICE VISIT (OUTPATIENT)
Dept: ORTHOPEDICS | Facility: CLINIC | Age: 55
End: 2025-06-09
Payer: COMMERCIAL

## 2025-06-09 ENCOUNTER — ANCILLARY PROCEDURE (OUTPATIENT)
Dept: GENERAL RADIOLOGY | Facility: CLINIC | Age: 55
End: 2025-06-09
Attending: STUDENT IN AN ORGANIZED HEALTH CARE EDUCATION/TRAINING PROGRAM
Payer: COMMERCIAL

## 2025-06-09 DIAGNOSIS — G89.29 CHRONIC RIGHT SHOULDER PAIN: ICD-10-CM

## 2025-06-09 DIAGNOSIS — M75.81 ROTATOR CUFF TENDINITIS, RIGHT: Primary | ICD-10-CM

## 2025-06-09 DIAGNOSIS — M25.511 CHRONIC RIGHT SHOULDER PAIN: ICD-10-CM

## 2025-06-09 DIAGNOSIS — M75.41 SUBACROMIAL IMPINGEMENT, RIGHT: ICD-10-CM

## 2025-06-09 PROCEDURE — 73030 X-RAY EXAM OF SHOULDER: CPT | Mod: TC | Performed by: RADIOLOGY

## 2025-06-09 PROCEDURE — 20611 DRAIN/INJ JOINT/BURSA W/US: CPT | Mod: RT | Performed by: STUDENT IN AN ORGANIZED HEALTH CARE EDUCATION/TRAINING PROGRAM

## 2025-06-09 PROCEDURE — 99214 OFFICE O/P EST MOD 30 MIN: CPT | Mod: 25 | Performed by: STUDENT IN AN ORGANIZED HEALTH CARE EDUCATION/TRAINING PROGRAM

## 2025-06-09 RX ORDER — BETAMETHASONE SODIUM PHOSPHATE AND BETAMETHASONE ACETATE 3; 3 MG/ML; MG/ML
6 INJECTION, SUSPENSION INTRA-ARTICULAR; INTRALESIONAL; INTRAMUSCULAR; SOFT TISSUE
Status: COMPLETED | OUTPATIENT
Start: 2025-06-09 | End: 2025-06-09

## 2025-06-09 RX ORDER — ROPIVACAINE HYDROCHLORIDE 5 MG/ML
2 INJECTION, SOLUTION EPIDURAL; INFILTRATION; PERINEURAL
Status: COMPLETED | OUTPATIENT
Start: 2025-06-09 | End: 2025-06-09

## 2025-06-09 RX ORDER — LIDOCAINE HYDROCHLORIDE 10 MG/ML
5 INJECTION, SOLUTION INFILTRATION; PERINEURAL
Status: COMPLETED | OUTPATIENT
Start: 2025-06-09 | End: 2025-06-09

## 2025-06-09 RX ADMIN — ROPIVACAINE HYDROCHLORIDE 2 ML: 5 INJECTION, SOLUTION EPIDURAL; INFILTRATION; PERINEURAL at 17:01

## 2025-06-09 RX ADMIN — LIDOCAINE HYDROCHLORIDE 5 ML: 10 INJECTION, SOLUTION INFILTRATION; PERINEURAL at 17:01

## 2025-06-09 RX ADMIN — BETAMETHASONE SODIUM PHOSPHATE AND BETAMETHASONE ACETATE 6 MG: 3; 3 INJECTION, SUSPENSION INTRA-ARTICULAR; INTRALESIONAL; INTRAMUSCULAR; SOFT TISSUE at 17:01

## 2025-06-09 NOTE — PROGRESS NOTES
ASSESSMENT & PLAN    Pam was seen today for pain.    Diagnoses and all orders for this visit:    Rotator cuff tendinitis, right  -     XR Shoulder Right G/E 3 Views; Future  -     Large Joint Injection/Arthocentesis: R subacromial bursa  -     Physical Therapy  Referral; Future    Subacromial impingement, right  -     Large Joint Injection/Arthocentesis: R subacromial bursa  -     Physical Therapy  Referral; Future      This issue is chronic and Unchanged.Pma presents our clinic to discuss her chronic right shoulder pain.  She reports pain in the posterior lateral aspect of the shoulder for many years without injury.  Radiographs taken in clinic today reviewed with the patient show mild degenerative changes at the glenohumeral and acromioclavicular joint but no acute bony abnormalities.  On examination, she has a positive painful arc, as well as pain with impingement and rotator cuff testing, consistent with rotator cuff tendinitis and subacromial impingement is likely main drivers of her symptoms.  We discussed these findings and treatment options including anti-inflammatory medicines, physical therapy, corticosteroid injections, and surgical intervention.  We determined the following plan:  - CSI to the right subacromial bursa performed today under ultrasound guidance, see procedure note below for details  - Physical therapy referral placed  - She can follow-up with me in 6 to 8 weeks if not improving for further evaluation and treatment    Brian Bravo [facility]    -----  Chief Complaint   Patient presents with    Right Shoulder - Pain       SUBJECTIVE  Pam Gaviria is a/an 54 year old female who is seen as a self referral for evaluation of right shoulder pain.     The patient is seen with their mother.  The patient is Right handed    Onset: 5-6 years(s) ago with pain worsening more recently. Reports insidious onset without acute  precipitating event.  Location of Pain: right posterior and lateral shoulder  Worsened by: reaching overhead, lifting, reaching behind back  Better with: cortisone injection  Treatments tried: rest/activity avoidance, home exercises, physical therapy (2 visits in 2015), and corticosteroid injection (most recent date: 5-6 years ago at [facility]) that provided  1.5-2 years(s) of relief  Associated symptoms: intermittent numbness / tingling radiating down to bilateral hands    Orthopedic/Surgical history: NO  Social History/Occupation: computer work      REVIEW OF SYSTEMS:  Review of systems negative unless mentioned in HPI     OBJECTIVE:  LMP  (LMP Unknown)    General: healthy, alert and in no distress  Skin: no suspicious lesions or rash.  CV: distal perfusion intact   Resp: normal respiratory effort without conversational dyspnea   Psych: normal mood and affect  Gait: NORMAL  Neuro: Normal light sensory exam of RL extremity     Shoulder Examination (focused):   Left  Right     Skin intact intact   Inspection No erythema, ecchymosis  No erythema, ecchymosis    Palpation Tenderness: None Tenderness: None   Range of Motion (active) Forward flexion:175   GH Abduction: 90  Reach behind back: T10 Forward flexion:175 painful  GH Abduction: 90 painful   Reach behind back: T10   Range of Motion (passive) Forward flexion:175   GH Abduction: 90  ER at side: 70  ER at 90 deg abduction: 90  IR at 90 deg abduction: 60 Forward flexion:175   GH Abduction: 90  ER at side: 70  ER at 90 deg abduction: 90  IR at 90 deg abduction: 60   Crepitus No No   Strength Internal Rotation at side: 5+  External Rotation at side: 5+  Belly Press: 5+ Internal Rotation at side: 5+  External Rotation at side: 5+  Belly Press: 5+    Special Tests Kulkarni: NP  Neer: NP  Barbara: 5+  Speed's: 5+  Cross arm: Negative  Kulkarni: POS  Neer: NP  Barbara: 5+ painful   Speed's: 5+  Cross arm: Negative      Other Positive Tests/ Notable Findings O'jennifer's: NP  Bicep Load  I/II: NP  Internal Impingement: NP  Yergason: NP   ERLS: NP  Hornblower: NP  Bear Hug: NP O'jennifer's: NP  Bicep Load I/II: NP  Internal Impingement: NP  Yergason: NP   ERLS: NP  Hornblower: NP  Bear Hug: NP   Instability Generalized laxity: no  Anterior apprehension: Negative  Load and shift (anterior): NP  Load and shift (posterior): NP Generalized laxity: no  Anterior apprehension: Negative  Load and shift (anterior): NP  Load and shift (posterior): NP   Neurologic No abnormal sensation.   Scapular Motion Normal scapular alignment bilaterally without notable protraction/retraction, elevation/depression  No dynamic evidence of scapular dyskinesia           RADIOLOGY:  Final results and radiologist's interpretation, available in the [facility] health record.  Images were reviewed with the patient in the office today.  My personal interpretation of the performed imaging: Mild degenerative changes of the acromioclavicular and glenohumeral joint no acute bony abnormalities.    Large Joint Injection/Arthocentesis: R subacromial bursa    Date/Time: 6/9/2025 5:01 PM    Performed by: Brian Bravo DO  Authorized by: Brian Bravo DO    Indications:  Pain  Needle Size:  25 G  Guidance: ultrasound    Approach:  Anterolateral  Location:  Shoulder      Site:  R subacromial bursa  Medications:  6 mg betamethasone acet & sod phos 6 (3-3) MG/ML; 5 mL lidocaine 1 %; 2 mL ROPivacaine 5 MG/ML  Medications comment:  1ml of 8.4% Sodium Bicarbonate solution was used to buffer the local numbing agent for today's injection    Outcome:  Tolerated well, no immediate complications  Procedure discussed: discussed risks, benefits, and alternatives    Consent Given by:  Patient  Timeout: timeout called immediately prior to procedure    Prep: patient was prepped and draped in usual sterile fashion     Ultrasound was used to ensure safe and accurate needle placement and injection. Ultrasound images of the procedure were permanently  stored.

## 2025-06-09 NOTE — LETTER
6/9/2025      Pam Gaviria  [address]  [place] [address]      Dear Colleague,    Thank you for referring your patient, Pam Gaviria, to the [facility]. Please see a copy of my visit note below.    ASSESSMENT & PLAN    Pam was seen today for pain.    Diagnoses and all orders for this visit:    Rotator cuff tendinitis, right  -     XR Shoulder Right G/E 3 Views; Future  -     Large Joint Injection/Arthocentesis: R subacromial bursa  -     Physical Therapy  Referral; Future    Subacromial impingement, right  -     Large Joint Injection/Arthocentesis: R subacromial bursa  -     Physical Therapy  Referral; Future      This issue is chronic and Unchanged.Pam presents our clinic to discuss her chronic right shoulder pain.  She reports pain in the posterior lateral aspect of the shoulder for many years without injury.  Radiographs taken in clinic today reviewed with the patient show mild degenerative changes at the glenohumeral and acromioclavicular joint but no acute bony abnormalities.  On examination, she has a positive painful arc, as well as pain with impingement and rotator cuff testing, consistent with rotator cuff tendinitis and subacromial impingement is likely main drivers of her symptoms.  We discussed these findings and treatment options including anti-inflammatory medicines, physical therapy, corticosteroid injections, and surgical intervention.  We determined the following plan:  - CSI to the right subacromial bursa performed today under ultrasound guidance, see procedure note below for details  - Physical therapy referral placed  - She can follow-up with me in 6 to 8 weeks if not improving for further evaluation and treatment    Brian Bravo,   [facility]    -----  Chief Complaint   Patient presents with     Right Shoulder - Pain       SUBJECTIVE  Pam Gaviria is a/an 54  year old female who is seen as a self referral for evaluation of right shoulder pain.     The patient is seen with their mother.  The patient is Right handed    Onset: 5-6 years(s) ago with pain worsening more recently. Reports insidious onset without acute precipitating event.  Location of Pain: right posterior and lateral shoulder  Worsened by: reaching overhead, lifting, reaching behind back  Better with: cortisone injection  Treatments tried: rest/activity avoidance, home exercises, physical therapy (2 visits in 2015), and corticosteroid injection (most recent date: 5-6 years ago at [facility]) that provided  1.5-2 years(s) of relief  Associated symptoms: intermittent numbness / tingling radiating down to bilateral hands    Orthopedic/Surgical history: NO  Social History/Occupation: computer work      REVIEW OF SYSTEMS:  Review of systems negative unless mentioned in HPI     OBJECTIVE:  LMP  (LMP Unknown)    General: healthy, alert and in no distress  Skin: no suspicious lesions or rash.  CV: distal perfusion intact   Resp: normal respiratory effort without conversational dyspnea   Psych: normal mood and affect  Gait: NORMAL  Neuro: Normal light sensory exam of RL extremity     Shoulder Examination (focused):   Left  Right     Skin intact intact   Inspection No erythema, ecchymosis  No erythema, ecchymosis    Palpation Tenderness: None Tenderness: None   Range of Motion (active) Forward flexion:175   GH Abduction: 90  Reach behind back: T10 Forward flexion:175 painful  GH Abduction: 90 painful   Reach behind back: T10   Range of Motion (passive) Forward flexion:175   GH Abduction: 90  ER at side: 70  ER at 90 deg abduction: 90  IR at 90 deg abduction: 60 Forward flexion:175   GH Abduction: 90  ER at side: 70  ER at 90 deg abduction: 90  IR at 90 deg abduction: 60   Crepitus No No   Strength Internal Rotation at side: 5+  External Rotation at side: 5+  Belly Press: 5+ Internal Rotation at side: 5+  External Rotation at  side: 5+  Belly Press: 5+    Special Tests Kulkarni: NP  Neer: NP  Barbara: 5+  Speed's: 5+  Cross arm: Negative  Kulkarni: POS  Neer: NP  Barbara: 5+ painful   Speed's: 5+  Cross arm: Negative      Other Positive Tests/ Notable Findings O'jennifer's: NP  Bicep Load I/II: NP  Internal Impingement: NP  Yergason: NP   ERLS: NP  Hornblower: NP  Bear Hug: NP O'jennifer's: NP  Bicep Load I/II: NP  Internal Impingement: NP  Yergason: NP   ERLS: NP  Hornblower: NP  Bear Hug: NP   Instability Generalized laxity: no  Anterior apprehension: Negative  Load and shift (anterior): NP  Load and shift (posterior): NP Generalized laxity: no  Anterior apprehension: Negative  Load and shift (anterior): NP  Load and shift (posterior): NP   Neurologic No abnormal sensation.   Scapular Motion Normal scapular alignment bilaterally without notable protraction/retraction, elevation/depression  No dynamic evidence of scapular dyskinesia           RADIOLOGY:  Final results and radiologist's interpretation, available in the [facility] health record.  Images were reviewed with the patient in the office today.  My personal interpretation of the performed imaging: Mild degenerative changes of the acromioclavicular and glenohumeral joint no acute bony abnormalities.    Large Joint Injection/Arthocentesis: R subacromial bursa    Date/Time: 6/9/2025 5:01 PM    Performed by: Brian Bravo DO  Authorized by: Brian Bravo DO    Indications:  Pain  Needle Size:  25 G  Guidance: ultrasound    Approach:  Anterolateral  Location:  Shoulder      Site:  R subacromial bursa  Medications:  6 mg betamethasone acet & sod phos 6 (3-3) MG/ML; 5 mL lidocaine 1 %; 2 mL ROPivacaine 5 MG/ML  Medications comment:  1ml of 8.4% Sodium Bicarbonate solution was used to buffer the local numbing agent for today's injection    Outcome:  Tolerated well, no immediate complications  Procedure discussed: discussed risks, benefits, and alternatives    Consent Given by:  Patient  Timeout:  timeout called immediately prior to procedure    Prep: patient was prepped and draped in usual sterile fashion     Ultrasound was used to ensure safe and accurate needle placement and injection. Ultrasound images of the procedure were permanently stored.            Again, thank you for allowing me to participate in the care of your patient.        Sincerely,        Brian Bravo, DO    Electronically signed

## 2025-06-10 ENCOUNTER — TELEPHONE (OUTPATIENT)
Dept: ORTHOPEDICS | Facility: CLINIC | Age: 55
End: 2025-06-10
Payer: COMMERCIAL

## 2025-06-10 NOTE — TELEPHONE ENCOUNTER
Reason for Call:  Form, our goal is to have forms completed with 7 days, however, some forms may require a visit or additional information.    Type of letter, form or note:  requesting medical records and name of company/ rep: [employer]    How was form received: form was mailed in    Desired completion date of form: ASAP      How will form be returned?:  mailed to [employer] [address] [place] [address]    Has the patient signed a consent form for release of information (may be included with form)? NO    Additional comments: Requesting medical records    Forms were faxed to medical records completion at [facility].

## 2025-06-11 ENCOUNTER — THERAPY VISIT (OUTPATIENT)
Dept: PHYSICAL THERAPY | Facility: CLINIC | Age: 55
End: 2025-06-11
Attending: STUDENT IN AN ORGANIZED HEALTH CARE EDUCATION/TRAINING PROGRAM
Payer: COMMERCIAL

## 2025-06-11 DIAGNOSIS — M25.562 ACUTE PAIN OF BOTH KNEES: Primary | ICD-10-CM

## 2025-06-11 DIAGNOSIS — M17.12 PRIMARY OSTEOARTHRITIS OF LEFT KNEE: ICD-10-CM

## 2025-06-11 DIAGNOSIS — M25.561 ACUTE PAIN OF BOTH KNEES: Primary | ICD-10-CM

## 2025-06-11 DIAGNOSIS — M23.52 OLD COMPLETE ACL TEAR, LEFT: ICD-10-CM

## 2025-06-11 DIAGNOSIS — S83.272A COMPLEX TEAR OF LATERAL MENISCUS OF LEFT KNEE AS CURRENT INJURY, INITIAL ENCOUNTER: ICD-10-CM

## 2025-06-11 DIAGNOSIS — M17.11 PRIMARY OSTEOARTHRITIS OF RIGHT KNEE: ICD-10-CM

## 2025-06-11 DIAGNOSIS — S83.232A COMPLEX TEAR OF MEDIAL MENISCUS OF LEFT KNEE AS CURRENT INJURY, INITIAL ENCOUNTER: ICD-10-CM

## 2025-06-11 PROCEDURE — 97110 THERAPEUTIC EXERCISES: CPT | Mod: GP | Performed by: PHYSICAL THERAPIST

## 2025-06-11 PROCEDURE — 97161 PT EVAL LOW COMPLEX 20 MIN: CPT | Mod: GP | Performed by: PHYSICAL THERAPIST

## 2025-06-11 ASSESSMENT — ACTIVITIES OF DAILY LIVING (ADL)
LIMPING: THE SYMPTOM AFFECTS MY ACTIVITY SLIGHTLY
PAIN: THE SYMPTOM AFFECTS MY ACTIVITY SEVERELY
KNEE_ACTIVITY_OF_DAILY_LIVING_SUM: 31
AS_A_RESULT_OF_YOUR_KNEE_INJURY,_HOW_WOULD_YOU_RATE_YOUR_CURRENT_LEVEL_OF_DAILY_ACTIVITY?: SEVERELY ABNORMAL
GO UP STAIRS: ACTIVITY IS FAIRLY DIFFICULT
PAIN: THE SYMPTOM AFFECTS MY ACTIVITY SEVERELY
HOW_WOULD_YOU_RATE_THE_OVERALL_FUNCTION_OF_YOUR_KNEE_DURING_YOUR_USUAL_DAILY_ACTIVITIES?: ABNORMAL
KNEEL ON THE FRONT OF YOUR KNEE: ACTIVITY IS VERY DIFFICULT
GIVING WAY, BUCKLING OR SHIFTING OF KNEE: THE SYMPTOM AFFECTS MY ACTIVITY MODERATELY
RAW_SCORE: 31
SWELLING: THE SYMPTOM AFFECTS MY ACTIVITY SLIGHTLY
SWELLING: THE SYMPTOM AFFECTS MY ACTIVITY SLIGHTLY
GIVING WAY, BUCKLING OR SHIFTING OF KNEE: THE SYMPTOM AFFECTS MY ACTIVITY MODERATELY
STAND: ACTIVITY IS SOMEWHAT DIFFICULT
WALK: ACTIVITY IS FAIRLY DIFFICULT
SIT WITH YOUR KNEE BENT: ACTIVITY IS MINIMALLY DIFFICULT
STIFFNESS: THE SYMPTOM AFFECTS MY ACTIVITY MODERATELY
SIT WITH YOUR KNEE BENT: ACTIVITY IS MINIMALLY DIFFICULT
KNEEL ON THE FRONT OF YOUR KNEE: ACTIVITY IS VERY DIFFICULT
STAND: ACTIVITY IS SOMEWHAT DIFFICULT
STIFFNESS: THE SYMPTOM AFFECTS MY ACTIVITY MODERATELY
SQUAT: ACTIVITY IS VERY DIFFICULT
RISE FROM A CHAIR: ACTIVITY IS MINIMALLY DIFFICULT
GO DOWN STAIRS: ACTIVITY IS FAIRLY DIFFICULT
WEAKNESS: THE SYMPTOM AFFECTS MY ACTIVITY SEVERELY
HOW_WOULD_YOU_RATE_THE_CURRENT_FUNCTION_OF_YOUR_KNEE_DURING_YOUR_USUAL_DAILY_ACTIVITIES_ON_A_SCALE_FROM_0_TO_100_WITH_100_BEING_YOUR_LEVEL_OF_KNEE_FUNCTION_PRIOR_TO_YOUR_INJURY_AND_0_BEING_THE_INABILITY_TO_PERFORM_ANY_OF_YOUR_USUAL_DAILY_ACTIVITIES?: 25
AS_A_RESULT_OF_YOUR_KNEE_INJURY,_HOW_WOULD_YOU_RATE_YOUR_CURRENT_LEVEL_OF_DAILY_ACTIVITY?: SEVERELY ABNORMAL
LIMPING: THE SYMPTOM AFFECTS MY ACTIVITY SLIGHTLY
RISE FROM A CHAIR: ACTIVITY IS MINIMALLY DIFFICULT
GO DOWN STAIRS: ACTIVITY IS FAIRLY DIFFICULT
WEAKNESS: THE SYMPTOM AFFECTS MY ACTIVITY SEVERELY
SQUAT: ACTIVITY IS VERY DIFFICULT
PLEASE_INDICATE_YOR_PRIMARY_REASON_FOR_REFERRAL_TO_THERAPY:: KNEE
WALK: ACTIVITY IS FAIRLY DIFFICULT
HOW_WOULD_YOU_RATE_THE_CURRENT_FUNCTION_OF_YOUR_KNEE_DURING_YOUR_USUAL_DAILY_ACTIVITIES_ON_A_SCALE_FROM_0_TO_100_WITH_100_BEING_YOUR_LEVEL_OF_KNEE_FUNCTION_PRIOR_TO_YOUR_INJURY_AND_0_BEING_THE_INABILITY_TO_PERFORM_ANY_OF_YOUR_USUAL_DAILY_ACTIVITIES?: 25
KNEE_ACTIVITY_OF_DAILY_LIVING_SCORE: 44.29
HOW_WOULD_YOU_RATE_THE_OVERALL_FUNCTION_OF_YOUR_KNEE_DURING_YOUR_USUAL_DAILY_ACTIVITIES?: ABNORMAL
GO UP STAIRS: ACTIVITY IS FAIRLY DIFFICULT

## 2025-06-11 NOTE — PROGRESS NOTES
"PHYSICAL THERAPY EVALUATION  Type of Visit: Evaluation       Fall Risk Screen:  Have you fallen 2 or more times in the past year?: No  Have you fallen and had an injury in the past year?: No  Is patient receiving Physical Therapy Services?: No    Subjective         Presenting condition or subjective complaint: torn meniscus    Patient with chronic R knee pain d/t meniscal tear 2 years ago, was walking in her kitchen 1.5 months ago, turned (possibly pivoted) and heard a crack in her L knee with immediate onset of pain. Pain is located posterior and lateral States the pain in her L knee wasn't as bad as when she initially injured her R knee. L knee MRI results below, acute meniscal tears, an old ACL tear (15 years ago w/o surgery) and significant chondromalacia. Currently reports the R knee pain is worse than the L. Reports a sharp R medial knee pain that is constant, with pressure below the medial knee (points to pes anserine). The L knee tesha and crackles more than her R but pain is less. Reports being very careful walking d/t the buckling. Steroid injections 5/20/25 L knee, and 6/3/2025 R knee, reports relief with injections, is able to walk and navigate the stairs, though does modify and use the rails. Feels she gets \"stuck\" sitting down if she has been sitting too long, has trouble transferring to standing. As a result, takes more breaks while working. Hasn't tried anything for the pain, doesn't like to take pills and doesn't like ice. Has tried Voltaren gel but didn't notice a difference.    Date of onset: 05/02/25    Relevant medical history:     Dates & types of surgery: gallbladder 2020 [id number]  plantar fasia 8/2024    Prior diagnostic imaging/testing results: MRI; X-ray       L knee MRI 5/8/2025  Impression:  1. Predominantly horizontal tear of the medial meniscus body and  posterior horn with 5 mm meniscal flap in the medial femoral gutter  and peripheral extrusion of the meniscal body.  2. " Predominantly horizontal tear of the lateral meniscus body and  anterior horn with marked truncation of the anterior horn with  abnormal signal extension into the anterior root ligament, and 12 mm  meniscal flap in the lateral femoral gutter.  3. Essentially complete/near-complete tear of the anterior cruciate  ligament with tiny strand of residual fiber, possibly chronic.  *  No associated bone contusion pattern.   *  No anterior translation.   4. Ossified hypointense joint bodies posteromedially, likely embedded  within synovium/posterior parameniscal fat.  5. Tricompartmental chondromalacia with grade 4 change in  patellofemoral and grade 2 in medial and lateral compartments.  6. Ganglion/synovial cysts arising at the lateral head of  gastrocnemius attachment and deep infrapatellar bursa.  7. Anatomic variation with high origin of the anterior tibial artery,  which courses deep to popliteus muscle belly.     R Knee MRI 1/8/2022  Impression:  1. Predominantly horizontal tear of the medial meniscal body and  posterior horn with approximately 5 mm displaced meniscal flap in the  medial tibial gutter.  2. Signal attenuation anterior root ligament of lateral meniscus,  possibly related to adjacent ganglion/synovial cysts with degeneration  of the root ligament. Otherwise, no lateral meniscal tear.   3. Low grade intrasubstance tear conjoined tendon distally.   4. Tricompartmental chondromalacia, greatest in the medial compartment  with grade 4 change. Focally grade III chondromalacia in the  patellofemoral and lateral compartments.  5. Moderate sized popliteal cyst.    Prior therapy history for the same diagnosis, illness or injury: No      Prior Level of Function  Transfers: Independent  Ambulation: Independent  ADL: Independent  IADL:     Living Environment  Social support: With family members   Type of home: House; Multi-level   Stairs to enter the home: Yes 3 Is there a railing: No     Ramp: No   Stairs inside the  home: Yes 16 Is there a railing: Yes     Help at home: None  Equipment owned:       Employment: Yes adminstrative manager  Hobbies/Interests: reading  tv  cooking    Patient goals for therapy: walk excersise    Pain assessment: Location: B knee/Rating: currently R: 3/10, L: 2/10; at worst R: 9/10, L: 6/10     Objective       Gait:    Gait Type:  Antalgic      Assistive Devices:  None  Deviations:  Lumbar:  Trunk lean L and trunk lean RHip:  Trendelenberg L and Trendelenberg R    Flexibility/Screens:       Lower Extremity:  Decreased left lower extremity flexibility:Hip Flexors; IT Band; Quadriceps and Hamstrings    Decreased right lower extremity flexibility:  Hip Flexors; IT Band; Quadriceps and Hamstrings                                               Hip Evaluation        Hip Strength:    Flexion:   Left:  5-/5    Pain:  Right:  4/5    Pain:                    Extension:  Left:  4+/5   Pain:Right:  4+/5     Pain:    Abduction:  Left:  4-/5      Pain:Right:  3+/5     Pain:                             Knee Evaluation:  ROM:    AROM      Extension:  Left: 0    Right:  0  Flexion: Left: 122 pain and cracking    Right: 118 pain  PROM    Hyperextension: Left: 3    Right:  5          Strength:     Extension:  Left: 5/5     Pain:      Right: 5-/5     Pain:  Flexion:  Left: 5-/5     Pain:+      Right: 5-/5     Pain:   Quad Set Left:  Fair    Pain: -   Quad Set Right:  Fair    Pain: -        Edema:  Edema of the knee: generalized R knee swelling.    Mobility Testing:      Patellofemoral Medial:  Left: hypomobile    Right: hypomobile  Patellofemoral Lateral:  Left: hypomobile    Right: hypomobile  Patellofemoral Superior:  Left: hypomobile    Right: hypomobile  Patellofemoral Inferior:  Left: hypomobile    Right: hypomobile  Functional Testing:            Proprioception:    Stork Balance Test:  Left:  Unable  Right:  Unable  % of Uninvolved:         General       Assessment & Plan   CLINICAL IMPRESSIONS  Medical Diagnosis:  Complex tear of medial meniscus of left knee as current injury, initial encounter  Complex tear of lateral meniscus of left knee as current injury, initial encounter  Primary osteoarthritis of left knee  Old complete ACL tear, left    Treatment Diagnosis: B knee pain   Impression/Assessment: Patient is a 54 year old female with B knee complaints.  The following significant findings have been identified: Pain, Decreased ROM/flexibility, Decreased joint mobility, Decreased strength, Impaired balance, Decreased proprioception, Inflammation, Edema, Impaired gait, Impaired muscle performance, and Decreased activity tolerance. These impairments interfere with their ability to perform self care tasks, work tasks, recreational activities, household chores, driving , household mobility, and community mobility as compared to previous level of function.     Clinical Decision Making (Complexity):  Clinical Presentation: Stable/Uncomplicated  Clinical Presentation Rationale: based on medical and personal factors listed in PT evaluation  Clinical Decision Making (Complexity): Low complexity    PLAN OF CARE  Treatment Interventions:  Interventions: Gait Training, Manual Therapy, Neuromuscular Re-education, Therapeutic Activity, Therapeutic Exercise, Self-Care/Home Management    Long Term Goals     PT Goal 1  Goal Identifier: transfers  Goal Description: Patient will be able to transfer from sit to stand with < 2/10 B knee pain in order for household and community mobiltiy.  Target Date: 09/03/25  PT Goal 2  Goal Identifier: stairs  Goal Description: Patient will be able to navigate 1 flight of stairs reciprocally w/ rails and < 2/10 knee pain in order for household and community mobility.  Target Date: 09/03/25      Frequency of Treatment: 1x/wk  Duration of Treatment: 12 weeks    Recommended Referrals to Other Professionals:   Education Assessment:   Learner/Method: Patient;Listening;Pictures/Video;Demonstration  Education  Comments: Educated on findings of exam, anatomy, pathophysiology, importance of HEP, likely frequency/duration of PT.    Risks and benefits of evaluation/treatment have been explained.   Patient/Family/caregiver agrees with Plan of Care.     Evaluation Time:     PT Tahmina, Low Complexity Minutes (80078): 25       Signing Clinician: Ruth Shepard PT

## 2025-06-25 ENCOUNTER — TELEPHONE (OUTPATIENT)
Dept: PEDIATRICS | Facility: CLINIC | Age: 55
End: 2025-06-25

## 2025-06-25 ENCOUNTER — THERAPY VISIT (OUTPATIENT)
Dept: PHYSICAL THERAPY | Facility: CLINIC | Age: 55
End: 2025-06-25
Attending: STUDENT IN AN ORGANIZED HEALTH CARE EDUCATION/TRAINING PROGRAM
Payer: COMMERCIAL

## 2025-06-25 DIAGNOSIS — M25.561 ACUTE PAIN OF BOTH KNEES: ICD-10-CM

## 2025-06-25 DIAGNOSIS — M17.11 PRIMARY OSTEOARTHRITIS OF RIGHT KNEE: Primary | ICD-10-CM

## 2025-06-25 DIAGNOSIS — S83.232A COMPLEX TEAR OF MEDIAL MENISCUS OF LEFT KNEE AS CURRENT INJURY, INITIAL ENCOUNTER: ICD-10-CM

## 2025-06-25 DIAGNOSIS — M25.562 ACUTE PAIN OF BOTH KNEES: ICD-10-CM

## 2025-06-25 DIAGNOSIS — S83.272A COMPLEX TEAR OF LATERAL MENISCUS OF LEFT KNEE AS CURRENT INJURY, INITIAL ENCOUNTER: ICD-10-CM

## 2025-06-25 DIAGNOSIS — M17.12 PRIMARY OSTEOARTHRITIS OF LEFT KNEE: ICD-10-CM

## 2025-06-25 DIAGNOSIS — M23.52 OLD COMPLETE ACL TEAR, LEFT: ICD-10-CM

## 2025-06-25 PROCEDURE — 97140 MANUAL THERAPY 1/> REGIONS: CPT | Mod: GP | Performed by: PHYSICAL THERAPIST

## 2025-06-25 PROCEDURE — 97110 THERAPEUTIC EXERCISES: CPT | Mod: GP | Performed by: PHYSICAL THERAPIST

## 2025-06-25 NOTE — TELEPHONE ENCOUNTER
Disp Refills Start End TYLOR    semaglutide-weight management (WEGOVY) 0.25 MG/0.5ML pen (Discontinued) 2 mL 0 5/29/2025 6/5/2025 --   Sig - Route: Inject 0.5 mLs (0.25 mg) subcutaneously once a week. - Subcutaneous

## 2025-06-26 NOTE — TELEPHONE ENCOUNTER
Called and spoke to patient. She did not request a refill. She states insurance does not cover.  Kajal ALANIS RN, BSN  Clinic RN  [facility]

## 2025-06-26 NOTE — TELEPHONE ENCOUNTER
Please clarify request, is patient requesting refill? What dose?     Eligio ERWIN RN 6/26/2025 at 9:15 AM

## 2025-07-02 ENCOUNTER — THERAPY VISIT (OUTPATIENT)
Dept: PHYSICAL THERAPY | Facility: CLINIC | Age: 55
End: 2025-07-02
Payer: COMMERCIAL

## 2025-07-02 DIAGNOSIS — M75.41 SUBACROMIAL IMPINGEMENT, RIGHT: ICD-10-CM

## 2025-07-02 DIAGNOSIS — M75.81 ROTATOR CUFF TENDINITIS, RIGHT: Primary | ICD-10-CM

## 2025-07-02 PROCEDURE — 97110 THERAPEUTIC EXERCISES: CPT | Mod: GP | Performed by: PHYSICAL THERAPIST

## 2025-07-02 PROCEDURE — 97161 PT EVAL LOW COMPLEX 20 MIN: CPT | Mod: GP | Performed by: PHYSICAL THERAPIST

## 2025-07-02 ASSESSMENT — ACTIVITIES OF DAILY LIVING (ADL)
CARRYING_A_HEAVY_OBJECT_OF_10_POUNDS: 4
PUTTING_ON_AN_UNDERSHIRT_OR_A_PULLOVER_SWEATER: 3
WHEN_LYING_ON_THE_INVOLVED_SIDE: 8
AT_ITS_WORST?: 6
REMOVING_SOMETHING_FROM_YOUR_BACK_POCKET: 0
WASHING_YOUR_BACK: 3
PLACING_AN_OBJECT_ON_A_HIGH_SHELF: 3
PUTTING_ON_A_SHIRT_THAT_BUTTONS_DOWN_THE_FRONT: 0
WASHING_YOUR_HAIR?: 3
PUSHING_WITH_THE_INVOLVED_ARM: 6
PLEASE_INDICATE_YOR_PRIMARY_REASON_FOR_REFERRAL_TO_THERAPY:: SHOULDER
PUTTING_ON_YOUR_PANTS: 0
TOUCHING_THE_BACK_OF_YOUR_NECK: 5
REACHING_FOR_SOMETHING_ON_A_HIGH_SHELF: 8

## 2025-07-07 ENCOUNTER — THERAPY VISIT (OUTPATIENT)
Dept: PHYSICAL THERAPY | Facility: CLINIC | Age: 55
End: 2025-07-07
Payer: COMMERCIAL

## 2025-07-07 DIAGNOSIS — M25.562 ACUTE PAIN OF BOTH KNEES: ICD-10-CM

## 2025-07-07 DIAGNOSIS — M17.11 PRIMARY OSTEOARTHRITIS OF RIGHT KNEE: ICD-10-CM

## 2025-07-07 DIAGNOSIS — M75.41 SUBACROMIAL IMPINGEMENT, RIGHT: ICD-10-CM

## 2025-07-07 DIAGNOSIS — M17.12 PRIMARY OSTEOARTHRITIS OF LEFT KNEE: ICD-10-CM

## 2025-07-07 DIAGNOSIS — M23.52 OLD COMPLETE ACL TEAR, LEFT: ICD-10-CM

## 2025-07-07 DIAGNOSIS — M75.81 ROTATOR CUFF TENDINITIS, RIGHT: Primary | ICD-10-CM

## 2025-07-07 DIAGNOSIS — M25.561 ACUTE PAIN OF BOTH KNEES: ICD-10-CM

## 2025-07-07 DIAGNOSIS — S83.272A COMPLEX TEAR OF LATERAL MENISCUS OF LEFT KNEE AS CURRENT INJURY, INITIAL ENCOUNTER: ICD-10-CM

## 2025-07-07 DIAGNOSIS — S83.232A COMPLEX TEAR OF MEDIAL MENISCUS OF LEFT KNEE AS CURRENT INJURY, INITIAL ENCOUNTER: ICD-10-CM

## 2025-07-07 PROCEDURE — 97110 THERAPEUTIC EXERCISES: CPT | Mod: GP | Performed by: PHYSICAL THERAPIST

## 2025-07-07 PROCEDURE — 97140 MANUAL THERAPY 1/> REGIONS: CPT | Mod: GP | Performed by: PHYSICAL THERAPIST

## 2025-07-07 PROCEDURE — 97112 NEUROMUSCULAR REEDUCATION: CPT | Mod: GP | Performed by: PHYSICAL THERAPIST

## 2025-07-09 ENCOUNTER — PATIENT OUTREACH (OUTPATIENT)
Dept: CARE COORDINATION | Facility: CLINIC | Age: 55
End: 2025-07-09
Payer: COMMERCIAL

## 2025-07-10 DIAGNOSIS — Z12.11 COLON CANCER SCREENING: ICD-10-CM

## 2025-07-21 ENCOUNTER — THERAPY VISIT (OUTPATIENT)
Dept: PHYSICAL THERAPY | Facility: CLINIC | Age: 55
End: 2025-07-21
Payer: COMMERCIAL

## 2025-07-21 DIAGNOSIS — M25.561 ACUTE PAIN OF BOTH KNEES: ICD-10-CM

## 2025-07-21 DIAGNOSIS — M75.81 ROTATOR CUFF TENDINITIS, RIGHT: Primary | ICD-10-CM

## 2025-07-21 DIAGNOSIS — M25.562 ACUTE PAIN OF BOTH KNEES: ICD-10-CM

## 2025-07-21 DIAGNOSIS — M17.11 PRIMARY OSTEOARTHRITIS OF RIGHT KNEE: ICD-10-CM

## 2025-07-21 DIAGNOSIS — M75.41 SUBACROMIAL IMPINGEMENT, RIGHT: ICD-10-CM

## 2025-07-21 DIAGNOSIS — S83.272A COMPLEX TEAR OF LATERAL MENISCUS OF LEFT KNEE AS CURRENT INJURY, INITIAL ENCOUNTER: ICD-10-CM

## 2025-07-21 DIAGNOSIS — M23.52 OLD COMPLETE ACL TEAR, LEFT: ICD-10-CM

## 2025-07-21 DIAGNOSIS — S83.232A COMPLEX TEAR OF MEDIAL MENISCUS OF LEFT KNEE AS CURRENT INJURY, INITIAL ENCOUNTER: ICD-10-CM

## 2025-07-21 DIAGNOSIS — M17.12 PRIMARY OSTEOARTHRITIS OF LEFT KNEE: ICD-10-CM

## 2025-07-21 PROCEDURE — 97112 NEUROMUSCULAR REEDUCATION: CPT | Mod: GP | Performed by: PHYSICAL THERAPIST

## 2025-07-21 PROCEDURE — 97110 THERAPEUTIC EXERCISES: CPT | Mod: GP | Performed by: PHYSICAL THERAPIST

## 2025-07-21 PROCEDURE — 97140 MANUAL THERAPY 1/> REGIONS: CPT | Mod: GP | Performed by: PHYSICAL THERAPIST

## 2025-07-24 ENCOUNTER — ORDERS ONLY (AUTO-RELEASED) (OUTPATIENT)
Dept: URGENT CARE | Facility: CLINIC | Age: 55
End: 2025-07-24
Payer: COMMERCIAL

## 2025-07-24 DIAGNOSIS — Z12.11 COLON CANCER SCREENING: ICD-10-CM

## 2025-08-06 ENCOUNTER — PATIENT OUTREACH (OUTPATIENT)
Dept: CARE COORDINATION | Facility: CLINIC | Age: 55
End: 2025-08-06
Payer: COMMERCIAL

## 2025-08-07 ENCOUNTER — THERAPY VISIT (OUTPATIENT)
Dept: PHYSICAL THERAPY | Facility: CLINIC | Age: 55
End: 2025-08-07
Payer: COMMERCIAL

## 2025-08-07 DIAGNOSIS — S83.272A COMPLEX TEAR OF LATERAL MENISCUS OF LEFT KNEE AS CURRENT INJURY, INITIAL ENCOUNTER: ICD-10-CM

## 2025-08-07 DIAGNOSIS — M25.561 ACUTE PAIN OF BOTH KNEES: ICD-10-CM

## 2025-08-07 DIAGNOSIS — M23.52 OLD COMPLETE ACL TEAR, LEFT: ICD-10-CM

## 2025-08-07 DIAGNOSIS — M75.81 ROTATOR CUFF TENDINITIS, RIGHT: Primary | ICD-10-CM

## 2025-08-07 DIAGNOSIS — M75.41 SUBACROMIAL IMPINGEMENT, RIGHT: ICD-10-CM

## 2025-08-07 DIAGNOSIS — S83.232A COMPLEX TEAR OF MEDIAL MENISCUS OF LEFT KNEE AS CURRENT INJURY, INITIAL ENCOUNTER: ICD-10-CM

## 2025-08-07 DIAGNOSIS — M17.12 PRIMARY OSTEOARTHRITIS OF LEFT KNEE: ICD-10-CM

## 2025-08-07 DIAGNOSIS — M17.11 PRIMARY OSTEOARTHRITIS OF RIGHT KNEE: ICD-10-CM

## 2025-08-07 DIAGNOSIS — M25.562 ACUTE PAIN OF BOTH KNEES: ICD-10-CM

## 2025-08-11 LAB — HEMOCCULT STL QL IA: NEGATIVE

## 2025-08-15 DIAGNOSIS — J45.40 MODERATE PERSISTENT ASTHMA WITHOUT COMPLICATION: ICD-10-CM

## 2025-08-15 DIAGNOSIS — E66.01 MORBID OBESITY (H): ICD-10-CM

## 2025-08-18 ENCOUNTER — PATIENT OUTREACH (OUTPATIENT)
Dept: CARE COORDINATION | Facility: CLINIC | Age: 55
End: 2025-08-18
Payer: COMMERCIAL

## 2025-08-19 RX ORDER — FLUTICASONE PROPIONATE AND SALMETEROL 500; 50 UG/1; UG/1
1 POWDER RESPIRATORY (INHALATION) EVERY 12 HOURS
Qty: 3 EACH | Refills: 0 | Status: SHIPPED | OUTPATIENT
Start: 2025-08-19

## 2025-08-19 RX ORDER — TIRZEPATIDE 2.5 MG/.5ML
INJECTION, SOLUTION SUBCUTANEOUS
Qty: 2 ML | Refills: 0 | OUTPATIENT
Start: 2025-08-19

## 2025-08-21 ENCOUNTER — THERAPY VISIT (OUTPATIENT)
Dept: PHYSICAL THERAPY | Facility: CLINIC | Age: 55
End: 2025-08-21
Payer: COMMERCIAL

## 2025-08-21 DIAGNOSIS — M23.52 OLD COMPLETE ACL TEAR, LEFT: ICD-10-CM

## 2025-08-21 DIAGNOSIS — M25.561 ACUTE PAIN OF BOTH KNEES: ICD-10-CM

## 2025-08-21 DIAGNOSIS — M75.41 SUBACROMIAL IMPINGEMENT, RIGHT: ICD-10-CM

## 2025-08-21 DIAGNOSIS — S83.272A COMPLEX TEAR OF LATERAL MENISCUS OF LEFT KNEE AS CURRENT INJURY, INITIAL ENCOUNTER: ICD-10-CM

## 2025-08-21 DIAGNOSIS — M17.11 PRIMARY OSTEOARTHRITIS OF RIGHT KNEE: ICD-10-CM

## 2025-08-21 DIAGNOSIS — M25.562 ACUTE PAIN OF BOTH KNEES: ICD-10-CM

## 2025-08-21 DIAGNOSIS — S83.232A COMPLEX TEAR OF MEDIAL MENISCUS OF LEFT KNEE AS CURRENT INJURY, INITIAL ENCOUNTER: ICD-10-CM

## 2025-08-21 DIAGNOSIS — M17.12 PRIMARY OSTEOARTHRITIS OF LEFT KNEE: ICD-10-CM

## 2025-08-21 DIAGNOSIS — M75.81 ROTATOR CUFF TENDINITIS, RIGHT: Primary | ICD-10-CM

## 2025-09-03 ENCOUNTER — PATIENT OUTREACH (OUTPATIENT)
Dept: CARE COORDINATION | Facility: CLINIC | Age: 55
End: 2025-09-03
Payer: COMMERCIAL

## (undated) DEVICE — ESU GROUND PAD ADULT W/CORD E7507

## (undated) DEVICE — SU PROLENE 4-0 PS-2 18" 8682G

## (undated) DEVICE — PACK MAJOR SBA15MAFSI

## (undated) DEVICE — GLOVE PROTEXIS POWDER FREE 6.5 ORTHOPEDIC 2D73ET65

## (undated) DEVICE — BAG CLEAR TRASH 1.3M 39X33" P4040C

## (undated) DEVICE — SOL WATER IRRIG 1000ML BOTTLE 2F7114

## (undated) DEVICE — LINEN TOWEL PACK X5 5464

## (undated) DEVICE — ESU PENCIL W/SMOKE EVAC NEPTUNE STRYKER 0703-046-000

## (undated) DEVICE — LINEN FULL SHEET 5511

## (undated) DEVICE — BNDG ELASTIC 4" DBL LENGTH UNSTERILE 6611-14

## (undated) DEVICE — GOWN XLG DISP 9545

## (undated) DEVICE — PAD CHUX UNDERPAD 23X24" 7136

## (undated) DEVICE — STPL SKIN 35W APPOSE 8886803712

## (undated) DEVICE — LINEN ORTHO ACL PACK 5447

## (undated) DEVICE — SU MONOCRYL 4-0 PS-2 18" UND Y496G

## (undated) DEVICE — DRSG KERLIX 4 1/2"X4YDS ROLL 6715

## (undated) DEVICE — DRSG XEROFORM 5X9" 8884431605

## (undated) DEVICE — SU VICRYL 4-0 PS-2 18" UND J496H

## (undated) DEVICE — SU MONOCRYL 3-0 PS-2 27" Y427H

## (undated) DEVICE — SU MONOCRYL 5-0 P-3 18" UND Y493G

## (undated) DEVICE — LINEN HALF SHEET 5512

## (undated) DEVICE — TOURNIQUET SGL BLADDER 34"X4" PURPLE 5921-034-135

## (undated) DEVICE — DRSG ADAPTIC 3X3" 6112

## (undated) DEVICE — GLOVE BIOGEL PI MICRO INDICATOR UNDERGLOVE SZ 6.5 48965

## (undated) DEVICE — BLADE KNIFE SURG 15 371115

## (undated) DEVICE — DRAPE BREAST/CHEST 29420

## (undated) DEVICE — PREP SKIN SCRUB TRAY 4461A

## (undated) DEVICE — SOL NACL 0.9% IRRIG 1000ML BOTTLE 07138-09

## (undated) DEVICE — SU PDS II 0 CT 36" Z358T

## (undated) DEVICE — BLADE KNIFE SURG 10 371110

## (undated) DEVICE — GLOVE PROTEXIS BLUE W/NEU-THERA 6.5  2D73EB65

## (undated) DEVICE — DRSG KERLIX 4 1/2"X4YDS ROLL 6730

## (undated) DEVICE — DRSG GAUZE 4X4" TRAY

## (undated) DEVICE — SPONGE LAP 18X18" X8435

## (undated) DEVICE — GLOVE BIOGEL PI MICRO SZ 6.5 48565

## (undated) DEVICE — Device

## (undated) DEVICE — PAD CHUX UNDERPAD 30X36" P3036C

## (undated) DEVICE — MANIFOLD NEPTUNE 4 PORT 700-20

## (undated) DEVICE — SUCTION CANISTER MEDIVAC LINER 3000ML W/LID 65651-530

## (undated) DEVICE — PACK LOWER EXTREMITY RIDGES

## (undated) DEVICE — SOL NACL 0.9% IRRIG 1000ML BOTTLE 2F7124

## (undated) DEVICE — PEN MARKING SKIN W/LABELS 31145884

## (undated) RX ORDER — ONDANSETRON 2 MG/ML
INJECTION INTRAMUSCULAR; INTRAVENOUS
Status: DISPENSED
Start: 2018-06-06

## (undated) RX ORDER — ONDANSETRON 2 MG/ML
INJECTION INTRAMUSCULAR; INTRAVENOUS
Status: DISPENSED
Start: 2024-08-05

## (undated) RX ORDER — CEFAZOLIN SODIUM 2 G/100ML
INJECTION, SOLUTION INTRAVENOUS
Status: DISPENSED
Start: 2018-06-06

## (undated) RX ORDER — OXYCODONE HYDROCHLORIDE 5 MG/1
TABLET ORAL
Status: DISPENSED
Start: 2024-08-05

## (undated) RX ORDER — PROPOFOL 10 MG/ML
INJECTION, EMULSION INTRAVENOUS
Status: DISPENSED
Start: 2018-06-06

## (undated) RX ORDER — FENTANYL CITRATE 50 UG/ML
INJECTION, SOLUTION INTRAMUSCULAR; INTRAVENOUS
Status: DISPENSED
Start: 2024-08-05

## (undated) RX ORDER — CEFAZOLIN SODIUM/WATER 2 G/20 ML
SYRINGE (ML) INTRAVENOUS
Status: DISPENSED
Start: 2024-08-05

## (undated) RX ORDER — LIDOCAINE HYDROCHLORIDE 20 MG/ML
INJECTION, SOLUTION EPIDURAL; INFILTRATION; INTRACAUDAL; PERINEURAL
Status: DISPENSED
Start: 2018-06-06

## (undated) RX ORDER — HYDROMORPHONE HYDROCHLORIDE 1 MG/ML
INJECTION, SOLUTION INTRAMUSCULAR; INTRAVENOUS; SUBCUTANEOUS
Status: DISPENSED
Start: 2018-06-06

## (undated) RX ORDER — FENTANYL CITRATE 50 UG/ML
INJECTION, SOLUTION INTRAMUSCULAR; INTRAVENOUS
Status: DISPENSED
Start: 2018-06-06

## (undated) RX ORDER — HYDROXYZINE HYDROCHLORIDE 50 MG/1
TABLET, FILM COATED ORAL
Status: DISPENSED
Start: 2018-06-06

## (undated) RX ORDER — DEXAMETHASONE SODIUM PHOSPHATE 4 MG/ML
INJECTION, SOLUTION INTRA-ARTICULAR; INTRALESIONAL; INTRAMUSCULAR; INTRAVENOUS; SOFT TISSUE
Status: DISPENSED
Start: 2018-06-06

## (undated) RX ORDER — PROPOFOL 10 MG/ML
INJECTION, EMULSION INTRAVENOUS
Status: DISPENSED
Start: 2024-08-05

## (undated) RX ORDER — HYDROMORPHONE HYDROCHLORIDE 1 MG/ML
INJECTION, SOLUTION INTRAMUSCULAR; INTRAVENOUS; SUBCUTANEOUS
Status: DISPENSED
Start: 2024-08-05

## (undated) RX ORDER — CEFAZOLIN SODIUM 1 G/3ML
INJECTION, POWDER, FOR SOLUTION INTRAMUSCULAR; INTRAVENOUS
Status: DISPENSED
Start: 2018-06-06

## (undated) RX ORDER — HYDROCODONE BITARTRATE AND ACETAMINOPHEN 5; 325 MG/1; MG/1
TABLET ORAL
Status: DISPENSED
Start: 2018-06-06

## (undated) RX ORDER — LIDOCAINE HYDROCHLORIDE 10 MG/ML
INJECTION, SOLUTION EPIDURAL; INFILTRATION; INTRACAUDAL; PERINEURAL
Status: DISPENSED
Start: 2024-08-05

## (undated) RX ORDER — FENTANYL CITRATE-0.9 % NACL/PF 10 MCG/ML
PLASTIC BAG, INJECTION (ML) INTRAVENOUS
Status: DISPENSED
Start: 2024-08-05

## (undated) RX ORDER — ACETAMINOPHEN 500 MG
TABLET ORAL
Status: DISPENSED
Start: 2018-06-06

## (undated) RX ORDER — BUPIVACAINE HYDROCHLORIDE 5 MG/ML
INJECTION, SOLUTION EPIDURAL; INTRACAUDAL
Status: DISPENSED
Start: 2024-08-05